# Patient Record
Sex: FEMALE | Race: BLACK OR AFRICAN AMERICAN | NOT HISPANIC OR LATINO | Employment: UNEMPLOYED | URBAN - METROPOLITAN AREA
[De-identification: names, ages, dates, MRNs, and addresses within clinical notes are randomized per-mention and may not be internally consistent; named-entity substitution may affect disease eponyms.]

---

## 2017-02-17 ENCOUNTER — CONVERSION ENCOUNTER (OUTPATIENT)
Dept: OTHER | Facility: HOSPITAL | Age: 57
End: 2017-02-17

## 2018-01-02 ENCOUNTER — OFFICE VISIT CONVERTED (OUTPATIENT)
Dept: FAMILY MEDICINE CLINIC | Age: 58
End: 2018-01-02
Attending: NURSE PRACTITIONER

## 2018-03-29 ENCOUNTER — OFFICE VISIT CONVERTED (OUTPATIENT)
Dept: FAMILY MEDICINE CLINIC | Age: 58
End: 2018-03-29
Attending: NURSE PRACTITIONER

## 2018-05-10 ENCOUNTER — OFFICE VISIT CONVERTED (OUTPATIENT)
Dept: FAMILY MEDICINE CLINIC | Age: 58
End: 2018-05-10
Attending: NURSE PRACTITIONER

## 2018-05-29 ENCOUNTER — OFFICE VISIT CONVERTED (OUTPATIENT)
Dept: CARDIOLOGY | Facility: CLINIC | Age: 58
End: 2018-05-29
Attending: INTERNAL MEDICINE

## 2018-05-29 ENCOUNTER — CONVERSION ENCOUNTER (OUTPATIENT)
Dept: OTHER | Facility: HOSPITAL | Age: 58
End: 2018-05-29

## 2018-06-04 ENCOUNTER — CONVERSION ENCOUNTER (OUTPATIENT)
Dept: CARDIOLOGY | Facility: CLINIC | Age: 58
End: 2018-06-04
Attending: INTERNAL MEDICINE

## 2018-08-21 ENCOUNTER — OFFICE VISIT CONVERTED (OUTPATIENT)
Dept: CARDIOLOGY | Facility: CLINIC | Age: 58
End: 2018-08-21
Attending: INTERNAL MEDICINE

## 2018-11-09 ENCOUNTER — OFFICE VISIT CONVERTED (OUTPATIENT)
Dept: FAMILY MEDICINE CLINIC | Age: 58
End: 2018-11-09
Attending: FAMILY MEDICINE

## 2018-11-15 ENCOUNTER — CONVERSION ENCOUNTER (OUTPATIENT)
Dept: OTHER | Facility: HOSPITAL | Age: 58
End: 2018-11-15

## 2019-03-01 ENCOUNTER — OFFICE VISIT CONVERTED (OUTPATIENT)
Dept: CARDIOLOGY | Facility: CLINIC | Age: 59
End: 2019-03-01
Attending: INTERNAL MEDICINE

## 2019-03-01 ENCOUNTER — CONVERSION ENCOUNTER (OUTPATIENT)
Dept: CARDIOLOGY | Facility: CLINIC | Age: 59
End: 2019-03-01

## 2019-07-31 ENCOUNTER — OFFICE VISIT CONVERTED (OUTPATIENT)
Dept: CARDIOLOGY | Facility: CLINIC | Age: 59
End: 2019-07-31
Attending: INTERNAL MEDICINE

## 2019-08-16 ENCOUNTER — HOSPITAL ENCOUNTER (OUTPATIENT)
Dept: OTHER | Facility: HOSPITAL | Age: 59
Discharge: HOME | End: 2019-08-16
Attending: FAMILY MEDICINE

## 2019-08-16 ENCOUNTER — OFFICE VISIT CONVERTED (OUTPATIENT)
Dept: FAMILY MEDICINE CLINIC | Age: 59
End: 2019-08-16
Attending: FAMILY MEDICINE

## 2019-08-16 LAB
APPEARANCE UR: CLEAR
BACTERIA UR QL AUTO: ABNORMAL
BILIRUB UR QL: NEGATIVE
CASTS URNS QL MICRO: ABNORMAL /[LPF]
COLOR UR: YELLOW
CONV LEUKOCYTE ESTERASE: NEGATIVE
CONV UROBILINOGEN IN URINE BY AUTOMATED TEST STRIP: 0.2 {EHRLICHU}/DL (ref 0.1–1)
EPI CELLS #/AREA URNS HPF: ABNORMAL /[HPF]
GLUCOSE 24H UR-MCNC: NEGATIVE MG/DL
HGB UR QL STRIP: NEGATIVE
KETONES UR QL STRIP: NEGATIVE MG/DL
MUCOUS THREADS URNS QL MICRO: ABNORMAL
NITRITE UR-MCNC: NEGATIVE MG/ML
PH UR STRIP.AUTO: 5.5 [PH] (ref 5–8)
PROT UR-MCNC: NEGATIVE MG/DL
RBC # BLD AUTO: ABNORMAL /[HPF]
SP GR UR STRIP: >=1.03 (ref 1–1.03)
SPECIMEN SOURCE: ABNORMAL
UNIDENT CRYS URNS QL MICRO: ABNORMAL /[HPF]
WBC #/AREA URNS HPF: ABNORMAL /[HPF]

## 2019-08-22 LAB — CONV THC CONFIRMATION (URINE) BY GC/MS: 142 NG/ML

## 2019-08-23 ENCOUNTER — HOSPITAL ENCOUNTER (OUTPATIENT)
Dept: OTHER | Facility: HOSPITAL | Age: 59
Discharge: HOME | End: 2019-08-23
Attending: FAMILY MEDICINE

## 2019-08-28 ENCOUNTER — OFFICE VISIT CONVERTED (OUTPATIENT)
Dept: NEUROSURGERY | Facility: CLINIC | Age: 59
End: 2019-08-28
Attending: NEUROLOGICAL SURGERY

## 2019-09-03 ENCOUNTER — OFFICE VISIT CONVERTED (OUTPATIENT)
Dept: CARDIOLOGY | Facility: CLINIC | Age: 59
End: 2019-09-03
Attending: INTERNAL MEDICINE

## 2019-10-22 ENCOUNTER — HOSPITAL ENCOUNTER (OUTPATIENT)
Dept: OTHER | Facility: HOSPITAL | Age: 59
Discharge: HOME | End: 2019-10-22
Attending: FAMILY MEDICINE

## 2019-10-22 ENCOUNTER — OFFICE VISIT CONVERTED (OUTPATIENT)
Dept: FAMILY MEDICINE CLINIC | Age: 59
End: 2019-10-22
Attending: FAMILY MEDICINE

## 2019-12-31 ENCOUNTER — HOSPITAL ENCOUNTER (OUTPATIENT)
Dept: OTHER | Facility: HOSPITAL | Age: 59
Discharge: HOME | End: 2019-12-31
Attending: FAMILY MEDICINE

## 2020-03-11 ENCOUNTER — OFFICE VISIT CONVERTED (OUTPATIENT)
Dept: CARDIOLOGY | Facility: CLINIC | Age: 60
End: 2020-03-11
Attending: INTERNAL MEDICINE

## 2020-03-23 ENCOUNTER — OFFICE VISIT CONVERTED (OUTPATIENT)
Dept: FAMILY MEDICINE CLINIC | Age: 60
End: 2020-03-23
Attending: FAMILY MEDICINE

## 2020-03-24 ENCOUNTER — HOSPITAL ENCOUNTER (OUTPATIENT)
Dept: OTHER | Facility: HOSPITAL | Age: 60
Discharge: HOME | End: 2020-03-24
Attending: FAMILY MEDICINE

## 2020-03-24 LAB
ALBUMIN SERPL-MCNC: 4.4 G/DL (ref 3.5–5)
ALBUMIN/GLOB SERPL: 1.6 {RATIO} (ref 1.4–2.6)
ALP SERPL-CCNC: 65 U/L (ref 53–141)
ALT SERPL-CCNC: 18 U/L (ref 10–40)
ANION GAP SERPL CALC-SCNC: 19 MMOL/L (ref 8–19)
APTT BLD: 25.7 S (ref 22.2–34.2)
AST SERPL-CCNC: 26 U/L (ref 15–50)
BILIRUB SERPL-MCNC: 0.45 MG/DL (ref 0.2–1.3)
BUN SERPL-MCNC: 16 MG/DL (ref 5–25)
BUN/CREAT SERPL: 18 {RATIO} (ref 6–20)
CALCIUM SERPL-MCNC: 9.5 MG/DL (ref 8.7–10.4)
CHLORIDE SERPL-SCNC: 100 MMOL/L (ref 99–111)
CHOLEST SERPL-MCNC: 167 MG/DL (ref 107–200)
CHOLEST/HDLC SERPL: 3 {RATIO} (ref 3–6)
CK SERPL-CCNC: 182 U/L (ref 35–230)
CONV CO2: 22 MMOL/L (ref 22–32)
CONV TOTAL PROTEIN: 7.1 G/DL (ref 6.3–8.2)
CREAT UR-MCNC: 0.88 MG/DL (ref 0.5–0.9)
ERYTHROCYTE [DISTWIDTH] IN BLOOD BY AUTOMATED COUNT: 13.3 % (ref 11.5–14.5)
GFR SERPLBLD BASED ON 1.73 SQ M-ARVRAT: >60 ML/MIN/{1.73_M2}
GLOBULIN UR ELPH-MCNC: 2.7 G/DL (ref 2–3.5)
GLUCOSE SERPL-MCNC: 114 MG/DL (ref 65–99)
HBA1C MFR BLD: 12 G/DL (ref 12–16)
HCT VFR BLD AUTO: 36.7 % (ref 37–47)
HDLC SERPL-MCNC: 55 MG/DL (ref 40–60)
INR PPP: 0.94 (ref 2–3)
LDLC SERPL CALC-MCNC: 94 MG/DL (ref 70–100)
MCH RBC QN AUTO: 28 PG (ref 27–31)
MCHC RBC AUTO-ENTMCNC: 32.7 G/DL (ref 33–37)
MCV RBC AUTO: 85.5 FL (ref 81–99)
OSMOLALITY SERPL CALC.SUM OF ELEC: 286 MOSM/KG (ref 273–304)
PLATELET # BLD AUTO: 310 10*3/UL (ref 130–400)
PMV BLD AUTO: 9.6 FL (ref 7.4–10.4)
POTASSIUM SERPL-SCNC: 3.9 MMOL/L (ref 3.5–5.3)
PROTHROMBIN TIME: 10.3 S (ref 9.4–12)
RBC # BLD AUTO: 4.29 10*6/UL (ref 4.2–5.4)
SODIUM SERPL-SCNC: 137 MMOL/L (ref 135–147)
TRIGL SERPL-MCNC: 89 MG/DL (ref 40–150)
TSH SERPL-ACNC: 3.18 M[IU]/L (ref 0.27–4.2)
VLDLC SERPL-MCNC: 18 MG/DL (ref 5–37)
WBC # BLD AUTO: 4.62 10*3/UL (ref 4.8–10.8)

## 2020-08-05 ENCOUNTER — OFFICE VISIT CONVERTED (OUTPATIENT)
Dept: FAMILY MEDICINE CLINIC | Age: 60
End: 2020-08-05
Attending: NURSE PRACTITIONER

## 2020-08-07 ENCOUNTER — HOSPITAL ENCOUNTER (OUTPATIENT)
Dept: OTHER | Facility: HOSPITAL | Age: 60
Discharge: HOME | End: 2020-08-07
Attending: NURSE PRACTITIONER

## 2020-10-28 ENCOUNTER — OFFICE VISIT CONVERTED (OUTPATIENT)
Dept: NEUROSURGERY | Facility: CLINIC | Age: 60
End: 2020-10-28
Attending: PHYSICIAN ASSISTANT

## 2020-11-24 ENCOUNTER — CONVERSION ENCOUNTER (OUTPATIENT)
Dept: CARDIOLOGY | Facility: CLINIC | Age: 60
End: 2020-11-24

## 2020-11-24 ENCOUNTER — OFFICE VISIT CONVERTED (OUTPATIENT)
Dept: CARDIOLOGY | Facility: CLINIC | Age: 60
End: 2020-11-24
Attending: INTERNAL MEDICINE

## 2020-12-22 ENCOUNTER — OFFICE VISIT CONVERTED (OUTPATIENT)
Dept: FAMILY MEDICINE CLINIC | Age: 60
End: 2020-12-22
Attending: FAMILY MEDICINE

## 2020-12-28 ENCOUNTER — HOSPITAL ENCOUNTER (OUTPATIENT)
Dept: OTHER | Facility: HOSPITAL | Age: 60
Discharge: HOME | End: 2020-12-28
Attending: FAMILY MEDICINE

## 2020-12-28 LAB
ALBUMIN SERPL-MCNC: 4.4 G/DL (ref 3.5–5)
ALBUMIN/GLOB SERPL: 1.7 {RATIO} (ref 1.4–2.6)
ALP SERPL-CCNC: 80 U/L (ref 53–141)
ALT SERPL-CCNC: 20 U/L (ref 10–40)
ANION GAP SERPL CALC-SCNC: 12 MMOL/L (ref 8–19)
AST SERPL-CCNC: 20 U/L (ref 15–50)
BILIRUB SERPL-MCNC: 0.2 MG/DL (ref 0.2–1.3)
BUN SERPL-MCNC: 17 MG/DL (ref 5–25)
BUN/CREAT SERPL: 21 {RATIO} (ref 6–20)
CALCIUM SERPL-MCNC: 9.7 MG/DL (ref 8.7–10.4)
CHLORIDE SERPL-SCNC: 108 MMOL/L (ref 99–111)
CK SERPL-CCNC: 94 U/L (ref 35–230)
CONV CO2: 26 MMOL/L (ref 22–32)
CONV TOTAL PROTEIN: 7 G/DL (ref 6.3–8.2)
CREAT UR-MCNC: 0.8 MG/DL (ref 0.5–0.9)
GFR SERPLBLD BASED ON 1.73 SQ M-ARVRAT: >60 ML/MIN/{1.73_M2}
GLOBULIN UR ELPH-MCNC: 2.6 G/DL (ref 2–3.5)
GLUCOSE SERPL-MCNC: 98 MG/DL (ref 65–99)
OSMOLALITY SERPL CALC.SUM OF ELEC: 296 MOSM/KG (ref 273–304)
POTASSIUM SERPL-SCNC: 4.2 MMOL/L (ref 3.5–5.3)
SODIUM SERPL-SCNC: 142 MMOL/L (ref 135–147)

## 2021-02-08 ENCOUNTER — HOSPITAL ENCOUNTER (OUTPATIENT)
Dept: OTHER | Facility: HOSPITAL | Age: 61
Discharge: HOME | End: 2021-02-08
Attending: NURSE PRACTITIONER

## 2021-02-10 ENCOUNTER — HOSPITAL ENCOUNTER (OUTPATIENT)
Dept: OTHER | Facility: HOSPITAL | Age: 61
Discharge: HOME | End: 2021-02-10
Attending: FAMILY MEDICINE

## 2021-02-10 LAB
APPEARANCE UR: CLEAR
BACTERIA UR CULT: NORMAL
BACTERIA UR QL AUTO: ABNORMAL
BILIRUB UR QL: NEGATIVE
CASTS URNS QL MICRO: ABNORMAL /[LPF]
COLOR UR: YELLOW
CONV LEUKOCYTE ESTERASE: ABNORMAL
CONV UROBILINOGEN IN URINE BY AUTOMATED TEST STRIP: 0.2 {EHRLICHU}/DL (ref 0.1–1)
EPI CELLS #/AREA URNS HPF: ABNORMAL /[HPF]
GLUCOSE 24H UR-MCNC: NEGATIVE MG/DL
HGB UR QL STRIP: NEGATIVE
KETONES UR QL STRIP: NEGATIVE MG/DL
MUCOUS THREADS URNS QL MICRO: ABNORMAL
NITRITE UR-MCNC: NEGATIVE MG/ML
PH UR STRIP.AUTO: 7 [PH] (ref 5–8)
PROT UR-MCNC: NEGATIVE MG/DL
RBC # BLD AUTO: ABNORMAL /[HPF]
SP GR UR STRIP: 1.01 (ref 1–1.03)
SPECIMEN SOURCE: ABNORMAL
UNIDENT CRYS URNS QL MICRO: ABNORMAL /[HPF]
WBC #/AREA URNS HPF: ABNORMAL /[HPF]

## 2021-02-12 LAB — BACTERIA UR CULT: NORMAL

## 2021-03-12 ENCOUNTER — HOSPITAL ENCOUNTER (OUTPATIENT)
Dept: VACCINE CLINIC | Facility: HOSPITAL | Age: 61
Discharge: HOME | End: 2021-03-12
Attending: INTERNAL MEDICINE

## 2021-03-22 ENCOUNTER — HOSPITAL ENCOUNTER (OUTPATIENT)
Dept: OTHER | Facility: HOSPITAL | Age: 61
Discharge: HOME | End: 2021-03-22
Attending: FAMILY MEDICINE

## 2021-03-22 ENCOUNTER — OFFICE VISIT CONVERTED (OUTPATIENT)
Dept: FAMILY MEDICINE CLINIC | Age: 61
End: 2021-03-22
Attending: FAMILY MEDICINE

## 2021-03-22 LAB
APPEARANCE UR: CLEAR
BACTERIA UR QL AUTO: ABNORMAL
BILIRUB UR QL: NEGATIVE
CASTS URNS QL MICRO: ABNORMAL /[LPF]
CK SERPL-CCNC: 144 U/L (ref 35–230)
COLOR UR: YELLOW
CONV LEUKOCYTE ESTERASE: NEGATIVE
CONV UROBILINOGEN IN URINE BY AUTOMATED TEST STRIP: 0.2 {EHRLICHU}/DL (ref 0.1–1)
EPI CELLS #/AREA URNS HPF: ABNORMAL /[HPF]
ERYTHROCYTE [DISTWIDTH] IN BLOOD BY AUTOMATED COUNT: 12.9 % (ref 11.5–14.5)
GLUCOSE 24H UR-MCNC: NEGATIVE MG/DL
HBA1C MFR BLD: 13.4 G/DL (ref 12–16)
HCT VFR BLD AUTO: 40.5 % (ref 37–47)
HGB UR QL STRIP: ABNORMAL
KETONES UR QL STRIP: 15 MG/DL
MCH RBC QN AUTO: 28.6 PG (ref 27–31)
MCHC RBC AUTO-ENTMCNC: 33.1 G/DL (ref 33–37)
MCV RBC AUTO: 86.4 FL (ref 81–99)
MUCOUS THREADS URNS QL MICRO: ABNORMAL
NITRITE UR-MCNC: NEGATIVE MG/ML
PH UR STRIP.AUTO: 5.5 [PH] (ref 5–8)
PLATELET # BLD AUTO: 336 10*3/UL (ref 130–400)
PMV BLD AUTO: 10.2 FL (ref 7.4–10.4)
PROT UR-MCNC: NEGATIVE MG/DL
RBC # BLD AUTO: 4.69 10*6/UL (ref 4.2–5.4)
RBC # BLD AUTO: ABNORMAL /[HPF]
SP GR UR STRIP: >=1.03 (ref 1–1.03)
SPECIMEN SOURCE: ABNORMAL
TSH SERPL-ACNC: 2.07 M[IU]/L (ref 0.27–4.2)
UNIDENT CRYS URNS QL MICRO: ABNORMAL /[HPF]
WBC # BLD AUTO: 6.05 10*3/UL (ref 4.8–10.8)
WBC #/AREA URNS HPF: ABNORMAL /[HPF]

## 2021-03-23 LAB
ALBUMIN SERPL-MCNC: 4.9 G/DL (ref 3.5–5)
ALBUMIN/GLOB SERPL: 1.8 {RATIO} (ref 1.4–2.6)
ALP SERPL-CCNC: 65 U/L (ref 53–141)
ALT SERPL-CCNC: 20 U/L (ref 10–40)
ANION GAP SERPL CALC-SCNC: 21 MMOL/L (ref 8–19)
AST SERPL-CCNC: 23 U/L (ref 15–50)
BILIRUB SERPL-MCNC: 0.46 MG/DL (ref 0.2–1.3)
BUN SERPL-MCNC: 21 MG/DL (ref 5–25)
BUN/CREAT SERPL: 23 {RATIO} (ref 6–20)
CALCIUM SERPL-MCNC: 9.6 MG/DL (ref 8.7–10.4)
CHLORIDE SERPL-SCNC: 97 MMOL/L (ref 99–111)
CHOLEST SERPL-MCNC: 175 MG/DL (ref 107–200)
CHOLEST/HDLC SERPL: 3.2 {RATIO} (ref 3–6)
CONV CO2: 23 MMOL/L (ref 22–32)
CONV TOTAL PROTEIN: 7.7 G/DL (ref 6.3–8.2)
CREAT UR-MCNC: 0.91 MG/DL (ref 0.5–0.9)
GFR SERPLBLD BASED ON 1.73 SQ M-ARVRAT: >60 ML/MIN/{1.73_M2}
GLOBULIN UR ELPH-MCNC: 2.8 G/DL (ref 2–3.5)
GLUCOSE SERPL-MCNC: 91 MG/DL (ref 65–99)
HDLC SERPL-MCNC: 54 MG/DL (ref 40–60)
LDLC SERPL CALC-MCNC: 105 MG/DL (ref 70–100)
OSMOLALITY SERPL CALC.SUM OF ELEC: 287 MOSM/KG (ref 273–304)
POTASSIUM SERPL-SCNC: 3.6 MMOL/L (ref 3.5–5.3)
SODIUM SERPL-SCNC: 137 MMOL/L (ref 135–147)
TRIGL SERPL-MCNC: 82 MG/DL (ref 40–150)
VLDLC SERPL-MCNC: 16 MG/DL (ref 5–37)

## 2021-03-24 LAB — BACTERIA UR CULT: NORMAL

## 2021-04-09 ENCOUNTER — HOSPITAL ENCOUNTER (OUTPATIENT)
Dept: VACCINE CLINIC | Facility: HOSPITAL | Age: 61
Discharge: HOME | End: 2021-04-09
Attending: INTERNAL MEDICINE

## 2021-05-13 NOTE — PROGRESS NOTES
Progress Note      Patient Name: Chely Calderón   Patient ID: [id number]   Sex: Female   YOB: 1960    Primary Care Provider: Manoj Ybarra MD   Referring Provider: Manoj Ybarra MD    Visit Date: 2020    Provider: Jesse Wilson MD   Location: [facility]   Location Address: [address] [place] [address] Adriel Gold [facility]  [address]  [place], [place]  [id number]   Location Phone: [phone]          Chief Complaint  · Follow-up visit for chest pain and hypertension       History Of Present Illness  REFERRING CARE PROVIDER: Manoj Ybarra MD   Chely Calderón is a 60-year-old female with hypertension, hyperlipidemia, fibromyalgia, who is here for a follow-up visit. Last seen in the office on May 21, 2020. The patient is very stressed out today and appears to be depressed. She is telling me that her son  in the meantime. She is stressed out most of the time. Blood pressure has been running better after adding Amlodipine in September. Occasionally she gets chest pain but mild and not related to activities. She also has palpitations. No shortness of breath. No dizziness. Patient was very tearful in the office during my interview.   PAST MEDICAL HISTORY: (1) Fibromyalgia. (2) Hypertension. (3) Hyperlipidemia. (4) Negative for coronary artery disease or diabetes mellitus.   PSYCHOSOCIAL HISTORY: She drinks a moderate amount of alcohol. She previously used tobacco but quit.   CURRENT MEDICATIONS: include Atorvastatin 20 mg daily; Carvedilol 6.25 mg b.i.d.; HCTZ 12.5 mg daily; Losartan 100 mg daily; Omeprazole 40 mg daily; Trazodone 50 mg p.r.n.; ASA 81 mg daily; probiotic; multivitamin; Amlodipine 5 mg daily. The dosage and frequency of the medications were reviewed with the patient.      ALLERGIES: Lisinopril.       Review of Systems  · Cardiovascular  o Admits  o : palpitations (fast, fluttering, or skipping beats), chest pain or angina pectoris   o Denies  o : swelling (feet, ankles,  "hands), shortness of breath while walking or lying flat  · Respiratory  o Denies  o : chronic or frequent cough, asthma or wheezing      Vitals  Date Time BP Position Site L\R Cuff Size HR RR TEMP (F) WT  HT  BMI kg/m2 BSA m2 O2 Sat FR L/min FiO2 HC       11/24/2020 04:05 /72 Sitting    72 - R   192lbs 5oz 5'  4\" 33.01 1.98             Physical Examination  · Respiratory  o Auscultation of Lungs  o : Clear to auscultation bilaterally. No crackles or rhonchi.  · Cardiovascular  o Heart  o : S1, S2 is normally heard. No S3. No murmur, rubs, or gallops.  · Gastrointestinal  o Abdominal Examination  o : Soft, nontender, nondistended. No free fluid. Bowel sounds heard in all four quadrants.  · Extremities  o Extremities  o : Warm and well perfused. No pitting pedal edema. Distal pulses present.     No recent labs available for review.           Assessment     ASSESSMENT AND PLAN:    1.  Hypertension:  Blood pressure reasonably well controlled per office readings and also per readings at home.      Continue the current regimen, including Amlodipine, Losartan and Carvedilol.  2.  Hyperlipidemia:  Will continue Atorvastatin.  Will get the recent labs from the PCP's office.  3.  Chest pain:  Atypical symptoms.  Previous two stress tests were negative.  No further workup is planned at       this point.  4.  Follow up in one year or earlier if needed.    MD IFTIKHAR Blanco/kerry           This note was transcribed by Marisol Gonzalez.  kerry/IFTIKHAR  The above service was transcribed by Marisol Gonzalez, and I attest to the accuracy of the note.  IFTIKHAR               Electronically Signed by: Marisol Gonzalez-, -Author on December 1, 2020 06:47:10 AM  Electronically Co-signed by: Jesse Wilson MD -Reviewer on December 1, 2020 05:21:56 PM  "

## 2021-05-13 NOTE — PROGRESS NOTES
Progress Note      Patient Name: Chely Calderón   Patient ID: [id number]   Sex: Female   YOB: 1960    Primary Care Provider: Manoj Ybarra MD   Referring Provider: Manoj Ybarra MD    Visit Date: October 28, 2020    Provider: Ruth Messer PA-C   Location: [facility]   Location Address: [address] [place] [address]  [id number]   Location Phone: [phone]          Chief Complaint     Patient is being seen today for low back and bilateral leg pain. Patient also states she's having numbness/tingling in LLE and RLE.       History Of Present Illness  The patient is a 60 year old /Black female who is in the office for followup appointment.      Here for f/u for lbp and bilateral leg pain, right greater than left.  Pain worse with standing and walking and improves with sitting.  Failed PT, which is what Dr. Swartz recommended.  MRI lumbar spine was in August 2019 and showed a central disc herniation at L5/S1 causing moderate central canal stenosis.  Pain in the right leg is down to the foot. She would like to consider LESB.       Past Medical History  Hyperlipidemia; Hypertension, Benign Essential; Leg pain; Muscle cramps         Past Surgical History  Cholecystecomy         Medication List  amlodipine 5 mg oral tablet; Aspir-81 81 mg oral tablet,delayed release (DR/EC); atorvastatin 20 mg oral tablet; carvedilol 3.125 mg oral tablet; carvedilol 6.25 mg oral tablet; gabapentin 100 mg oral capsule; hydrochlorothiazide 12.5 mg oral capsule; losartan 100 mg oral tablet; multivitamin oral capsule; omeprazole 40 mg oral capsule,delayed release(DR/EC); trazodone 50 mg oral tablet         Allergy List  lisinopril       Allergies Reconciled  Family Medical History  Family history of Arthritis; Family history of heart disease; Family history of diabetes mellitus         Social History  Alcohol (Current some day); ; lives alone; Tobacco  "(Former); Working         Review of Systems  · Constitutional  o Denies  o : chills, excessive sweating, fatigue, fever, sycope/passing out, weight gain, weight loss  · Eyes  o Denies  o : changes in vision, blurry vision, double vision  · HENT  o Denies  o : loss of hearing, ringing in the ears, ear aches, sore throat, nasal congestion, sinus pain, nose bleeds, seasonal allergies  · Cardiovascular  o Denies  o : blood clots, swollen legs, anemia, easy burising or bleeding, transfusions  · Respiratory  o Denies  o : shortness of breath, dry cough, productive cough, pneumonia, COPD  · Gastrointestinal  o Denies  o : difficulty swallowing, reflux  · Genitourinary  o Denies  o : incontinence  · Neurologic  o Admits  o : numbness/tingling/paresthesia   o Denies  o : headache, seizure, stroke, tremor, loss of balance, falls, dizziness/vertigo, difficulty with sleep, difficulty with coordination, difficulty with dexterity, weakness  · Musculoskeletal  o Admits  o : sciatica, pain radiating in leg, low back pain  o Denies  o : neck stiffness/pain, swollen lymph nodes, muscle aches, joint pain, weakness, spasms  · Endocrine  o Denies  o : diabetes, thyroid disorder  · Psychiatric  o Denies  o : anxiety, depression  · All Others Negative      Vitals  Date Time BP Position Site L\R Cuff Size HR RR TEMP (F) WT  HT  BMI kg/m2 BSA m2 O2 Sat FR L/min FiO2        10/28/2020 09:41 AM        96.4 194lbs 3oz 5'  4\" 33.33 1.99             Physical Examination  · Constitutional  o Appearance  o : well-nourished, well developed, alert, in no acute distress  · Respiratory  o Respiratory Effort  o : breathing unlabored  · Cardiovascular  o Peripheral Vascular System  o :   § Extremities  § : no cyanosis, clubbing or edema  · Neurologic  o Mental Status Examination  o :   § Orientation  § : grossly oriented to person, place and time  o Motor Examination  o :   § RLE Strength  § : strength normal  § RLE Motor Function  § : tone normal, no " "atrophy, no abnormal movements noted  § LLE Strength  § : strength normal  § LLE Motor Function  § : tone normal, no atrophy, no abnormal movements noted  o Reflexes  o :   § RLE  § : 1/4 knee and ankle reflex  § LLE  § : 1/4 knee and ankle reflex  o Sensation  o :   § Light Touch  § : sensation intact to light touch in extremities  o Gait and Station  o :   § Gait Screening  § : gait within normal limits  · Psychiatric  o Mood and Affect  o : mood normal, affect appropriate          Assessment  · Degeneration of lumbosacral intervertebral disc     722.52/M51.37  · Displacement of lumbar intervertebral disc     722.10/M51.26  · Lumbago/low back pain     724.3/M54.40  · Lumbosacral disc herniation, L5-S1     722.10/M51.27  central without neural compression      Plan  · Orders  o PAIN MANAGEMENT CONSULTATION (PAINM) - 722.10/M51.26, 722.52/M51.37, 722.10/M51.27, 724.3/M54.40 - 10/28/2020   refer for consult for bilateral L5/S1 TFESI  · Medications  o Medications have been Reconciled  o Transition of Care or Provider Policy  · Instructions  o The ROS and the PFSH were reviewed at today's visit.  o Call or return to office if symptoms worsen or persist.   o I will refer her to pain management to discuss LESB. If not improving f/u here to discuss ordering a repeat MRI lumbar spine.   · Associate Tasks  o Task ID [id number] \"''Provider to Nurse: refer to CPA for bilateral TFESI at L5/S1            Electronically Signed by: Ruth Messer PA-C -Author on October 28, 2020 10:06:50 AM  "

## 2021-05-14 VITALS — BODY MASS INDEX: 33.15 KG/M2 | WEIGHT: 194.19 LBS | TEMPERATURE: 96.4 F | HEIGHT: 64 IN

## 2021-05-14 VITALS
HEIGHT: 64 IN | WEIGHT: 192.31 LBS | HEART RATE: 72 BPM | SYSTOLIC BLOOD PRESSURE: 140 MMHG | BODY MASS INDEX: 32.83 KG/M2 | DIASTOLIC BLOOD PRESSURE: 72 MMHG

## 2021-05-15 VITALS
DIASTOLIC BLOOD PRESSURE: 76 MMHG | WEIGHT: 197.12 LBS | HEIGHT: 64 IN | HEART RATE: 73 BPM | BODY MASS INDEX: 33.65 KG/M2 | SYSTOLIC BLOOD PRESSURE: 144 MMHG

## 2021-05-15 VITALS
SYSTOLIC BLOOD PRESSURE: 137 MMHG | BODY MASS INDEX: 33.87 KG/M2 | WEIGHT: 198.37 LBS | DIASTOLIC BLOOD PRESSURE: 98 MMHG | HEIGHT: 64 IN

## 2021-05-15 VITALS
WEIGHT: 202 LBS | HEART RATE: 75 BPM | DIASTOLIC BLOOD PRESSURE: 90 MMHG | SYSTOLIC BLOOD PRESSURE: 156 MMHG | BODY MASS INDEX: 34.49 KG/M2 | HEIGHT: 64 IN

## 2021-05-15 VITALS
BODY MASS INDEX: 34.66 KG/M2 | HEIGHT: 64 IN | SYSTOLIC BLOOD PRESSURE: 138 MMHG | WEIGHT: 203 LBS | DIASTOLIC BLOOD PRESSURE: 82 MMHG | HEART RATE: 62 BPM

## 2021-05-16 VITALS
DIASTOLIC BLOOD PRESSURE: 131 MMHG | SYSTOLIC BLOOD PRESSURE: 187 MMHG | HEART RATE: 73 BPM | HEIGHT: 64 IN | WEIGHT: 204 LBS | BODY MASS INDEX: 34.83 KG/M2

## 2021-05-16 VITALS
WEIGHT: 199 LBS | DIASTOLIC BLOOD PRESSURE: 76 MMHG | BODY MASS INDEX: 33.97 KG/M2 | HEIGHT: 64 IN | SYSTOLIC BLOOD PRESSURE: 151 MMHG | HEART RATE: 55 BPM

## 2021-05-16 VITALS
HEART RATE: 74 BPM | SYSTOLIC BLOOD PRESSURE: 146 MMHG | WEIGHT: 204 LBS | DIASTOLIC BLOOD PRESSURE: 87 MMHG | HEIGHT: 64 IN | BODY MASS INDEX: 34.83 KG/M2

## 2021-05-18 NOTE — PROGRESS NOTES
Chely Calderón 1960     Office/Outpatient Visit    Visit Date: Fri, Nov 9, 2018 10:17 am    Provider: Manoj Ybarra MD (Assistant: Cecilia Richmond RN)    Location: [facility]        Electronically signed by Manoj Ybarra MD on  11/09/2018 04:59:43 PM                             SUBJECTIVE:        CC: blood pressure, cholesterol         HPI:         Ms. Calderón presents with hTN.  Her current cardiac medication regimen includes a diuretic ( Hydrodiuril ), a beta-blocker ( Toprol-XL ), and an angiotensin receptor blocker ( Cozaar ).  Review of her blood pressure log reveals systolics in the 150s.  She is tolerating the medication well without side effects.  Compliance with treatment has been fair; she skips some medication doses due to side effects.  Chely has been out of some of her medications.  It is not clear that her blood pressure is uncontrolled.         Hypercholesterolemia details; current treatment includes Lipitor.  Compliance with treatment has been good; she takes her medication as directed and follows up as directed.          Chely also has a history of reflux for which she has been on omeprazole.  She says that she does not function well without the omeprazole.  She is having quite a bit of reflux after being without it for a week or so.         Chely has been struggling to some degree recently with depression and anxiety.  There are a lot of stressors present.  She does drink some during the week.  She denies any intention to harm herself.     ROS:     CONSTITUTIONAL:  Negative for chills and fever.      CARDIOVASCULAR:  Negative for chest pain and palpitations.      RESPIRATORY:  Negative for recent cough and dyspnea.      GASTROINTESTINAL:  Negative for abdominal pain, nausea and vomiting.      INTEGUMENTARY/BREAST:  Negative for atypical mole(s) and rash.          PMH/FMH/SH:     Last Reviewed on 11/09/2018 10:51 AM by Manoj Ybarra    Past Medical History:                  PAST MEDICAL HISTORY         Hyperlipidemia: Hypercholesterolemia;     Hypertension     Fibromyalgia         CURRENT MEDICAL PROVIDERS:    Cardiologist: DILSHAD in past, not haider    Ophthalmologist: Manish         PREVENTIVE HEALTH MAINTENANCE             BONE DENSITY: was last done 2/2017 with normal results     COLORECTAL CANCER SCREENING: Up to date (colonoscopy q10y; sigmoidoscopy q5y; Cologuard q3y) was last done 03/2017, Results are in chart; colonoscopy with the following abnormalities noted-- diverticulosis     EYE EXAM: was last done 5/16/17     MAMMOGRAM: was last done 2/2017 with normal results     PAP SMEAR: was last done 4/2017 with normal results         Surgical History:         Cholecystectomy    Tubal Ligation   Uterine Ablation         Family History:         Positive for Hyperlipidemia ( mother ).      Positive for Breast Cancer ( sister ).      Positive for Type 2 Diabetes ( mother ).          Social History:     Occupation: Disabled (due to fibromyalgia)     Marital Status:      Children: 2 children         Tobacco/Alcohol/Supplements:     Last Reviewed on 11/09/2018 10:51 AM by Manoj Ybarra    Tobacco: She has a past history of cigarette smoking; quit date:  Quit Date 2013.          Alcohol: Frequency:    '4-5 a week';         Substance Abuse History:     Last Reviewed on 11/09/2018 10:51 AM by Manoj Ybarra    NEGATIVE         Mental Health History:     Last Reviewed on 11/09/2018 10:51 AM by Manoj Ybarra        Communicable Diseases (eg STDs):     Last Reviewed on 11/09/2018 10:51 AM by Manoj Ybarra            Current Problems:     Last Reviewed on 11/09/2018 10:51 AM by Manoj Ybarra    Fever, unspecified     Low back pain     Fatigue     Systolic murmur     Episodic cluster headache     Chronic low back pain     HTN     Hypercholesterolemia     Fibromyalgia     Anxiety     Hearing loss     PMDD     Benign skin neoplasm, of  scalp and neck     GERD     Fibrocystic disease of breasts     Polycystic ovarian disease     Benign essential tremor         Immunizations:     zzFluzone pf-quadrivalent 3 and up 10/5/2015     Fluzone (3 + years dose) 11/6/2008     Fluzone (3 + years dose) 11/2/2009     Fluzone (3 + years dose) 12/8/2011     Fluzone (3 + years dose) 10/16/2012     Fluzone (3 + years dose) 9/30/2016     Fluzone (3 + years dose) 9/13/2017     Fluzone pf (3+ years dose) 11/1/2013     FluMist 12/11/2006         Allergies:     Last Reviewed on 11/09/2018 10:51 AM by Manoj Ybarra      No Known Drug Allergies.     ACE Inhibitors: cough (Adverse Reaction)        Current Medications:     Last Reviewed on 11/09/2018 10:51 AM by Manoj Ybarra    Lipitor 20mg Tablet 1 tab daily     Hydrochlorothiazide (HCTZ) 12.5mg Capsules Take 1 capsule(s) by mouth daily     Metoprolol Succinate 25mg Tablets, Extended Release Take 1 tablet(s) bid     Omeprazole 40mg Capsules, Extended Release 1 capsule daily     Trazodone HCl 50mg Tablet 1 tab hs/prn     Losartan 100mg Tablet Take 1 tablet(s) by mouth daily     Naproxen Sodium 220mg Tablet 1 tab daily     Promethazine HCl 25mg Tablet 1/2 - 1 tab q 4-6 hrs prn   prn nausea and vomiting         OBJECTIVE:        Vitals:         Current: 11/9/2018 10:24:32 AM    Ht:  5 ft, 4 in;  Wt: 206.6 lbs;  BMI: 35.5    T: 97.2 F (oral);  BP: 168/79 mm Hg (right arm, sitting);  P: 68 bpm (right arm (BP Cuff), sitting);  sCr: 0.88 mg/dL;  GFR: 81.13        Exams:     PHYSICAL EXAM:     GENERAL: vital signs recorded - well developed,  moderately obese;  no apparent distress;     EYES: extraocular movements intact; conjunctiva and cornea are normal; PERRLA;     E/N/T:  normal EACs, TMs, nasal/oral mucosa, teeth, gingiva, and oropharynx;     NECK: range of motion is normal; thyroid is non-palpable;     RESPIRATORY: normal respiratory rate and pattern with no distress; normal breath sounds with no rales,  rhonchi, wheezes or rubs;     CARDIOVASCULAR: normal rate; rhythm is regular;  no systolic murmur; no edema;     GASTROINTESTINAL: nontender; normal bowel sounds; no organomegaly;     LYMPHATIC: no enlargement of cervical or facial nodes; no supraclavicular nodes;     BREAST/INTEGUMENT: SKIN: no significant rashes or lesions; no suspicious moles;     PSYCHIATRIC: affect/demeanor: anxious, tearful;  normal psychomotor function;         Procedures:     Influenza vaccination     1. Influenza, seasonal PF (children 3 years to adult): 0.5 ml unit dose given IM in the right upper arm; administered by [facility] Regarding contraindications to an Influenza vaccine: Denies moderate/severe illness with/without fever; serious reaction to eggs, egg proteins, gentamicin, gelatin, arginine, neomycin or polymixin; serious reaction after recieving previous influenza vaccines; and history of Guillain-[place] Syndrome.              ASSESSMENT           401.1   I10  HTN              DDx:     272.0   E78.2  Hypercholesterolemia              DDx:     530.81   K21.9  GERD              DDx:     300.02   F41.8  Anxiety              DDx:     V04.81   Z23  Influenza vaccination              DDx:         ORDERS:         Meds Prescribed:       Refill of: Lipitor (Atorvastatin Calcium) 20mg Tablet Take 1 tablet(s) by mouth daily  #90 (Ninety) tablet(s) Refills: 1       Refill of: Hydrochlorothiazide (HCTZ) 12.5mg Capsules Take 1 capsule(s) by mouth daily  #90 (Ninety) capsule(s) Refills: 1       Refill of: Losartan 100mg Tablet Take 1 tablet(s) by mouth daily  #90 (Ninety) tablet(s) Refills: 1       Refill of: Metoprolol Succinate 25mg Tablets, Extended Release Take 1 tablet(s) bid  #180 (One [place] and Eighty) tablet(s) Refills: 1       Refill of: Omeprazole 40mg Capsules, Extended Release Take 1 capsule(s) by mouth daily  #90 (Ninety) capsule(s) Refills: 1       Wellbutrin XL (Bupropion HCl) 150mg Tablets, Extended Release Take 1 tablet(s) by mouth  daily  #30 (Thirty) tablet(s) Refills: 3         Lab Orders:       [id number]  HTNLP - H CMP AND LIPID: [id number], [id number]  (Send-Out)         38492  TSH - H TSH  (Send-Out)         97885  MG - H Magnesium, Serum  (Send-Out)           Other Orders:       [id number]  Influenza virus vaccine, quadrivalent, split virus, preservative free 3 years of age & older  (In-House)           Administration of influenza virus vaccine (x1)                 PLAN:          HTN         RECOMMENDATIONS given include: Today, we have reviewed Chely's care.  I'm going to refill medications as noted and plan to add Wellbutrin for how she is doing emotionally.  We will consider some counseling depending on how she responds to that.  Otherwise, labs to be obtained.  We will contact her after those come back..            Prescriptions:       Refill of: Hydrochlorothiazide (HCTZ) 12.5mg Capsules Take 1 capsule(s) by mouth daily  #90 (Ninety) capsule(s) Refills: 1       Refill of: Losartan 100mg Tablet Take 1 tablet(s) by mouth daily  #90 (Ninety) tablet(s) Refills: 1       Refill of: Metoprolol Succinate 25mg Tablets, Extended Release Take 1 tablet(s) bid  #180 (One [place] and Eighty) tablet(s) Refills: 1           Patient Education Handouts:       High Blood Pressure (HTN)           Hypercholesterolemia     LABORATORY:  Labs ordered to be performed today include HTN/Lipid Panel: CMP, Lipid and TSH.            Prescriptions:       Refill of: Lipitor (Atorvastatin Calcium) 20mg Tablet Take 1 tablet(s) by mouth daily  #90 (Ninety) tablet(s) Refills: 1           Orders:       [id number]  HTNLP - H CMP AND LIPID: [id number], [id number]  (Send-Out)         61758  TSH - H TSH  (Send-Out)            GERD     LABORATORY:  Labs ordered to be performed today include Magnesium level.            Prescriptions:       Refill of: Omeprazole 40mg Capsules, Extended Release Take 1 capsule(s) by mouth daily  #90 (Ninety) capsule(s) Refills: 1           Orders:       [id number]   MG - [facility] Magnesium, Serum  (Send-Out)            Anxiety           Prescriptions:       Wellbutrin XL (Bupropion HCl) 150mg Tablets, Extended Release Take 1 tablet(s) by mouth daily  #30 (Thirty) tablet(s) Refills: 3          Influenza vaccination           Orders:       [id number]  Influenza virus vaccine, quadrivalent, split virus, preservative free 3 years of age & older  (In-House)                     Administration of influenza virus vaccine (x1)             CHARGE CAPTURE           **Please note: ICD descriptions below are intended for billing purposes only and may not represent clinical diagnoses**        Primary Diagnosis:         401.1 HTN            I10    Essential (primary) hypertension              Orders:          85327   Office/outpatient visit; established patient, level 4  (In-House)           272.0 Hypercholesterolemia            E78.2    Mixed hyperlipidemia    530.81 GERD            K21.9    Gastro-esophageal reflux disease without esophagitis    300.02 Anxiety            F41.8    Other specified anxiety disorders    V04.81 Influenza vaccination            Z23    Encounter for immunization              Orders:          [id number]   Influenza virus vaccine, quadrivalent, split virus, preservative free 3 years of age & older  (In-House)                                           Administration of influenza virus vaccine (x1)

## 2021-05-18 NOTE — PROGRESS NOTES
Chely CalderónSteffanie 1960     Office/Outpatient Visit    Visit Date: Tue, Oct 22, 2019 01:18 pm    Provider: Manoj Ybarra MD (Assistant: Cecilia Richmond RN)    Location: [facility]        Electronically signed by Manoj Ybarra MD on  10/23/2019 10:54:22 AM                             SUBJECTIVE:        CC: neck pain         HPI:     Chely is in today for follow up on neck pain.  She developed pain in the R side of the neck - about 6 days ago.  It was significant enough that she went to urgent care for evaluation.  She did not have X-ray of the neck and was given Toradol and steroid injections.  She continues to have pain in the neck.  The pain radiates to the R arm as noted.  She has some radiating pain to the area behind the R ear as well.  She is not aware of specific injury that might explain her pain.  She has no history of surgery on the cervical spine.  She does not necessarily feel weak in the R arm and hand.  Pain is the bigger issue.  She has not been to work this week.  She was in urgent care two days ago.  She is taking naproxen somewhat regularly and also takes Flexeril.  She does use marijuana as well.     ROS:     CONSTITUTIONAL:  Negative for chills and fever.      CARDIOVASCULAR:  Negative for chest pain and palpitations.      RESPIRATORY:  Negative for recent cough and dyspnea.      GASTROINTESTINAL:  Negative for abdominal pain, nausea and vomiting.      INTEGUMENTARY/BREAST:  Negative for atypical mole(s) and rash.          PMH/FMH/SH:     Last Reviewed on 8/16/2019 10:17 AM by Manoj Ybarra    Past Medical History:                 PAST MEDICAL HISTORY         Hyperlipidemia: Hypercholesterolemia;     Hypertension     Fibromyalgia         CURRENT MEDICAL PROVIDERS:    Cardiologist: Dr. Wilson    Ophthalmologist: Manish             ADVANCED DIRECTIVES: None - Her children would make decisions for her if needed.         PREVENTIVE HEALTH MAINTENANCE             BONE  DENSITY: was last done 2/2017 with normal results     COLORECTAL CANCER SCREENING: Up to date (colonoscopy q10y; sigmoidoscopy q5y; Cologuard q3y) was last done 03/2017, Results are in chart; colonoscopy with the following abnormalities noted-- diverticulosis     EYE EXAM: was last done 5/16/17     MAMMOGRAM: Done within last 2 years and results in are chart was last done 11/16/18 with normal results     PAP SMEAR: was last done 4/2017 with normal results         Surgical History:         Cholecystectomy    Tubal Ligation   Uterine Ablation         Family History:         Positive for Hyperlipidemia ( mother ).      Positive for Breast Cancer ( sister ).      Positive for Type 2 Diabetes ( mother ).          Social History:     Occupation: Disabled (due to fibromyalgia)     Marital Status:      Children: 2 children         Tobacco/Alcohol/Supplements:     Last Reviewed on 8/16/2019 10:17 AM by Manoj Ybarra    Tobacco: She has a past history of cigarette smoking; quit date:  Quit Date 2013.          Alcohol: Frequency:    '4-5 a week';         Substance Abuse History:     Last Reviewed on 8/16/2019 10:17 AM by Manoj Ybarra    NEGATIVE         Mental Health History:     Last Reviewed on 8/16/2019 10:17 AM by Manoj Ybarra        Communicable Diseases (eg STDs):     Last Reviewed on 8/16/2019 10:17 AM by Manoj Ybarra            Current Problems:     Last Reviewed on 8/16/2019 10:17 AM by Manoj Ybarra    Fever, unspecified     Low back pain     Fatigue     Systolic murmur     Episodic cluster headache     Chronic low back pain     HTN     Hypercholesterolemia     Fibromyalgia     Anxiety     Hearing loss     PMDD     Benign skin neoplasm, of scalp and neck     GERD     Fibrocystic disease of breasts     Polycystic ovarian disease     Benign essential tremor         Immunizations:     zzFluzone pf-quadrivalent 3 and up 10/5/2015     Fluzone (3 + years dose)  11/6/2008     Fluzone (3 + years dose) 11/2/2009     Fluzone (3 + years dose) 12/8/2011     Fluzone (3 + years dose) 10/16/2012     Fluzone (3 + years dose) 9/30/2016     Fluzone (3 + years dose) 9/13/2017     Fluzone pf (3+ years dose) 11/1/2013     Fluzone pf (3+ years dose) 11/9/2018     FluMist 12/11/2006         Allergies:     Last Reviewed on 8/16/2019 10:17 AM by Manoj Ybarra      No Known Drug Allergies.     ACE Inhibitors: cough (Adverse Reaction)        Current Medications:     Last Reviewed on 8/16/2019 10:17 AM by Manoj Ybarra    Gabapentin 300mg Capsules Take 1 capsule(s) by mouth tid     Hydrochlorothiazide (HCTZ) 12.5mg Capsules Take 1 capsule(s) by mouth daily     Lipitor 20mg Tablet Take 1 tablet(s) by mouth daily     Losartan 100mg Tablet Take 1 tablet(s) by mouth daily     Omeprazole 40mg Capsules, Extended Release Take 1 capsule(s) by mouth daily     Trazodone HCl 50mg Tablet 1 tab hs/prn     Carvedilol 6.25mg Tablet Take 1 tablet(s) by mouth bid     Naproxen Sodium 220mg Tablet 1 tab daily         OBJECTIVE:        Vitals:         Current: 10/22/2019 1:24:33 PM    Ht:  5 ft, 4 in;  Wt: 204.8 lbs;  BMI: 35.2    T: 98.1 F (oral);  BP: 112/56 mm Hg (right arm, sitting);  P: 76 bpm (right arm (BP Cuff), sitting);  sCr: 0.87 mg/dL;  GFR: 80.81        Exams:     PHYSICAL EXAM:     GENERAL: vital signs recorded - well developed, well nourished;  no apparent distress;     EYES: extraocular movements intact; conjunctiva and cornea are normal; PERRL;     E/N/T:  normal EACs, TMs, nasal/oral mucosa, teeth, gingiva, and oropharynx;     NECK: range of motion is decreased;  thyroid is non-palpable; there is some tenderness in the R neck - no focal finding over the cervical spine;     RESPIRATORY: normal respiratory rate and pattern with no distress; normal breath sounds with no rales, rhonchi, wheezes or rubs;     CARDIOVASCULAR: normal rate; rhythm is regular;  no systolic murmur; no  edema;     GASTROINTESTINAL: nontender; normal bowel sounds; no organomegaly;     LYMPHATIC: no enlargement of cervical or facial nodes; no supraclavicular nodes;     BREAST/INTEGUMENT: SKIN: no significant rashes or lesions; no suspicious moles;     NEUROLOGIC: mental status: alert and oriented x 3; cranial nerves II-XII grossly intact; Reflexes: biceps: 2+;  no focal weakness is apparent;         Procedures:     Vaccination against other viral diseases, Influenza     1. Influenza, seasonal PF (children 3 years to adult): 0.5 ml unit dose given IM in the right upper arm; administered by [facility] Regarding contraindications to an Influenza vaccine: Denies moderate/severe illness with/without fever; serious reaction to eggs, egg proteins, gentamicin, gelatin, arginine, neomycin or polymixin; serious reaction after recieving previous influenza vaccines; and history of Guillain-Barab[place] Syndrome.              ASSESSMENT           723.1   G44.89  Neck pain              DDx:     V04.81   Z23  Vaccination against other viral diseases, Influenza              DDx:         ORDERS:         Meds Prescribed:       Diclofenac Sodium 75mg Tablets, Enteric Coated Take 1 tablet(s) by mouth bid with food  #28 (Twenty Eight) tablet(s) Refills: 0       Norco (Hydrocodone/Acetaminophen) 5mg/325mg Tablet Take 1 tablet(s) by mouth q6h prn  #12 (Twelve) tablet(s) Refills: 0         Radiology/Test Orders:       [id number]  Radiologic examination, spine, cervical; minimum of four views  (Send-Out)           Other Orders:       [id number]  Influenza virus vaccine, quadrivalent, split virus, preservative free 3 years of age & older  (In-House)           Administration of influenza virus vaccine (x1)                 PLAN:          Neck pain         RADIOLOGY:  I have ordered a C-Spine x-ray series to be done today.      RECOMMENDATIONS given include: Today, we will move ahead with evaluation as noted below.  She is in quite a bit of pain.  I am going to  give her a few hydrocodone as noted, but this is not something I could continue long term.  We will plan to also give her diclofenac instead of naproxen for now.  We will see what the X-ray shows and then consider how to move forward.  I am aware of her THC use and we have discussed this, but she is in a lot of pain today.  We will be very cautious though, and it will not be my intention to refill hydrocodone for her..  She has missed work yesterday and today.  She will tentatively plan to return Thursday.           Prescriptions:       Diclofenac Sodium 75mg Tablets, Enteric Coated Take 1 tablet(s) by mouth bid with food  #28 (Twenty Eight) tablet(s) Refills: 0 - ulcer risk, instead of naproxen       Norco (Hydrocodone/Acetaminophen) 5mg/325mg Tablet Take 1 tablet(s) by mouth q6h prn  #12 (Twelve) tablet(s) Refills: 0           Orders:       [id number]  Radiologic examination, spine, cervical; minimum of four views  (Send-Out)            Vaccination against other viral diseases, Influenza           Orders:       [id number]  Influenza virus vaccine, quadrivalent, split virus, preservative free 3 years of age & older  (In-House)                     Administration of influenza virus vaccine (x1)           Patient Education Handouts:       Influenza              CHARGE CAPTURE           **Please note: ICD descriptions below are intended for billing purposes only and may not represent clinical diagnoses**        Primary Diagnosis:         723.1 Neck pain            G44.89    Other headache syndrome              Orders:          [id number]   Office/outpatient visit; established patient, level 4  (In-House)           V04.81 Vaccination against other viral diseases, Influenza            Z23    Encounter for immunization              Orders:          [id number]   Influenza virus vaccine, quadrivalent, split virus, preservative free 3 years of age & older  (In-House)                                           Administration of  influenza virus vaccine (x1)

## 2021-05-18 NOTE — PROGRESS NOTES
Chely Calderón 1960     Office/Outpatient Visit    Visit Date: Tue, Jan 2, 2018 06:12 pm    Provider: Aimee Beach N.P. (Assistant: Jessica Rios MA)    Location: [facility]        Electronically signed by Aimee Beach N.P. on  01/02/2018 09:33:56 PM                             SUBJECTIVE:        CC:     Ms. Calderón is a 57 year old Black or  female.  Cough, body aches, fever x 1 day;         HPI:         Patient to be evaluated for cough.  The cough has been present since yesterday.  Respiratory symptoms include recent, dry cough.   She denies sinus pressure or wheezing.  Other symptoms include body aches, fever to 102 degrees, headache, nasal congestion and scratchy throat.  She denies allergy symptoms, chest congestion, ear complaints, nasal discharge, sinus pain/pressure or wheezing.  She reports recent exposure to illness from family members.  She has already tried to relieve the symptoms with advil.      ROS:     CONSTITUTIONAL:  Positive for chills, fatigue and fever.      E/N/T:  Positive for nasal congestion and scratchy throat.   Negative for frequent rhinorrhea.      CARDIOVASCULAR:  Negative for chest pain, palpitations, tachycardia, orthopnea, and edema.      RESPIRATORY:  Positive for recent cough ( typically dry ).      GASTROINTESTINAL:  Negative for diarrhea, nausea and vomiting.      GENITOURINARY:  Negative for dysuria and frequent urination.      MUSCULOSKELETAL:  Positive for myalgias.      NEUROLOGICAL:  Positive for headaches.   Negative for dizziness or fainting.          PMH/FMH/SH:     Last Reviewed on 10/04/2017 02:22 PM by Jonna Russell    Past Medical History:                 PAST MEDICAL HISTORY         Hyperlipidemia: Hypercholesterolemia;     Hypertension     Fibromyalgia         CURRENT MEDICAL PROVIDERS:    Cardiologist: DILSHAD in past, not currrently    Ophthalmologist: [facility]             BONE  DENSITY: was last done 2/2017 with normal results     COLORECTAL CANCER SCREENING: colonoscopy with the following abnormalities noted-- diverticulosis     EYE EXAM: was last done 5/16/17     MAMMOGRAM: was last done 2/2017 with normal results     PAP SMEAR: was last done 4/2017 with normal results         Surgical History:         Cholecystectomy    Tubal Ligation   Uterine Ablation         Family History:         Positive for Hyperlipidemia ( mother ).      Positive for Breast Cancer ( sister ).      Positive for Type 2 Diabetes ( mother ).          Social History:     Occupation: Disabled (due to fibromyalgia)     Marital Status:      Children: 2 children         Tobacco/Alcohol/Supplements:     Last Reviewed on 11/29/2017 09:05 AM by France Bellamy    Tobacco: She has a past history of cigarette smoking; quit date:  Quit Date 2013.          Alcohol: Frequency:    '4-5 a week';         Substance Abuse History:     Last Reviewed on 10/04/2017 02:22 PM by Jonna Russell    NEGATIVE         Mental Health History:     Last Reviewed on 10/04/2017 02:22 PM by Jonna Russell        Communicable Diseases (eg STDs):     Last Reviewed on 10/04/2017 02:22 PM by Jonna Russell            Current Problems:     Last Reviewed on 10/04/2017 02:22 PM by Jonna Russell    Low back pain     Dysuria     Fatigue     Systolic murmur     Episodic cluster headache     Chronic low back pain     HTN     Hypercholesterolemia     Fibromyalgia     Anxiety     Hearing loss     PMDD     Benign skin neoplasm, of scalp and neck     GERD     Fibrocystic disease of breasts     Polycystic ovarian disease     Benign essential tremor     Insomnia         Immunizations:     Fluzone pf-quadrivalent 3 and up 10/5/2015     Fluzone (3 + years dose) 11/6/2008     Fluzone (3 + years dose) 11/2/2009     Fluzone (3 + years dose) 12/8/2011     Fluzone (3 + years dose) 10/16/2012     Fluzone (3 + years dose) 9/30/2016     Fluzone (3 +  years dose) 9/13/2017     Fluzone pf (3+ years dose) 11/1/2013     FluMist 12/11/2006         Allergies:     Last Reviewed on 1/02/2018 06:15 PM by Jessica Rios      No Known Drug Allergies.     ACE Inhibitors: cough (Adverse Reaction)        Current Medications:     Last Reviewed on 10/04/2017 02:22 PM by Jonna Russell    Hydrochlorothiazide (HCTZ) 12.5mg Capsules Take 1 capsule(s) by mouth daily     Lipitor 20mg Tablet 1 tab daily     Losartan 50mg Tablet 1 tab daily     Metoprolol Succinate 25mg Tablets, Extended Release Take 1 tablet(s) bid     Omeprazole 40mg Capsules, Extended Release 1 capsule daily     Trazodone HCl 50mg Tablet 1 tab hs/prn         OBJECTIVE:        Vitals:         Historical:     11/29/2017  BP:   146/84 mm Hg ( (right arm, , sitting, );)     11/29/2017  Wt:   200.8lbs        Current: 1/2/2018 6:15:14 PM    Ht:  5 ft, 4 in;  Wt: 202.2 lbs;  BMI: 34.7    T: 102.2 F (oral);  BP: 161/92 mm Hg (left arm, sitting);  P: 113 bpm (left arm (BP Cuff), sitting);  sCr: 0.93 mg/dL;  GFR: 76.95        Exams:     PHYSICAL EXAM:     GENERAL: tired-appearing;     E/N/T: EARS: bilateral TMs are normal;  NOSE: nasal mucosa is erythematous;  no sinus tenderness; OROPHARYNX: posterior pharynx, including tonsils, tongue, and uvula are normal;     RESPIRATORY: normal respiratory rate and pattern with no distress; normal breath sounds with no rales, rhonchi, wheezes or rubs;     CARDIOVASCULAR: normal rate; rhythm is regular;     LYMPHATIC: no enlargement of cervical or facial nodes;     MUSCULOSKELETAL:  Normal range of motion, strength and tone;     NEUROLOGIC: mental status: alert and oriented x 3; GROSSLY INTACT     PSYCHIATRIC:  appropriate affect and demeanor; normal speech pattern; grossly normal memory;         Lab/Test Results:             Influenza A and B:  Negative (01/02/2018),     Performed by::  tls (01/02/2018),             ASSESSMENT           780.60   R50.9  Fever, unspecified               DDx:     786.2   R05  Cough              DDx:         ORDERS:         Meds Prescribed:       Tamiflu (Oseltamivir) 75mg Capsules Take 1 capsule(s) by mouth bid for 5 days  #10 (Ten) capsule(s) Refills: 0         Lab Orders:       [id number]  Infectious agent antigen detection by immunoassay; Influenza  (In-House)         14255-60  Infectious agent antigen detection by immunoassay; Influenza  (In-House)         [id number]  Group A Streptococcus detection by immunoassay with direct optical observation  (In-House)                   PLAN: work note x 2-3 days.          Fever, unspecified         RECOMMENDATIONS given include: negative strep and flu.  throat culture pending.  appears flu like.  advise tx presumptively with tamiflu.  symptomatic tx with coricidin hbp and flonase.  has tessalon perles at home that helped previously.. declines needing refill.  states she is not sure if she wants to take tamiflu or not.  whether to take or not is up to her.  she has viral illness that could be flu with false negative result.  follow up if not improving..            Orders:       [id number]  Infectious agent antigen detection by immunoassay; Influenza  (In-House)         70199-09  Infectious agent antigen detection by immunoassay; Influenza  (In-House)         50245  Group A Streptococcus detection by immunoassay with direct optical observation  (In-House)            Cough           Prescriptions:       Tamiflu (Oseltamivir) 75mg Capsules Take 1 capsule(s) by mouth bid for 5 days  #10 (Ten) capsule(s) Refills: 0             CHARGE CAPTURE           **Please note: ICD descriptions below are intended for billing purposes only and may not represent clinical diagnoses**        Primary Diagnosis:         780.60 Fever, unspecified            R50.9    Fever, unspecified              Orders:          [id number]   Office/outpatient visit; established patient, level 3  (In-House)             20282   Infectious agent antigen detection by immunoassay;  Influenza  (In-House)             11213 -59  Infectious agent antigen detection by immunoassay; Influenza  (In-House)             48000   Group A Streptococcus detection by immunoassay with direct optical observation  (In-House)           786.2 Cough            R05    Cough

## 2021-05-18 NOTE — PROGRESS NOTES
Mirian Calderónn ADINA  1960     Office/Outpatient Visit    Visit Date: Mon, Mar 23, 2020 01:58 pm    Provider: Manoj Ybarra MD (Assistant: Cecilia Richmond RN)    Location: [facility]        Electronically signed by Manoj Ybarra MD on  03/23/2020 04:47:13 PM                             Subjective:        CC: blood pressure, cholesterol, back pain        TELEMEDICINE VISIT:    - Chely consented to this telemedicine visit.    - Persons present during the telemedicine consultation include:  Chely - patient, Dr. Ybarra    - This visit is being done via [employer] with video and audio    HPI:           Patient presents with essential (primary) hypertension.  Her current cardiac medication regimen includes a diuretic ( Hydrodiuril ), a beta-blocker ( Coreg ), and an angiotensin receptor blocker ( Cozaar ).  She did not bring her blood pressure diary, but says that pressures have been well controlled.  She is tolerating the medication well without side effects.  Compliance with treatment has been good; she takes her medication as directed and follows up as directed.            With regard to the mixed hyperlipidemia, current treatment includes Lipitor.  Compliance with treatment has been good; she takes her medication as directed and follows up as directed.        Chely has a history of fibromyalgia and lower back pain for which she remains on gabapentin.  She has been taking that more regularly - up to three times per day.  She has noted some increased dizziness from gabapentin.  She did have some change in her diuretic recently as well.  She is not sure which is causing that.  Nathan is okay.    ROS:     CONSTITUTIONAL:  Negative for chills and fever.      CARDIOVASCULAR:  Negative for chest pain and palpitations.      RESPIRATORY:  Negative for recent cough and dyspnea.      GASTROINTESTINAL:  Negative for abdominal pain, nausea and vomiting.      INTEGUMENTARY/BREAST:   Negative for atypical mole(s) and rash.          Past Medical History / Family History / Social History:         Last Reviewed on 3/23/2020 02:45 PM by Manoj Ybarra    Past Medical History:                 PAST MEDICAL HISTORY         Hyperlipidemia: Hypercholesterolemia;     Hypertension     Fibromyalgia         CURRENT MEDICAL PROVIDERS:    Cardiologist: Dr. Wilson    Ophthalmologist: Manish             ADVANCED DIRECTIVES: None - Her children would make decisions for her if needed.         PREVENTIVE HEALTH MAINTENANCE             BONE DENSITY: was last done 2/2017 with normal results     COLORECTAL CANCER SCREENING: Up to date (colonoscopy q10y; sigmoidoscopy q5y; Cologuard q3y) was last done 03/2017, Results are in chart; colonoscopy with the following abnormalities noted-- diverticulosis     EYE EXAM: was last done 5/16/17     MAMMOGRAM: Done within last 2 years and results in are chart was last done 12/31/19 with normal results     PAP SMEAR: was last done 4/2017 with normal results         Surgical History:         Cholecystectomy    Tubal Ligation  Uterine Ablation         Family History:         Positive for Hyperlipidemia ( mother ).      Positive for Breast Cancer ( sister ).      Positive for Type 2 Diabetes ( mother ).          Social History:     Occupation: Disabled (due to fibromyalgia)     Marital Status:      Children: 2 children         Tobacco/Alcohol/Supplements:     Last Reviewed on 3/23/2020 02:45 PM by Manoj Ybarra    Tobacco: She has a past history of cigarette smoking; quit date:  Quit Date 2013.          Alcohol: Frequency:    '4-5 a week';         Substance Abuse History:     Last Reviewed on 3/23/2020 02:45 PM by Manoj Ybarra    NEGATIVE         Mental Health History:     Last Reviewed on 3/23/2020 02:45 PM by Manoj Ybarra        Communicable Diseases (eg STDs):     Last Reviewed on 3/23/2020 02:45 PM by Manoj Ybarra         Current Problems:     Last Reviewed on 3/23/2020 02:45 PM by Manoj Ybarra    Essential (primary) hypertension    Gastro-esophageal reflux disease without esophagitis    Mixed hyperlipidemia    Fibromyalgia    Other specified anxiety disorders    Other fatigue    Low back pain    Fever, unspecified        Immunizations:     zzFluzone pf-quadrivalent 3 and up 10/5/2015    Fluzone (3 + years dose) 11/6/2008    Fluzone (3 + years dose) 11/2/2009    Fluzone (3 + years dose) 12/8/2011    Fluzone (3 + years dose) 10/16/2012    Fluzone (3 + years dose) 9/30/2016    Fluzone (3 + years dose) 9/13/2017    Fluzone pf (3+ years dose) 11/1/2013    Fluzone pf (3+ years dose) 11/9/2018    Fluzone pf (3+ years dose) 10/22/2019    FluMist 12/11/2006        Allergies:     Last Reviewed on 3/23/2020 02:45 PM by Manoj Ybarra    ACE Inhibitors: cough  (Adverse Reaction)        Current Medications:     Last Reviewed on 3/23/2020 02:45 PM by Manoj Ybarra    Lipitor 20mg Tablet [Take 1 tablet(s) by mouth daily]    omeprazole 40 mg oral capsule,delayed release (enteric coated) [TAKE 1 CAPSULE EVERY DAY]    hydroCHLOROthiazide 25 mg oral tablet [take 1 tablet (25 mg) by oral route once daily]    atorvastatin 20 mg oral tablet [TAKE 1 TABLET EVERY DAY]    Losartan 100 mg oral tablet [Take 1 tablet(s) by mouth daily]    traZODone 50 mg oral tablet [TAKE 1 TABLET AS NEEDED AT BEDTIME]    gabapentin 300 mg oral capsule [TAKE 1 CAPSULE THREE TIMES DAILY]    cyclobenzaprine 10 mg oral tablet [ 1 tablet TID prn]    carvedilol 6.25 mg oral tablet [Take 1 tablet(s) by mouth bid]    aspirin 81 mg oral tablet, delayed release (enteric coated) [take 1 tablet (81 mg) by oral route once daily]    Calcium Citrate + D 315-200 mg-unit oral tablet [once daily]        Objective:        Exams:     PHYSICAL EXAM:     GENERAL: vital signs recorded - well developed, well nourished;  no apparent distress;     NEUROLOGIC: mental status:  alert and oriented x 3; cranial nerves II-XII grossly intact;     PSYCHIATRIC: appropriate affect and demeanor; normal psychomotor function;         Assessment:         I10   Essential (primary) hypertension       K21.9   Gastro-esophageal reflux disease without esophagitis       E78.2   Mixed hyperlipidemia       M79.7   Fibromyalgia       F41.8   Other specified anxiety disorders       R23.3   Spontaneous ecchymoses           ORDERS:         Meds Prescribed:       [Refilled] Losartan 100 mg oral tablet [Take 1 tablet(s) by mouth daily], #90 (ninety) tablets, Refills: 1 (one)       [Refilled] carvediloL 6.25 mg oral tablet [Take 1 tablet(s) by mouth bid], #180 (one hundred and eighty) tablets, Refills: 1 (one)       [Refilled] omeprazole 40 mg oral capsule,delayed release (enteric coated) [take 1 capsule (40 mg) by oral route once daily before a meal], #90 (ninety) capsules, Refills: 1 (one)       [Refilled] traZODone 50 mg oral tablet [take 1 tablet (50 mg) by oral route once daily at bedtime], #90 (ninety) tablets, Refills: 1 (one)       [Refilled] atorvastatin 20 mg oral tablet [TAKE 1 TABLET EVERY DAY], #90 (ninety) tablets, Refills: 1 (one)       [Refilled] gabapentin 300 mg oral capsule [take 1 capsule (300 mg) by oral route 3 times per day], #270 (two hundred and seventy) capsules, Refills: 1 (one)       [Refilled] hydroCHLOROthiazide 25 mg oral tablet [take 1 tablet (25 mg) by oral route once daily], #90 (ninety) tablets, Refills: 1 (one)         Radiology/Test Orders:       3017F  Colorectal CA screen results documented and reviewed (PV)  (In-House)              Lab Orders:       [id number]  CK - [facility]- CK total  (Send-Out)            29127  HTNLP - [facility] CMP AND LIPID: [id number], [id number]  (Send-Out)            65696  TSH - H TSH  (Send-Out)            79189  BDCB2 - [facility] CBC w/o diff  (Send-Out)            37480  PTPTT - [facility] PT AND PTT  (Send-Out)              Other Orders:         Screening mammogram results  documented  (Send-Out)                      Plan:         Essential (primary) hypertension        RECOMMENDATIONS given include: Today, we have reviewed Chely's care.  I'm going to refill her medications as noted.  We will also arrange labs as noted to be done at her convenience.  No changes are anticipated.  She seems well..  MIPS Vaccines Flu and Pneumonia updated in Shot record Screening mammomgram done within last 2 years and results in are chart Colorectal Cancer Screening is up to date and the results are in the chart           Prescriptions:       [Refilled] Losartan 100 mg oral tablet [Take 1 tablet(s) by mouth daily], #90 (ninety) tablets, Refills: 1 (one)       [Refilled] carvediloL 6.25 mg oral tablet [Take 1 tablet(s) by mouth bid], #180 (one hundred and eighty) tablets, Refills: 1 (one)       [Refilled] omeprazole 40 mg oral capsule,delayed release (enteric coated) [take 1 capsule (40 mg) by oral route once daily before a meal], #90 (ninety) capsules, Refills: 1 (one)       [Refilled] traZODone 50 mg oral tablet [take 1 tablet (50 mg) by oral route once daily at bedtime], #90 (ninety) tablets, Refills: 1 (one)       [Refilled] atorvastatin 20 mg oral tablet [TAKE 1 TABLET EVERY DAY], #90 (ninety) tablets, Refills: 1 (one)       [Refilled] gabapentin 300 mg oral capsule [take 1 capsule (300 mg) by oral route 3 times per day], #270 (two hundred and seventy) capsules, Refills: 1 (one)       [Refilled] hydroCHLOROthiazide 25 mg oral tablet [take 1 tablet (25 mg) by oral route once daily], #90 (ninety) tablets, Refills: 1 (one)           Orders:         Screening mammogram results documented  (Send-Out)            3017F  Colorectal CA screen results documented and reviewed (PV)  (In-House)              Mixed hyperlipidemia    LABORATORY:  Labs ordered to be performed today include CK, total, HTN/Lipid Panel: CMP, Lipid, and TSH.            Orders:       [id number]  CK - [facility]- CK total  (Send-Out)             19313  HTNLP - H CMP AND LIPID: [id number], [id number]  (Send-Out)            39775  TSH - [facility] TSH  (Send-Out)              FibromyalgiaAs above.        Other specified anxiety disordersAs above.        Spontaneous ecchymoses    LABORATORY:  Labs ordered to be performed today include CBC W/O DIFF and PT and PTT.            Orders:       [id number]  BDCB2 - H CBC w/o diff  (Send-Out)            15672  PTPTT - H PT AND PTT  (Send-Out)                  Charge Capture:         Primary Diagnosis:     I10  Essential (primary) hypertension           Orders:      [id number]  Office/outpatient visit; established patient, level 4  (In-House)            3017F  Colorectal CA screen results documented and reviewed (PV)  (In-House)              K21.9  Gastro-esophageal reflux disease without esophagitis     E78.2  Mixed hyperlipidemia     M79.7  Fibromyalgia     F41.8  Other specified anxiety disorders     R23.3  Spontaneous ecchymoses         ADDENDUMS:      ____________________________________    Addendum: 03/25/2020 07:01 AM - Manoj Ybarra        To clarify - vital signs were not done as part of this visit.  Thanks.

## 2021-05-18 NOTE — PROGRESS NOTES
Chely Calderón 1960     Office/Outpatient Visit    Visit Date: Fri, Aug 16, 2019 09:52 am    Provider: Manoj Ybarra MD (Assistant: Cecilia Richmond RN)    Location: [facility]        Electronically signed by Manoj Ybarra MD on  08/17/2019 07:39:43 AM                             SUBJECTIVE:        CC: physical exam, blood pressure, cholesterol, low back pain         HPI:         Ms. Calderón is here for a Medicare wellness visit.  The required HRA questions are integrated within this visit note. Family medical history and individual medical/surgical history were reviewed and updated.  A current height, weight, BMI, blood pressure, and pulse were recorded in the vitals section of the note and have been reviewed. Patient's medications, including supplements, were recorded in the chart and reviewed.  Current providers and suppliers were reviewed and updated.          Self-Assessment of Health: She rates her health as poor. She rates her confidence of being able to control/manage most of her health problems as somewhat confident. Her physical/emotional health has limited her social activites quite a bit.  A review of possible cognitive impairment was performed and the following was noted: she is not having trouble driving;  not experiencing changes in memory;  she is not having trouble with her finances A review of functional ability, including bathing, dressing, walking, and urine/bowel continence as well as level of safety was performed and was found to be negative.  Falls Risk: Has not had any falls or only one fall without injury in the past year.  She denies having trouble hearing the TV/radio when others do not, having to strain to hear or understand conversations and wearing hearing aid(s).  Concerning home safety, she reports that at home she DOES have adequate lighting, a skid resistant shower/tub, grab bars in the bath and functioning smoke alarms, but not absence of throw rugs.           Immunization Status: ** >10 years since last Td booster; Physical Activity: She exercises but less than 20 minutes 3 days per week; Type of diet patient normally eats is described as well-balanced with fruits and vegetables Tobacco: She has a past history of cigarette smoking; quit date:  about 20 years ago.          Chely is concerned about ongoing lower back pain.  She says that she has constant pain across the lower back.  It radiates into her R hip and leg sometimes.  This has been more of a problem for her for the last 3 months.  She has seen Dr. Lopes for this and was told it may be her back.  She did have some X-rays through his office, but she is not sure.  She is currently taking gabapentin for this.  She does feel there is some benefit from this.  She did take a steroid pack that also gave some improvement.  She denies any personal history of addiction issues.  She denies family history.  She has been doing physical therapy for the last 7 weeks for the back and is not seeing much improvement.  She does have overlying fibromyalgia which complicates the clinical picture.         Dx with hTN; her current cardiac medication regimen includes a diuretic ( Hydrodiuril ), a beta-blocker ( Coreg ), and an angiotensin receptor blocker ( Cozaar ).  She did not bring her blood pressure diary, but says that pressures have been too high.  She is tolerating the medication well without side effects.  Compliance with treatment has been good; she takes her medication as directed and follows up as directed.          In regard to the hypercholesterolemia, current treatment includes Lipitor.  Compliance with treatment has been good; she takes her medication as directed and follows up as directed.          She does have a positive depression screening today, but she feels that is mostly due to pain.  She is currently taking trazodone at night as needed, but she does not take other antidepressant.     ROS:     CONSTITUTIONAL:   Negative for chills and fever.      CARDIOVASCULAR:  Negative for chest pain and palpitations.      RESPIRATORY:  Negative for recent cough and dyspnea.      GASTROINTESTINAL:  Negative for abdominal pain, nausea and vomiting.      INTEGUMENTARY/BREAST:  Negative for atypical mole(s) and rash.          PMH/FMH/SH:     Last Reviewed on 8/16/2019 10:17 AM by Manoj Ybarra    Past Medical History:                 PAST MEDICAL HISTORY         Hyperlipidemia: Hypercholesterolemia;     Hypertension     Fibromyalgia         CURRENT MEDICAL PROVIDERS:    Cardiologist: Dr. Wilson    Ophthalmologist: Manish             ADVANCED DIRECTIVES: None - Her children would make decisions for her if needed.         PREVENTIVE HEALTH MAINTENANCE             BONE DENSITY: was last done 2/2017 with normal results     COLORECTAL CANCER SCREENING: Up to date (colonoscopy q10y; sigmoidoscopy q5y; Cologuard q3y) was last done 03/2017, Results are in chart; colonoscopy with the following abnormalities noted-- diverticulosis     EYE EXAM: was last done 5/16/17     MAMMOGRAM: Done within last 2 years and results in are chart was last done 11/16/18 with normal results     PAP SMEAR: was last done 4/2017 with normal results         Surgical History:         Cholecystectomy    Tubal Ligation   Uterine Ablation         Family History:         Positive for Hyperlipidemia ( mother ).      Positive for Breast Cancer ( sister ).      Positive for Type 2 Diabetes ( mother ).          Social History:     Occupation: Disabled (due to fibromyalgia)     Marital Status:      Children: 2 children         Tobacco/Alcohol/Supplements:     Last Reviewed on 8/16/2019 10:17 AM by Manoj Ybarra    Tobacco: She has a past history of cigarette smoking; quit date:  Quit Date 2013.          Alcohol: Frequency:    '4-5 a week';         Substance Abuse History:     Last Reviewed on 8/16/2019 10:17 AM by Manoj Ybarra    NEGATIVE          Mental Health History:     Last Reviewed on 8/16/2019 10:17 AM by Manoj Ybarra        Communicable Diseases (eg STDs):     Last Reviewed on 8/16/2019 10:17 AM by Manoj Ybarra            Current Problems:     Last Reviewed on 8/16/2019 10:17 AM by Manoj Ybarra    Fever, unspecified     Low back pain     Fatigue     Systolic murmur     Episodic cluster headache     Chronic low back pain     HTN     Hypercholesterolemia     Fibromyalgia     Anxiety     Hearing loss     PMDD     Benign skin neoplasm, of scalp and neck     GERD     Fibrocystic disease of breasts     Polycystic ovarian disease     Benign essential tremor     Costochondritis         Immunizations:     zzFluzone pf-quadrivalent 3 and up 10/5/2015     Fluzone (3 + years dose) 11/6/2008     Fluzone (3 + years dose) 11/2/2009     Fluzone (3 + years dose) 12/8/2011     Fluzone (3 + years dose) 10/16/2012     Fluzone (3 + years dose) 9/30/2016     Fluzone (3 + years dose) 9/13/2017     Fluzone pf (3+ years dose) 11/1/2013     Fluzone pf (3+ years dose) 11/9/2018     FluMist 12/11/2006         Allergies:     Last Reviewed on 8/16/2019 10:17 AM by Manoj Ybarra      No Known Drug Allergies.     ACE Inhibitors: cough (Adverse Reaction)        Current Medications:     Last Reviewed on 8/16/2019 10:17 AM by Manoj Ybarra    Hydrochlorothiazide (HCTZ) 12.5mg Capsules Take 1 capsule(s) by mouth daily     Lipitor 20mg Tablet Take 1 tablet(s) by mouth daily     Losartan 100mg Tablet Take 1 tablet(s) by mouth daily     Omeprazole 40mg Capsules, Extended Release Take 1 capsule(s) by mouth daily     Trazodone HCl 50mg Tablet 1 tab hs/prn     Gabapentin 300mg Capsules 1 cap TID     Naproxen Sodium 220mg Tablet 1 tab daily     Carvedilol 6.25mg Tablet Take 1 tablet(s) by mouth bid         OBJECTIVE:        Vitals:         Current: 8/16/2019 9:55:31 AM    Ht:  5 ft, 4 in;  Wt: 202.2 lbs;  BMI: 34.7    T: 97.7 F (oral);  BP:  133/74 mm Hg (right arm, sitting);  P: 66 bpm (right arm (BP Cuff), sitting);  sCr: 0.87 mg/dL;  GFR: 81.32        Exams:     PHYSICAL EXAM:     GENERAL: vital signs recorded - well developed, well nourished;  no apparent distress;     EYES: extraocular movements intact; conjunctiva and cornea are normal; PERRL;     E/N/T:  normal EACs, TMs, nasal/oral mucosa, teeth, gingiva, and oropharynx;     NECK: range of motion is normal; thyroid is non-palpable;     RESPIRATORY: normal respiratory rate and pattern with no distress; normal breath sounds with no rales, rhonchi, wheezes or rubs;     CARDIOVASCULAR: normal rate; rhythm is regular;  no systolic murmur; no edema;     GASTROINTESTINAL: nontender; normal bowel sounds; no organomegaly;     LYMPHATIC: no enlargement of cervical or facial nodes; no supraclavicular nodes;     BREAST/INTEGUMENT: SKIN: no significant rashes or lesions; no suspicious moles;     MUSCULOSKELETAL: there is decreased ROM in the lower back - mild tenderness is apparent - there is not other worrisome finding;     NEUROLOGIC: mental status: alert and oriented x 3; cranial nerves II-XII grossly intact;     PSYCHIATRIC: appropriate affect and demeanor; normal psychomotor function;         Lab/Test Results:             Amphetamines Screen, Urin:  Negative (08/16/2019),     BAR-Barbiturates Screen, Urin:  Negative (08/16/2019),     Buprenorphine:  Negative (08/16/2019),     BZO-Benzodiazepines Screen,Ur:  Negative (08/16/2019),     Cocaine(Metab.)Screen, Ur:  Negative (08/16/2019),     MDMA-Ecstasy:  Negative (08/16/2019),     Met-Methamphetamine:  Negative (08/16/2019),     MTD-Methadone Screen, Urine:  Negative (08/16/2019),     Opiate Screen, Urine:  Negative (08/16/2019),     OXY-Oxycodone:  Negative (08/16/2019),     PCP-Phencyclidine Screen, Uri:  Negative (08/16/2019),     THC Cannabinoids Screen, Urin:  Positive (08/16/2019),     Urine temperature:  confirmed (08/16/2019),     Date and time of  last pill:  gabapentin-8/16/19 @ 9am/amy (08/16/2019),     Performed by:  tls (08/16/2019),     Collection Time:  1025 (08/16/2019),     Total Cholesterol:  166 (08/16/2019),     HDL:  51 (08/16/2019),     Triglycerides:  109 (08/16/2019),     LDL:  93 (08/16/2019),     non-HDL:  115 (08/16/2019),     LDL/HDL Ratio:  1.8 (08/16/2019),     Performed by::  tls (08/16/2019),     Hemoglobin A1c:  5.8 (08/16/2019),             ASSESSMENT           V70.0   Z00.01  Yearly physical exam              DDx:     724.2   M54.5  Low back pain              DDx:     401.1   I10  HTN              DDx:     272.0   E78.2  Hypercholesterolemia              DDx:     530.81   K21.9  GERD              DDx:     729.1   M79.7  Fibromyalgia              DDx:     790.6   R73.01  Hyperglycemia              DDx:         ORDERS:         Meds Prescribed:       Refill of: Hydrochlorothiazide (HCTZ) 12.5mg Capsules Take 1 capsule(s) by mouth daily  #90 (Ninety) capsule(s) Refills: 1       Refill of: Lipitor (Atorvastatin Calcium) 20mg Tablet Take 1 tablet(s) by mouth daily  #90 (Ninety) tablet(s) Refills: 1       Refill of: Losartan 100mg Tablet Take 1 tablet(s) by mouth daily  #90 (Ninety) tablet(s) Refills: 1       Refill of: Omeprazole 40mg Capsules, Extended Release Take 1 capsule(s) by mouth daily  #90 (Ninety) capsule(s) Refills: 1       Refill of: Trazodone HCl 50mg Tablet 1 tab hs/prn  #90 (Ninety) tablet(s) Refills: 1       Refill of: Gabapentin 300mg Capsules Take 1 capsule(s) by mouth tid  #90 (Ninety) capsule(s) Refills: 1         Radiology/Test Orders:       3014F  Screening mammography results documented and reviewed (PV)1  (In-House)         3017F  Colorectal CA screen results documented and reviewed (PV)  (In-House)         46903  Radiologic examination, spine, lumbosacral;  minimum of four views  (Send-Out)           Lab Orders:       36961  [facility] - [facility] Urinalysis, automated, with micro  (Send-Out)         75922  Drug test prsmv  qual dir optical obs per day  (In-House)         53424  LPDP - [facility] Lipid Panel  (In-House)         91545*  Hgb A1c fast lab  (In-House)         09744  THCCO - [facility] 74255 THC METABOLITE  (Send-Out)           Procedures Ordered:       86238  Collection of capillary blood specimen (eg, finger, heel, ear stick)  (In-House)           Other Orders:         Depression screen negative  (In-House)         1101F  Pt screen for fall risk; document no falls in past year or only 1 fall w/o injury in past year (VIRA)  (In-House)           Negative EtOH screen  (In-House)                   PLAN:          Yearly physical exam     Please see attached wellness recommendations.  We will refill gabapentin for her as noted and move ahead with additional evaluation for her pain as noted.  We will do limited blood work and other testing as she had fairly extensive labs in the ER recently.  We will go from there.      MIPS PHQ-9 Depression Screening: Completed form scanned and in chart; Total Score 11; Positive Depression Screen but after further evaluation the patient does not have a diagnosis of depression.  Negative alcohol screen           Orders:         Depression screen negative  (In-House)         1101F  Pt screen for fall risk; document no falls in past year or only 1 fall w/o injury in past year (VIRA)  (In-House)           Negative EtOH screen  (In-House)         3014F  Screening mammography results documented and reviewed (PV)1  (In-House)         3017F  Colorectal CA screen results documented and reviewed (PV)  (In-House)            Low back pain     LABORATORY:  Labs ordered to be performed today include Drug screen, Drug Screen Urine [facility] Confirmation THC METABOLITE, and urinalysis with micro.      RADIOLOGY:  I have ordered Lumbar/Sacral Spine X-ray to be done today.            Prescriptions:       Refill of: Gabapentin 300mg Capsules Take 1 capsule(s) by mouth tid  #90 (Ninety) capsule(s) Refills: 1            Orders:       16862  [facility] - [facility] Urinalysis, automated, with micro  (Send-Out)         01675  Drug test prsmv qual dir optical obs per day  (In-House)         87380  Radiologic examination, spine, lumbosacral;  minimum of four views  (Send-Out)         38579  THCCO - [facility] 16384 THC METABOLITE  (Send-Out)             Patient Education Handouts:       Controlled Prescription Drug education           HTN           Prescriptions:       Refill of: Hydrochlorothiazide (HCTZ) 12.5mg Capsules Take 1 capsule(s) by mouth daily  #90 (Ninety) capsule(s) Refills: 1       Refill of: Losartan 100mg Tablet Take 1 tablet(s) by mouth daily  #90 (Ninety) tablet(s) Refills: 1          Hypercholesterolemia     LABORATORY:  Labs ordered to be performed today include lipid panel.            Prescriptions:       Refill of: Lipitor (Atorvastatin Calcium) 20mg Tablet Take 1 tablet(s) by mouth daily  #90 (Ninety) tablet(s) Refills: 1           Orders:       575[facility]  [facility]  [facility]         82412  Collection of capillary blood specimen (eg, finger, heel, ear stick)  (In-House)            GERD           Prescriptions:       Refill of: Omeprazole 40mg Capsules, Extended Release Take 1 capsule(s) by mouth daily  #90 (Ninety) capsule(s) Refills: 1          Fibromyalgia           Prescriptions:       Refill of: Trazodone HCl 50mg Tablet 1 tab hs/prn  #90 (Ninety) tablet(s) Refills: 1          Hyperglycemia     LABORATORY:  Labs ordered to be performed today include Hgb A1c inhouse fast lab.            Orders:       44998*  Hgb A1c fast lab  (In-House)               CHARGE CAPTURE           **Please note: ICD descriptions below are intended for billing purposes only and may not represent clinical diagnoses**        Primary Diagnosis:         V70.0 Yearly physical exam            Z00.01    Encounter for general adult medical examination with abnormal findings              Orders:          16074   Preventive medicine, established  patient, age 40-64 years  (In-House)                Depression screen negative  (In-House)             1101F   Pt screen for fall risk; document no falls in past year or only 1 fall w/o injury in past year (VIRA)  (In-House)                Negative EtOH screen  (In-House)             3014F   Screening mammography results documented and reviewed (PV)1  (In-House)             3017F   Colorectal CA screen results documented and reviewed (PV)  (In-House)           724.2 Low back pain            M54.5    Low back pain              Orders:          56733 -25  Office/outpatient visit; established patient, level 4  (In-House)             67835   Drug test prsmv qual dir optical obs per day  (In-House)           401.1 HTN            I10    Essential (primary) hypertension    272.0 Hypercholesterolemia            E78.2    Mixed hyperlipidemia              Orders:          06453   [facility] Lipid Panel  (In-House)             89857   Collection of capillary blood specimen (eg, finger, heel, ear stick)  (In-House)           530.81 GERD            K21.9    Gastro-esophageal reflux disease without esophagitis    729.1 Fibromyalgia            M79.7    Fibromyalgia    790.6 Hyperglycemia            R73.01    Impaired fasting glucose              Orders:          31999*   Hgb A1c fast lab  (In-House)

## 2021-05-18 NOTE — PROGRESS NOTES
Chely Calderón 1960     Office/Outpatient Visit    Visit Date: Thu, May 10, 2018 02:31 pm    Provider: Arianna Marsh N.P. (Assistant: Emilie Horn MA)    Location: [facility]        Electronically signed by Arianna Marsh N.P. on  05/11/2018 02:12:01 PM                             SUBJECTIVE:        CC: Pt also seen by LIDA Hartmann NP Student     Ms. Calderón is a 57 year old Black or  female.  pain in shoulders/follow up from urgent care/ fibromialga;         HPI:         Patient to be evaluated for shoulder pain.  She complains of left shoulder pain.  .  Concerning shoulder pain, the pain initially started 2 months ago.  Pt. reports constant pain in L shoulder that began 2 months ago;  reports pain radiates down L arm, into wrist and hand;  Describes current pain level 10/10.   Denies injuring shoulder;  Reports she has been taking Naproxen and Tylenol at different intervals for pain, without relief; States she went to [facility] yesterday and received a Dexamethasone and Toradol injection. Reports some relief after injection that has now worsened this afternoon. Reports cold, movement, and pressure increases the pain.      Pt reports nausea with shoulder pain         Dx with chest pain, unspecified; reports chest pain 2 days ago that improved with rest;  reports mild chest pain at present;     ROS:     CONSTITUTIONAL:  Positive for fatigue ( moderate ).   Negative for chills, fever or night sweats.      EYES:  Negative for blurred vision.      CARDIOVASCULAR:  Negative for orthopnea, paroxysmal nocturnal dyspnea and pedal edema.      RESPIRATORY:  Negative for dyspnea.      GASTROINTESTINAL:  Negative for abdominal pain, constipation, diarrhea, nausea and vomiting.      NEUROLOGICAL:  Positive for weakness ( generalized ).      PSYCHIATRIC:  Positive for reports increased stress.          PMH/FMH/SH:     Last Reviewed on 5/10/2018 03:08 PM by Salma  Arianna    Past Medical History:                 PAST MEDICAL HISTORY         Hyperlipidemia: Hypercholesterolemia;     Hypertension     Fibromyalgia         CURRENT MEDICAL PROVIDERS:    Cardiologist: [facility] in past, not currrentldoug    Ophthalmologist: Manish         PREVENTIVE HEALTH MAINTENANCE             BONE DENSITY: was last done 2/2017 with normal results     COLORECTAL CANCER SCREENING: colonoscopy with the following abnormalities noted-- diverticulosis     EYE EXAM: was last done 5/16/17     MAMMOGRAM: was last done 2/2017 with normal results     PAP SMEAR: was last done 4/2017 with normal results         Surgical History:         Cholecystectomy    Tubal Ligation   Uterine Ablation         Family History:         Positive for Hyperlipidemia ( mother ).      Positive for Breast Cancer ( sister ).      Positive for Type 2 Diabetes ( mother ).          Social History:     Occupation: Disabled (due to fibromyalgia)     Marital Status:      Children: 2 children         Tobacco/Alcohol/Supplements:     Last Reviewed on 5/10/2018 03:08 PM by Arianna Marsh    Tobacco: She has a past history of cigarette smoking; quit date:  Quit Date 2013.          Alcohol: Frequency:    '4-5 a week';         Substance Abuse History:     Last Reviewed on 5/10/2018 03:08 PM by Arianna Marsh    NEGATIVE         Mental Health History:     Last Reviewed on 5/10/2018 03:08 PM by Arianna Marsh        Communicable Diseases (eg STDs):     Last Reviewed on 5/10/2018 03:08 PM by Arianna Marsh            Current Problems:     Last Reviewed on 5/10/2018 03:08 PM by Arianna Marsh    Fever, unspecified     Low back pain     Fatigue     Systolic murmur     Episodic cluster headache     Chronic low back pain     HTN     Hypercholesterolemia     Fibromyalgia     Anxiety     Hearing loss     PMDD     Benign skin neoplasm, of scalp and neck     GERD     Fibrocystic disease of breasts     Polycystic ovarian disease     Benign  essential tremor     Nausea     Shoulder pain     Abdominal pain, other specified site         Immunizations:     zzFluzone pf-quadrivalent 3 and up 10/5/2015     Fluzone (3 + years dose) 11/6/2008     Fluzone (3 + years dose) 11/2/2009     Fluzone (3 + years dose) 12/8/2011     Fluzone (3 + years dose) 10/16/2012     Fluzone (3 + years dose) 9/30/2016     Fluzone (3 + years dose) 9/13/2017     Fluzone pf (3+ years dose) 11/1/2013     FluMist 12/11/2006         Allergies:     Last Reviewed on 5/10/2018 03:08 PM by Arianna Marsh      No Known Drug Allergies.     ACE Inhibitors: cough (Adverse Reaction)        Current Medications:     Last Reviewed on 5/10/2018 03:08 PM by Arianna Marsh    Hydrochlorothiazide (HCTZ) 12.5mg Capsules Take 1 capsule(s) by mouth daily     Lipitor 20mg Tablet 1 tab daily     Losartan 50mg Tablet 1 tab daily     Metoprolol Succinate 25mg Tablets, Extended Release Take 1 tablet(s) bid     Omeprazole 40mg Capsules, Extended Release 1 capsule daily     Trazodone HCl 50mg Tablet 1 tab hs/prn         OBJECTIVE:        Vitals:         Current: 5/10/2018 2:34:45 PM    Ht:  5 ft, 4 in;  Wt: 197 lbs;  BMI: 33.8    T: 97.1 F (oral);  BP: 178/94 mm Hg (left arm, sitting);  P: 79 bpm (left arm (BP Cuff), sitting);  sCr: 0.88 mg/dL;  GFR: 80.43        Exams:     PHYSICAL EXAM:     GENERAL: vital signs recorded - well developed, well nourished;  anxious, seems to be in moderate pain, tearful;     EYES: extraocular movements intact; conjunctiva and cornea are normal; PERRLA;     NECK: range of motion is normal; thyroid is non-palpable;     RESPIRATORY: normal respiratory rate and pattern with no distress; normal breath sounds with no rales, rhonchi, wheezes or rubs;     CARDIOVASCULAR: normal rate; rhythm is regular;  no systolic murmur; no edema;     GASTROINTESTINAL: nontender; normal bowel sounds; no organomegaly;     NEUROLOGIC: GROSSLY INTACT     PSYCHIATRIC:  appropriate affect and demeanor;  normal speech pattern; grossly normal memory; LEFT SHOULDER examination: Inspection:  normal;     Palpation: pain elicited anteriorly and posteriorly;     Range of Motion: reports pain with ROM;         Lab/Test Results:         LABORATORY RESULTS: EKG performed by tls         ASSESSMENT:           719.41   M25.512  Shoulder pain              DDx:     787.02   R11.0  Nausea              DDx:     786.50   R07.9  Chest pain, unspecified              DDx:         ORDERS:         Meds Prescribed:       Refill of: Promethazine HCl 25mg Tablet 1/2 - 1 tab q 4-6 hrs prn   prn nausea and vomiting  #20 (Twenty) tablet(s) Refills: 0         Radiology/Test Orders:       [id number]  Electrocardiogram, routine with at least 12 leads; with interpretation and report  (In-House)         [id number]  Left Radiologic exam, shoulder; comp, 2 views  (Send-Out)         43177  Holter monitor connection and disconnection  (In-House)  (48 hour holter placed./tls)         Lab Orders:       FUTURE  Future order to be done at patients convenience  (Send-Out)         88045  BDCBC - [facility] CBC with 3 part diff  (Send-Out)         51117  [facility] - [facility] Comp. Metabolic Panel  (Send-Out)           Procedures Ordered:       REFER  Referral to Specialist or Other Facility  (Send-Out)                   PLAN:          Shoulder pain         RADIOLOGY:  I have ordered Shoulder x-ray: left shoulder to be done today.            Orders:       [id number]  Left Radiologic exam, shoulder; comp, 2 views  (Send-Out)            Nausea           Prescriptions:       Refill of: Promethazine HCl 25mg Tablet 1/2 - 1 tab q 4-6 hrs prn   prn nausea and vomiting  #20 (Twenty) tablet(s) Refills: 0          Chest pain, unspecified         TESTS/PROCEDURES:  Will proceed with an ECG and Holter Monitor 48 hour to be performed/scheduled now.      REFERRALS:  Referral initiated to a cardiologist ( to evaluate chest pain and R AV Block ).      FOLLOW-UP TESTING #1: FOLLOW-UP LABORATORY:  Labs  to be scheduled in the future include CBC and CMP.   Upon return of holter monitor           Orders:       [id number]  Electrocardiogram, routine with at least 12 leads; with interpretation and report  (In-House)         86118  Holter monitor connection and disconnection  (In-House)  (48 hour holter placed./tls)       REFER  Referral to Specialist or Other Facility  (Send-Out)         FUTURE  Future order to be done at patients convenience  (Send-Out)         99349  BDCB - [facility] CBC with 3 part diff  (Send-Out)         85588  COMP - [facility] Comp. Metabolic Panel  (Send-Out)             Patient Education Handouts:       Chest Pain        Electrocardiogram (ECG or EKG)        Holter Monitoring (Ambulatory ECG)              Patient Recommendations:        For  Chest pain, unspecified:     I also recommend to evaluate chest pain and R AV Block.          The following laboratory testing has been ordered: CBC metabolic panel, comprehensive             CHARGE CAPTURE:           Primary Diagnosis:     719.41 Shoulder pain            M25.512    Pain in left shoulder              Orders:          58076   Office/outpatient visit; established patient, level 4  (In-House)           787.02 Nausea            R11.0    Nausea    786.50 Chest pain, unspecified            R07.9    Chest pain, unspecified              Orders:          53671   Electrocardiogram, routine with at least 12 leads; with interpretation and report  (In-House)             26195   Holter monitor connection and disconnection  (In-House)  (48 hour holter placed./tls)             ADDENDUMS:      ____________________________________    Date: 05/11/2018 08:24 AM    Author: Zulema Rios         Visit Note Faxed to:        Jesse Wilson  (Cardiology); Number [phone]            Addendum: 05/14/2018 12:45 PM - Jessica Rios        Holter returned and uploaded to [facility].

## 2021-05-18 NOTE — PROGRESS NOTES
Chely Calderón  1960     Office/Outpatient Visit    Visit Date: Tue, Dec 22, 2020 12:47 pm    Provider: Manoj Ybarra MD (Assistant: Elena Morin MA)    Location: [facility]        Electronically signed by Manoj Ybarra MD on  12/23/2020 09:41:48 AM                             Subjective:        CC: medication refill, fibromyalgia        TELEMEDICINE VISIT:    - Chely consented to this telemedicine visit.    - Persons present during the telemedicine consultation include:  Chely - patient, Dr. Yabrra    - This visit is being conducted over telephone with audio only.Ms. Calderón is a 60 year old Black or  female.  [facility] [phone];         HPI:       Chely is being seen today for follow up on fibromyalgia and lower back pain for which she remains on gabapentin.  She has been taking that more regularly - up to three times per day.  She did try to go without gabapentin, but she remains in considerable pain.  She will be having epidural injection in January.  Nathan is okay.          Dx with essential (primary) hypertension; her current cardiac medication regimen includes a diuretic ( Hydrodiuril ), a beta-blocker ( Coreg ), a calcium channel blocker ( Norvasc ), and an angiotensin receptor blocker ( Cozaar ).  She did not bring her blood pressure diary, but says that pressures have been well controlled.  She is tolerating the medication well without side effects.  Compliance with treatment has been good; she takes her medication as directed and follows up as directed.            Mixed hyperlipidemia details; current treatment includes Lipitor.  Compliance with treatment has been good; she takes her medication as directed and follows up as directed.      ROS:     CONSTITUTIONAL:  Negative for chills and fever.      CARDIOVASCULAR:  Negative for chest pain and palpitations.      RESPIRATORY:  Negative for recent cough and dyspnea.      GASTROINTESTINAL:   Negative for abdominal pain, nausea and vomiting.      INTEGUMENTARY/BREAST:  Negative for atypical mole(s) and rash.          Past Medical History / Family History / Social History:         Last Reviewed on 3/23/2020 02:45 PM by Manoj Ybarra    Past Medical History:                 PAST MEDICAL HISTORY         Hyperlipidemia: Hypercholesterolemia;     Hypertension     Fibromyalgia         CURRENT MEDICAL PROVIDERS:    Cardiologist: Dr. Wilson    Ophthalmologist: Manish             ADVANCED DIRECTIVES: None - Her children would make decisions for her if needed.         PREVENTIVE HEALTH MAINTENANCE             BONE DENSITY: was last done 2/2017 with normal results     COLORECTAL CANCER SCREENING: Up to date (colonoscopy q10y; sigmoidoscopy q5y; Cologuard q3y) was last done 03/2017, Results are in chart; colonoscopy with the following abnormalities noted-- diverticulosis     EYE EXAM: was last done 5/16/17     MAMMOGRAM: Done within last 2 years and results in are chart was last done 12/31/19 with normal results     PAP SMEAR: was last done 4/2017 with normal results         Surgical History:         Cholecystectomy    Tubal Ligation  Uterine Ablation         Family History:         Positive for Hyperlipidemia ( mother ).      Positive for Breast Cancer ( sister ).      Positive for Type 2 Diabetes ( mother ).          Social History:     Occupation: Disabled (due to fibromyalgia)     Marital Status:      Children: 2 children         Tobacco/Alcohol/Supplements:     Last Reviewed on 8/05/2020 10:47 AM by Yandy Manriquez    Tobacco: She has a past history of cigarette smoking; quit date:  Quit Date 2013.          Alcohol: Frequency:    '4-5 a week';         Substance Abuse History:     Last Reviewed on 3/23/2020 02:45 PM by Manoj Ybarra    NEGATIVE         Mental Health History:     Last Reviewed on 3/23/2020 02:45 PM by Manoj Ybarra        Communicable Diseases (eg STDs):     Last  Reviewed on 3/23/2020 02:45 PM by Manoj Ybarra        Current Problems:     Last Reviewed on 3/23/2020 02:45 PM by Manoj Ybarra    Essential (primary) hypertension    Gastro-esophageal reflux disease without esophagitis    Mixed hyperlipidemia    Fibromyalgia    Other specified anxiety disorders    Other fatigue    Low back pain    Fever, unspecified    Spontaneous ecchymoses    Unspecified lump in the right breast, unspecified quadrant    Family history of malignant neoplasm of breast    Encounter for other screening for malignant neoplasm of breast        Immunizations:     zzFluzone pf-quadrivalent 3 and up 10/5/2015    Fluzone (3 + years dose) 11/6/2008    Fluzone (3 + years dose) 11/2/2009    Fluzone (3 + years dose) 12/8/2011    Fluzone (3 + years dose) 10/16/2012    Fluzone (3 + years dose) 9/30/2016    Fluzone (3 + years dose) 9/13/2017    Fluzone pf (3+ years dose) 11/1/2013    Fluzone pf (3+ years dose) 11/9/2018    Fluzone pf (3+ years dose) 10/22/2019    FluMist 12/11/2006        Allergies:     Last Reviewed on 8/05/2020 10:47 AM by Yandy Manriquez    ACE Inhibitors: cough  (Adverse Reaction)        Current Medications:     Last Reviewed on 8/05/2020 10:44 AM by Yandy Manriquez    aspirin 81 mg oral tablet, delayed release (enteric coated) [take 1 tablet (81 mg) by oral route once daily]    Calcium Citrate + D 315-200 mg-unit oral tablet [once daily]    omeprazole 40 mg oral capsule,delayed release (enteric coated) [take 1 capsule (40 mg) by oral route once daily before a meal]    hydroCHLOROthiazide 25 mg oral tablet [TAKE 1 TABLET EVERY DAY]    atorvastatin 20 mg oral tablet [TAKE 1 TABLET EVERY DAY  (NEED  APPOINTMENT)]    losartan 100 mg oral tablet [TAKE 1 TABLET EVERY DAY  (NEEDS  APPOINTMENT)]    traZODone 50 mg oral tablet [take 1 tablet (50 mg) by oral route once daily at bedtime]    gabapentin 300 mg oral capsule [take 1 capsule (300 mg) by oral route 3 times per day]     cyclobenzaprine 10 mg oral tablet [ 1 tablet TID prn]    carvediloL 6.25 mg oral tablet [Take 1 tablet(s) by mouth bid]    hydrOXYzine HCL 25 mg oral tablet [take 1 tablet (25 mg) by oral route 3 times per day prn]        Assessment:         M79.7   Fibromyalgia       M54.5   Low back pain       I10   Essential (primary) hypertension       E78.2   Mixed hyperlipidemia           ORDERS:         Meds Prescribed:       [Refilled] omeprazole 40 mg oral capsule,delayed release (enteric coated) [take 1 capsule (40 mg) by oral route once daily before a meal], #90 (ninety) capsules, Refills: 1 (one)       [Queued Refill] omeprazole 40 mg oral capsule,delayed release (enteric coated) [take 1 capsule (40 mg) by oral route once daily before a meal], #90 (ninety) capsules, Refills: 1 (one)       [Refilled] hydroCHLOROthiazide 25 mg oral tablet [TAKE 1 TABLET EVERY DAY], #90 (ninety) tablets, Refills: 1 (one)       [Refilled] losartan 100 mg oral tablet [take 1 tablet (100 mg) by oral route once daily], #90 (ninety) tablets, Refills: 1 (one)       [Refilled] atorvastatin 20 mg oral tablet [take 1 tablet (20 mg) by oral route once daily at bedtime], #90 (ninety) tablets, Refills: 1 (one)         Radiology/Test Orders:       [id number]  Colorectal CA screen results documented and reviewed (PV)  (In-House)              Lab Orders:       [id number]  CK - [facility]- CK total  (Send-Out)            00195  COMP - [facility] Comp. Metabolic Panel  (Send-Out)              Other Orders:         Screening mammogram results documented  (Send-Out)                      Plan:         Fibromyalgia        RECOMMENDATIONS given include: Today, we have reviewed her care.  We will refill needed medications for her and update labs.  Gabapentin will be filled for her presently.  She does see improvement.  No other changes are anticipated at this time..  MIPS Vaccines Flu and Pneumonia updated in Shot record Screening mammomgram done within last 2 years and results in  are chart Colorectal Cancer Screening is up to date and the results are in the chart Total time spent was 14 minutes; 46850--[place] E/M 11-20 minutes           Prescriptions:       [Refilled] omeprazole 40 mg oral capsule,delayed release (enteric coated) [take 1 capsule (40 mg) by oral route once daily before a meal], #90 (ninety) capsules, Refills: 1 (one)       [Queued Refill] omeprazole 40 mg oral capsule,delayed release (enteric coated) [take 1 capsule (40 mg) by oral route once daily before a meal], #90 (ninety) capsules, Refills: 1 (one)       [Refilled] hydroCHLOROthiazide 25 mg oral tablet [TAKE 1 TABLET EVERY DAY], #90 (ninety) tablets, Refills: 1 (one)       [Refilled] losartan 100 mg oral tablet [take 1 tablet (100 mg) by oral route once daily], #90 (ninety) tablets, Refills: 1 (one)       [Refilled] atorvastatin 20 mg oral tablet [take 1 tablet (20 mg) by oral route once daily at bedtime], #90 (ninety) tablets, Refills: 1 (one)           Orders:         Screening mammogram results documented  (Send-Out)            3017F  Colorectal CA screen results documented and reviewed (PV)  (In-House)              Low back painAs above.        Essential (primary) hypertensionAs above.        Mixed hyperlipidemiaAs above.    LABORATORY:  Labs ordered to be performed today include CK, total and Comprehensive metabolic panel.            Orders:       [id number]  CK - [facility]- CK total  (Send-Out)            25138  COMP - [facility] Comp. Metabolic Panel  (Send-Out)                  Charge Capture:         Primary Diagnosis:     M79.7  Fibromyalgia           Orders:      3017F  Colorectal CA screen results documented and reviewed (PV)  (In-House)            61100  Phys/QHP telephone evaluation 11-20 minutes  (In-House)              M54.5  Low back pain     I10  Essential (primary) hypertension     E78.2  Mixed hyperlipidemia

## 2021-05-18 NOTE — PROGRESS NOTES
Chely Calderón  1960     Office/Outpatient Visit    Visit Date: Wed, Aug 5, 2020 10:44 am    Provider: Joann Orellana N.P. (Assistant: Yandy Manriquez MA)    Location: [facility]        Electronically signed by Joann Orellana N.P. on  08/05/2020 11:33:06 AM                             Subjective:        CC: Ms. Calderón is a 59 year old Black or  female.  found lump in right breast yesterday morning; pt states she is not taking cyclobenzaprine, hydroxyzine        HPI:           Complaint of unspecified lump in the right breast, unspecified quadrant..  The symptom began yesterday.  notice a lump in her breast yesterday - it is nodular and has been sore since she noticed.  Denies any nipple discharge, any skin changes or any redness.  No trauma that she is aware.  She does have a family history of breast cancer in her sister.  She does report drinking caffeine of about 2 cups coffee per day     ROS:     CONSTITUTIONAL:  Negative for chills, fatigue, fever and unintentional weight loss.      CARDIOVASCULAR:  Negative for chest pain and pedal edema.      RESPIRATORY:  Negative for recent cough and dyspnea.      HEMATOLOGIC/LYMPHATIC:  Negative for lymphadenopathy.      INTEGUMENTARY/BREAST:  Positive for breast mass (right breast just above the nipple  midline).          Past Medical History / Family History / Social History:         Last Reviewed on 3/23/2020 02:45 PM by Manoj Ybarra    Past Medical History:                 PAST MEDICAL HISTORY         Hyperlipidemia: Hypercholesterolemia;     Hypertension     Fibromyalgia         CURRENT MEDICAL PROVIDERS:    Cardiologist: Dr. Wilson    Ophthalmologist: Manish             ADVANCED DIRECTIVES: None - Her children would make decisions for her if needed.         PREVENTIVE HEALTH MAINTENANCE             BONE DENSITY: was last done 2/2017 with normal results     COLORECTAL CANCER SCREENING: Up to date (colonoscopy q10y;  sigmoidoscopy q5y; Cologuard q3y) was last done 03/2017, Results are in chart; colonoscopy with the following abnormalities noted-- diverticulosis     EYE EXAM: was last done 5/16/17     MAMMOGRAM: Done within last 2 years and results in are chart was last done 12/31/19 with normal results     PAP SMEAR: was last done 4/2017 with normal results         Surgical History:         Cholecystectomy    Tubal Ligation  Uterine Ablation         Family History:         Positive for Hyperlipidemia ( mother ).      Positive for Breast Cancer ( sister ).      Positive for Type 2 Diabetes ( mother ).          Social History:     Occupation: Disabled (due to fibromyalgia)     Marital Status:      Children: 2 children         Tobacco/Alcohol/Supplements:     Last Reviewed on 3/23/2020 02:45 PM by Manoj Ybarra    Tobacco: She has a past history of cigarette smoking; quit date:  Quit Date 2013.          Alcohol: Frequency:    '4-5 a week';         Substance Abuse History:     Last Reviewed on 3/23/2020 02:45 PM by Manoj Ybarra    NEGATIVE         Mental Health History:     Last Reviewed on 3/23/2020 02:45 PM by Manoj Ybarra        Communicable Diseases (eg STDs):     Last Reviewed on 3/23/2020 02:45 PM by Manoj Ybarra        Current Problems:     Last Reviewed on 3/23/2020 02:45 PM by Manoj Ybarra    Essential (primary) hypertension    Gastro-esophageal reflux disease without esophagitis    Mixed hyperlipidemia    Fibromyalgia    Other specified anxiety disorders    Other fatigue    Low back pain    Fever, unspecified    Spontaneous ecchymoses        Immunizations:     zzFluzone pf-quadrivalent 3 and up 10/5/2015    Fluzone (3 + years dose) 11/6/2008    Fluzone (3 + years dose) 11/2/2009    Fluzone (3 + years dose) 12/8/2011    Fluzone (3 + years dose) 10/16/2012    Fluzone (3 + years dose) 9/30/2016    Fluzone (3 + years dose) 9/13/2017    Fluzone pf (3+ years dose)  11/1/2013    Fluzone pf (3+ years dose) 11/9/2018    Fluzone pf (3+ years dose) 10/22/2019    FluMist 12/11/2006        Allergies:     Last Reviewed on 8/05/2020 10:47 AM by Yandy Manriquez    ACE Inhibitors: cough  (Adverse Reaction)        Current Medications:     Last Reviewed on 8/05/2020 10:44 AM by Yandy Manriquez    aspirin 81 mg oral tablet, delayed release (enteric coated) [take 1 tablet (81 mg) by oral route once daily]    Calcium Citrate + D 315-200 mg-unit oral tablet [once daily]    omeprazole 40 mg oral capsule,delayed release (enteric coated) [take 1 capsule (40 mg) by oral route once daily before a meal]    hydroCHLOROthiazide 25 mg oral tablet [take 1 tablet (25 mg) by oral route once daily]    atorvastatin 20 mg oral tablet [TAKE 1 TABLET EVERY DAY]    Losartan 100 mg oral tablet [Take 1 tablet(s) by mouth daily]    traZODone 50 mg oral tablet [take 1 tablet (50 mg) by oral route once daily at bedtime]    gabapentin 300 mg oral capsule [take 1 capsule (300 mg) by oral route 3 times per day]    cyclobenzaprine 10 mg oral tablet [ 1 tablet TID prn]    carvediloL 6.25 mg oral tablet [Take 1 tablet(s) by mouth bid]    hydrOXYzine HCL 25 mg oral tablet [take 1 tablet (25 mg) by oral route 3 times per day prn]        Objective:        Vitals:         Current: 8/5/2020 10:59:36 AM    Ht:  5 ft, 4 in;  Wt: 198.4 lbs;  BMI: 34.1T: 97.5 F (temporal);  BP: 128/90 mm Hg (right arm, sitting);  P: 63 bpm (finger clip, sitting);  sCr: 0.88 mg/dL;  GFR: 78.82        Exams:     PHYSICAL EXAM:     GENERAL: vital signs recorded - well developed, well nourished;  no apparent distress;     RESPIRATORY: normal appearance and symmetric expansion of chest wall; normal respiratory rate and pattern with no distress; normal breath sounds with no rales, rhonchi, wheezes or rubs;     CARDIOVASCULAR: normal rate; rhythm is regular;  no edema;     LYMPHATIC: no enlargement of cervical or facial nodes; no supraclavicular nodes; no  axillary adenopathy;     BREAST/INTEGUMENT: (+) breast mass: tender, freely mobile, firm, linear, areas of distinct and indistinct borders; it is 4 cm in size, and is located superior to right nipple;     MUSCULOSKELETAL: normal gait; normal range of motion of all major muscle groups; no limb or joint pain with range of motion;     NEUROLOGIC: mental status: alert and oriented x 3;     PSYCHIATRIC: appropriate affect and demeanor; normal speech pattern; normal thought and perception;         Assessment:         N63.10   Unspecified lump in the right breast, unspecified quadrant       Z80.3   Family history of malignant neoplasm of breast       F41.8   Other specified anxiety disorders           ORDERS:         Radiology/Test Orders:       [id number]  Right Diagnostic Mammography  (Send-Out)              Procedures Ordered:       [id number]  Right Breast USG  (Send-Out)                      Plan:         Unspecified lump in the right breast, unspecified quadrantwill get diagnostic mammogram with possible US given the family history         FOLLOW-UP TESTING #1:    RADIOLOGY:  I have ordered Breast Ultrasound Rt. Breast Ultrasound and a diagnostic mammogram on the right breast to be done today.            Orders:       [id number]  Right Breast USG  (Send-Out)            [id number]  Right Diagnostic Mammography  (Send-Out)              Other specified anxiety disordersShe is inquiring about possible medication adjustments in her trazodone for her anxiety.  The hydroxyzine was not effective.  I will forward to PCP and have him decide what he prefers for management and have her follow up with him             Charge Capture:         Primary Diagnosis:     N63.10  Unspecified lump in the right breast, unspecified quadrant           Orders:      [id number]  Office/outpatient visit; established patient, level 3  (In-House)              Z80.3  Family history of malignant neoplasm of breast     F41.8  Other specified anxiety disorders

## 2021-05-18 NOTE — PROGRESS NOTES
Chely Calderón  1960     Office/Outpatient Visit    Visit Date: Mon, Mar 22, 2021 01:36 pm    Provider: Manoj Ybarra MD (Assistant: Cecilia Richmond RN)    Location: [facility]        Electronically signed by Manoj Ybarra MD on  03/24/2021 08:49:16 AM                             Subjective:        CC: burning with urination    HPI:       Chely is in today for evaluation of abdominal pain and some burning with urination.  She has had symptoms for the last 5 days or so.  She says that her urine is not right.  She has a lot of low back pain and 'full body pain' that it is difficult to say if there is a change.  She denies any vaginal discharge of which she is aware.  She denies any change in sexual partners.  Most of the discomfort is in the L lower abdomen and R upper abdomen.  She is not having fever or chills.  She has noted some dizziness - a light-headed feeling.          In regard to the essential (primary) hypertension, her current cardiac medication regimen includes a diuretic ( Hydrodiuril ), a beta-blocker ( Coreg ), a calcium channel blocker ( Norvasc ), and an angiotensin receptor blocker ( Cozaar ).  She did not bring her blood pressure diary, but says that pressures have been well controlled.  She is tolerating the medication well without side effects.  Compliance with treatment has been good; she takes her medication as directed and follows up as directed.            With regard to the mixed hyperlipidemia, current treatment includes Lipitor.  Compliance with treatment has been good; she takes her medication as directed and follows up as directed.      ROS:     CONSTITUTIONAL:  Negative for chills and fever.      CARDIOVASCULAR:  Negative for chest pain and palpitations.      RESPIRATORY:  Negative for recent cough and dyspnea.      INTEGUMENTARY/BREAST:  Negative for atypical mole(s) and rash.          Past Medical History / Family History / Social History:          Last Reviewed on 3/23/2020 02:45 PM by Manoj Ybarra    Past Medical History:                 PAST MEDICAL HISTORY         Hyperlipidemia: Hypercholesterolemia;     Hypertension     Fibromyalgia         CURRENT MEDICAL PROVIDERS:    Cardiologist: Dr. Wilson    Ophthalmologist: Manish             ADVANCED DIRECTIVES: None - Her children would make decisions for her if needed.         PREVENTIVE HEALTH MAINTENANCE             BONE DENSITY: was last done 2/2017 with normal results     COLORECTAL CANCER SCREENING: Up to date (colonoscopy q10y; sigmoidoscopy q5y; Cologuard q3y) was last done 03/2017, Results are in chart; colonoscopy with the following abnormalities noted-- diverticulosis     EYE EXAM: was last done 5/16/17     MAMMOGRAM: Done within last 2 years and results in are chart was last done 12/31/19 with normal results     PAP SMEAR: was last done 4/2017 with normal results         Surgical History:         Cholecystectomy    Tubal Ligation  Uterine Ablation         Family History:         Positive for Hyperlipidemia ( mother ).      Positive for Breast Cancer ( sister ).      Positive for Type 2 Diabetes ( mother ).          Social History:     Occupation: Disabled (due to fibromyalgia)     Marital Status:      Children: 2 children - one son passed away 2020         Tobacco/Alcohol/Supplements:     Last Reviewed on 12/22/2020 12:50 PM by Elena Morin    Tobacco: She has a past history of cigarette smoking; quit date:  Quit Date 2013.          Alcohol: Frequency:    '4-5 a week';         Substance Abuse History:     Last Reviewed on 3/23/2020 02:45 PM by Manoj Ybarra    NEGATIVE         Mental Health History:     Last Reviewed on 3/23/2020 02:45 PM by Manoj Ybarra        Communicable Diseases (eg STDs):     Last Reviewed on 3/23/2020 02:45 PM by Manoj Ybarra        Current Problems:     Last Reviewed on 3/23/2020 02:45 PM by Manoj Ybarra     Essential (primary) hypertension    Gastro-esophageal reflux disease without esophagitis    Mixed hyperlipidemia    Fibromyalgia    Other specified anxiety disorders    Other fatigue    Low back pain    Fever, unspecified    Spontaneous ecchymoses    Unspecified lump in the right breast, unspecified quadrant    Family history of malignant neoplasm of breast    Encounter for other screening for malignant neoplasm of breast        Immunizations:     influenza, injectable, quadrivalent, preservative free 10/22/2020    zzFluzone pf-quadrivalent 3 and up 10/5/2015    Fluzone (3 + years dose) 11/6/2008    Fluzone (3 + years dose) 11/2/2009    Fluzone (3 + years dose) 12/8/2011    Fluzone (3 + years dose) 10/16/2012    Fluzone (3 + years dose) 9/30/2016    Fluzone (3 + years dose) 9/13/2017    Fluzone pf (3+ years dose) 11/1/2013    Fluzone pf (3+ years dose) 11/9/2018    Fluzone pf (3+ years dose) 10/22/2019    FluMist 12/11/2006        Allergies:     Last Reviewed on 12/22/2020 12:49 PM by Elena Morin    ACE Inhibitors: cough  (Adverse Reaction)        Current Medications:     Last Reviewed on 12/22/2020 12:49 PM by Elena Morin    aspirin 81 mg oral tablet, delayed release (enteric coated) [take 1 tablet (81 mg) by oral route once daily]    Calcium Citrate + D 315-200 mg-unit oral tablet [once daily]    omeprazole 40 mg oral capsule,delayed release (enteric coated) [take 1 capsule (40 mg) by oral route once daily before a meal]    hydroCHLOROthiazide 25 mg oral tablet [TAKE 1 TABLET EVERY DAY]    atorvastatin 20 mg oral tablet [take 1 tablet (20 mg) by oral route once daily at bedtime]    losartan 100 mg oral tablet [take 1 tablet (100 mg) by oral route once daily]    traZODone 50 mg oral tablet [take 1 tablet (50 mg) by oral route once daily at bedtime]    gabapentin 300 mg oral capsule [take 1 capsule (300 mg) by oral route 3 times per day]    carvediloL 6.25 mg oral tablet [Take 1 tablet(s) by mouth bid]     amLODIPine 5 mg oral tablet        Objective:        Vitals:         Current: 3/22/2021 1:42:22 PM    Ht:  5 ft, 4 in;  Wt: 196 lbs;  BMI: 33.6T: 97.1 F (temporal);  BP: 164/85 mm Hg (right arm, sitting);  P: 67 bpm (right arm (BP Cuff), sitting);  sCr: 0.8 mg/dL;  GFR: 85.25        Repeat:     2:23:32 PM  BP:   181/83mm Hg (right arm, sitting, p-73)     Exams:     PHYSICAL EXAM:     GENERAL: vital signs recorded - well developed, well nourished;  no apparent distress;     E/N/T: EARS:  normal external auditory canals and tympanic membranes;  grossly normal hearing;     NECK: range of motion is normal; thyroid is non-palpable;     RESPIRATORY: normal respiratory rate and pattern with no distress; normal breath sounds with no rales, rhonchi, wheezes or rubs;     CARDIOVASCULAR: normal rate; rhythm is regular;  no systolic murmur; no edema;     GASTROINTESTINAL: nontender; normal bowel sounds; no organomegaly;     LYMPHATIC: no enlargement of cervical or facial nodes; no supraclavicular nodes;     BREAST/INTEGUMENT: SKIN: no significant rashes or lesions; no suspicious moles;     NEUROLOGIC: mental status: alert and oriented x 3; cranial nerves II-XII grossly intact;     PSYCHIATRIC: appropriate affect and demeanor; normal psychomotor function;         Assessment:         R30.0   Dysuria       I10   Essential (primary) hypertension       E78.2   Mixed hyperlipidemia       R10.9   Unspecified abdominal pain       M79.7   Fibromyalgia           ORDERS:         Meds Prescribed:       [Refilled] gabapentin 300 mg oral capsule [take 1 capsule (300 mg) by oral route 3 times per day], #270 (two hundred and seventy) capsules, Refills: 1 (one)         Radiology/Test Orders:       [id number]  Radiologic examination, abdomen; complete acute abdomen series  (Send-Out)              Lab Orders:       [id number]  BDUAM - [facility] Urinalysis, automated, with micro  (Send-Out)            [id number]  URCU - [facility] Urine Culture  (Send-Out)            [id number]   BDCB2 - [facility] CBC w/o diff  (Send-Out)            [id number]  HTNLP - [facility] CMP AND LIPID: [id number]  (Send-Out)            [id number]  TSH - H TSH  (Send-Out)            [id number]  CK - [facility]- CK total  (Send-Out)                      Plan:         Dysuria    LABORATORY:  Labs ordered to be performed today include Urine culture.      RECOMMENDATIONS given include: Today, we have reviewed Chely's care.  She has not been feeling well recently.  I'm going to recommend moving ahead with evaluation as noted below.  We will see what the testing shows and be back in touch with her..            Prescriptions:       [Refilled] gabapentin 300 mg oral capsule [take 1 capsule (300 mg) by oral route 3 times per day], #270 (two hundred and seventy) capsules, Refills: 1 (one)           Orders:       [id number]  BDUAM - [facility] Urinalysis, automated, with micro  (Send-Out)            [id number]  URCU - [facility] Urine Culture  (Send-Out)              Essential (primary) hypertensionAs above.        Mixed hyperlipidemiaAs above.    LABORATORY:  Labs ordered to be performed today include CK, total, HTN/Lipid Panel: CMP, Lipid, and TSH.            Orders:       [id number]  HTNLP - [facility] CMP AND LIPID: [id number], [id number]  (Send-Out)            [id number]  TSH - H TSH  (Send-Out)            [id number]  CK - [facility]- CK total  (Send-Out)              Unspecified abdominal pain    LABORATORY:  Labs ordered to be performed today include CBC W/O DIFF.      RADIOLOGY:  I have ordered an acute abdominal series to be done today.            Orders:       [id number]  BDCB2 - [facility] CBC w/o diff  (Send-Out)            [id number]  Radiologic examination, abdomen; complete acute abdomen series  (Send-Out)              FibromyalgiaAs above.            Charge Capture:         Primary Diagnosis:     R30.0  Dysuria           Orders:      [id number]  Office/outpatient visit; established patient, level 4  (In-House)              I10  Essential (primary) hypertension     E78.2  Mixed hyperlipidemia     R10.9  Unspecified  abdominal pain     M79.7  Fibromyalgia

## 2021-05-18 NOTE — PROGRESS NOTES
Chely Calderón 1960     Office/Outpatient Visit    Visit Date: Thu, Mar 29, 2018 03:37 pm    Provider: Lauren Alford N.P. (Assistant: France Bellamy MA)    Location: [facility]        Electronically signed by Lauren Alford N.P. on  03/29/2018 05:38:54 PM                             SUBJECTIVE:        CC:     Ms. Calderón is a 57 year old Black or  female.  Abdominal pains;         HPI:         Abdominal pain, other specified site noted.  This is located primarily in the left upper quadrant and left lower quadrant.  It began yesterday.  Associated symptoms include diarrhea, nausea and pelvic pressure.  She denies vomiting.  Pertinent medical history includes interstitial cystitis, irritable bowel syndrome, and diverticulosis.      ROS:     CONSTITUTIONAL:  Negative for chills and fever.      CARDIOVASCULAR:  Negative for chest pain and palpitations.      RESPIRATORY:  Negative for dyspnea and frequent wheezing.      GASTROINTESTINAL:  Positive for abdominal pain, diarrhea and nausea.   Negative for vomiting.      GENITOURINARY:  Negative for dysuria and frequent urination.          [facility]/:     Last Reviewed on 10/04/2017 02:22 PM by Jonna Russell    Past Medical History:                 PAST MEDICAL HISTORY         Hyperlipidemia: Hypercholesterolemia;     Hypertension     Fibromyalgia         CURRENT MEDICAL PROVIDERS:    [facility]    Ophthalmologist: [facility]             BONE DENSITY: was last done 2/2017 with normal results     COLORECTAL CANCER SCREENING: colonoscopy with the following abnormalities noted-- diverticulosis     EYE EXAM: was last done 5/16/17     MAMMOGRAM: was last done 2/2017 with normal results     PAP SMEAR: was last done 4/2017 with normal results         Surgical History:         Cholecystectomy    Tubal Ligation   Uterine Ablation         Family History:         Positive for  [place] [facility]  [address] [address]    Coleen Brooks : 1976 MRN: [id number]    4/15/[facility] PSY[facility]             Hyperlipidemia ( mother ).      Positive for Breast Cancer ( sister ).      Positive for Type 2 Diabetes ( mother ).          Social History:     Occupation: Disabled (due to fibromyalgia)     Marital Status:      Children: 2 children         Tobacco/Alcohol/Supplements:     Last Reviewed on 11/29/2017 09:05 AM by France Bellamy    Tobacco: She has a past history of cigarette smoking; quit date:  Quit Date 2013.          Alcohol: Frequency:    '4-5 a week';         Substance Abuse History:     Last Reviewed on 10/04/2017 02:22 PM by Jonna Russell    NEGATIVE         Mental Health History:     Last Reviewed on 10/04/2017 02:22 PM by Jonna Russell        Communicable Diseases (eg STDs):     Last Reviewed on 10/04/2017 02:22 PM by Jonna Russell            Current Problems:     Last Reviewed on 10/04/2017 02:22 PM by Jonna Russell    Fever, unspecified     Low back pain     Fatigue     Systolic murmur     Episodic cluster headache     Chronic low back pain     HTN     Hypercholesterolemia     Fibromyalgia     Anxiety     Hearing loss     PMDD     Benign skin neoplasm, of scalp and neck     GERD     Fibrocystic disease of breasts     Polycystic ovarian disease     Benign essential tremor         Immunizations:     zzFluzone pf-quadrivalent 3 and up 10/5/2015     Fluzone (3 + years dose) 11/6/2008     Fluzone (3 + years dose) 11/2/2009     Fluzone (3 + years dose) 12/8/2011     Fluzone (3 + years dose) 10/16/2012     Fluzone (3 + years dose) 9/30/2016     Fluzone (3 + years dose) 9/13/2017     Fluzone pf (3+ years dose) 11/1/2013     FluMist 12/11/2006         Allergies:     Last Reviewed on 1/02/2018 06:15 PM by Jessica Rios      No Known Drug Allergies.     ACE Inhibitors: cough (Adverse Reaction)        Current Medications:     Last Reviewed on 10/04/2017 02:22 PM by Jonna Russell    Hydrochlorothiazide (HCTZ) 12.5mg Capsules Take 1 capsule(s) by mouth daily     Lipitor 20mg  Tablet 1 tab daily     Losartan 50mg Tablet 1 tab daily     Metoprolol Succinate 25mg Tablets, Extended Release Take 1 tablet(s) bid     Omeprazole 40mg Capsules, Extended Release 1 capsule daily     Trazodone HCl 50mg Tablet 1 tab hs/prn         OBJECTIVE:        Vitals:         Current: 3/29/2018 3:41:37 PM    Ht:  5 ft, 4 in;  Wt: 206 lbs;  BMI: 35.4    T: 97.7 F (oral);  BP: 148/88 mm Hg (left arm, sitting);  P: 73 bpm (left arm (BP Cuff), sitting);  sCr: 0.93 mg/dL;  GFR: 77.56        Exams:     PHYSICAL EXAM:     GENERAL: well developed, well nourished;  no apparent distress;     RESPIRATORY: normal respiratory rate and pattern with no distress; normal breath sounds with no rales, rhonchi, wheezes or rubs;     CARDIOVASCULAR: normal rate; rhythm is regular;     GASTROINTESTINAL: nontender; normal bowel sounds; no organomegaly;     MUSCULOSKELETAL: normal gait; normal range of motion of all major muscle groups; no limb or joint pain with range of motion;     NEUROLOGIC: mental status: alert and oriented x 3; GROSSLY INTACT     PSYCHIATRIC: appropriate affect and demeanor;         Lab/Test Results:             Glucose, Urine:  Neg (03/29/2018),     Bilirubin, urine:  Negative (03/29/2018),     Ketones, Urine Strip:  Negative (03/29/2018),     Specific Gravity, urine:  1.025 (03/29/2018),     Blood in Urine:  negative (03/29/2018),     pH, urine:  5.5 (03/29/2018),     Protein Urine QL:  negative (03/29/2018),     Urobilinogen, urine:  0.2 E.U./dL (03/29/2018),     Nitrite, Urine:  Negative (03/29/2018),     Leukoctyes, urine:  Negative (03/29/2018),     Appearance:  Clear (03/29/2018),     collection source:  Clean-catch (03/29/2018),     Color:  Dark Yellow (03/29/2018),     Performed by::  [facility] (03/29/2018),             ASSESSMENT           789.09   R10.84  Abdominal pain, other specified site              DDx:         ORDERS:         Meds Prescribed:       Promethazine HCl 25mg Tablet 1/2 - 1 tab q 4-6 hrs prn    prn nausea and vomiting  #20 (Twenty) tablet(s) Refills: 0         Lab Orders:       [id number]  Urinalysis, automated, without microscopy  (In-House)         99546  BDCB2 - [facility] CBC w/o diff  (Send-Out)         61952  COMP - [facility] Comp. Metabolic Panel  (Send-Out)         01870  HPUBT - [facility] H.pylori Breath test  (Send-Out)         23628  AMYS - [facility] Amylase, Serum  (Send-Out)         73607  LIP - [facility] Lipase, Serum  (Send-Out)                   PLAN:          Abdominal pain, other specified site No acute findings on exam today. Urine results discussed with patient. Await labs. Advised to seek ER care immediately for worsening symptoms such as worsening abdominal pain, coffee ground emesis, black tarry stools.     LABORATORY:  Labs ordered to be performed today include amylase, CBC W/O DIFF, Comprehensive metabolic panel, H. pylori breath test, and Lipase.      RECOMMENDATIONS given include: Further recommendation to be given after test results are complete.      FOLLOW-UP: Schedule follow-up appointments on a p.r.n. basis. Chronic visit follow up           Prescriptions:       Promethazine HCl 25mg Tablet 1/2 - 1 tab q 4-6 hrs prn   prn nausea and vomiting  #20 (Twenty) tablet(s) Refills: 0           Orders:       [id number]  Urinalysis, automated, without microscopy  (In-House)         25288  BDCB2 - [facility] CBC w/o diff  (Send-Out)         27336  COMP - [facility] Comp. Metabolic Panel  (Send-Out)         71068  HPUBT - [facility] H.pylori Breath test  (Send-Out)         36443  AMYS - [facility] Amylase, Serum  (Send-Out)         93982  LIP - [facility] Lipase, Serum  (Send-Out)               Patient Recommendations:        For  Abdominal pain, other specified site:     Schedule follow-up appointments as needed.              CHARGE CAPTURE           **Please note: ICD descriptions below are intended for billing purposes only and may not represent clinical diagnoses**        Primary Diagnosis:         789.09 Abdominal pain, other specified site             R10.84    Generalized abdominal pain              Orders:          75876   Office/outpatient visit; established patient, level 3  (In-House)             56414   Urinalysis, automated, without microscopy  (In-House)

## 2021-07-01 VITALS
TEMPERATURE: 102.2 F | BODY MASS INDEX: 34.52 KG/M2 | SYSTOLIC BLOOD PRESSURE: 161 MMHG | HEIGHT: 64 IN | WEIGHT: 202.2 LBS | HEART RATE: 113 BPM | DIASTOLIC BLOOD PRESSURE: 92 MMHG

## 2021-07-01 VITALS
SYSTOLIC BLOOD PRESSURE: 112 MMHG | DIASTOLIC BLOOD PRESSURE: 56 MMHG | BODY MASS INDEX: 34.97 KG/M2 | HEIGHT: 64 IN | HEART RATE: 76 BPM | WEIGHT: 204.8 LBS | TEMPERATURE: 98.1 F

## 2021-07-01 VITALS
DIASTOLIC BLOOD PRESSURE: 88 MMHG | TEMPERATURE: 97.7 F | SYSTOLIC BLOOD PRESSURE: 148 MMHG | HEIGHT: 64 IN | WEIGHT: 206 LBS | HEART RATE: 73 BPM | BODY MASS INDEX: 35.17 KG/M2

## 2021-07-01 VITALS
SYSTOLIC BLOOD PRESSURE: 133 MMHG | BODY MASS INDEX: 34.52 KG/M2 | TEMPERATURE: 97.7 F | HEIGHT: 64 IN | HEART RATE: 66 BPM | DIASTOLIC BLOOD PRESSURE: 74 MMHG | WEIGHT: 202.2 LBS

## 2021-07-01 VITALS
TEMPERATURE: 97.2 F | BODY MASS INDEX: 35.27 KG/M2 | WEIGHT: 206.6 LBS | SYSTOLIC BLOOD PRESSURE: 168 MMHG | HEIGHT: 64 IN | HEART RATE: 68 BPM | DIASTOLIC BLOOD PRESSURE: 79 MMHG

## 2021-07-01 VITALS
HEIGHT: 64 IN | WEIGHT: 197 LBS | DIASTOLIC BLOOD PRESSURE: 94 MMHG | BODY MASS INDEX: 33.63 KG/M2 | SYSTOLIC BLOOD PRESSURE: 178 MMHG | TEMPERATURE: 97.1 F | HEART RATE: 79 BPM

## 2021-07-02 ENCOUNTER — LAB (OUTPATIENT)
Dept: LAB | Facility: HOSPITAL | Age: 61
End: 2021-07-02

## 2021-07-02 ENCOUNTER — OFFICE VISIT (OUTPATIENT)
Dept: FAMILY MEDICINE CLINIC | Age: 61
End: 2021-07-02

## 2021-07-02 VITALS
BODY MASS INDEX: 33.87 KG/M2 | DIASTOLIC BLOOD PRESSURE: 90 MMHG | SYSTOLIC BLOOD PRESSURE: 128 MMHG | TEMPERATURE: 97.5 F | WEIGHT: 198.4 LBS | HEIGHT: 64 IN | HEART RATE: 63 BPM

## 2021-07-02 VITALS
TEMPERATURE: 98.2 F | DIASTOLIC BLOOD PRESSURE: 80 MMHG | SYSTOLIC BLOOD PRESSURE: 134 MMHG | HEIGHT: 64 IN | BODY MASS INDEX: 33.36 KG/M2 | HEART RATE: 73 BPM | WEIGHT: 195.4 LBS

## 2021-07-02 VITALS
HEART RATE: 67 BPM | BODY MASS INDEX: 33.46 KG/M2 | WEIGHT: 196 LBS | DIASTOLIC BLOOD PRESSURE: 83 MMHG | SYSTOLIC BLOOD PRESSURE: 181 MMHG | TEMPERATURE: 97.1 F | HEIGHT: 64 IN

## 2021-07-02 DIAGNOSIS — L65.9 HAIR LOSS: Primary | ICD-10-CM

## 2021-07-02 DIAGNOSIS — R22.33 MASS OF AXILLA, BILATERAL: ICD-10-CM

## 2021-07-02 DIAGNOSIS — M62.830 BACK SPASM: ICD-10-CM

## 2021-07-02 DIAGNOSIS — L65.9 HAIR LOSS: ICD-10-CM

## 2021-07-02 DIAGNOSIS — N17.9 ACUTE KIDNEY INJURY (HCC): ICD-10-CM

## 2021-07-02 LAB
ALBUMIN SERPL-MCNC: 4.9 G/DL (ref 3.5–5.2)
ALBUMIN/GLOB SERPL: 2 G/DL
ALP SERPL-CCNC: 63 U/L (ref 39–117)
ALT SERPL W P-5'-P-CCNC: 17 U/L (ref 1–33)
ANION GAP SERPL CALCULATED.3IONS-SCNC: 12.2 MMOL/L (ref 5–15)
AST SERPL-CCNC: 21 U/L (ref 1–32)
BASOPHILS # BLD AUTO: 0.04 10*3/MM3 (ref 0–0.2)
BASOPHILS NFR BLD AUTO: 0.6 % (ref 0–1.5)
BILIRUB SERPL-MCNC: 0.6 MG/DL (ref 0–1.2)
BUN SERPL-MCNC: 16 MG/DL (ref 6–20)
BUN/CREAT SERPL: 11.7 (ref 7–25)
CALCIUM SPEC-SCNC: 9.5 MG/DL (ref 8.6–10.5)
CHLORIDE SERPL-SCNC: 103 MMOL/L (ref 98–107)
CO2 SERPL-SCNC: 26.8 MMOL/L (ref 22–29)
CREAT SERPL-MCNC: 1.37 MG/DL (ref 0.57–1)
DEPRECATED RDW RBC AUTO: 44.5 FL (ref 37–54)
EOSINOPHIL # BLD AUTO: 0.09 10*3/MM3 (ref 0–0.4)
EOSINOPHIL NFR BLD AUTO: 1.3 % (ref 0.3–6.2)
ERYTHROCYTE [DISTWIDTH] IN BLOOD BY AUTOMATED COUNT: 13.9 % (ref 12.3–15.4)
GFR SERPL CREATININE-BSD FRML MDRD: 48 ML/MIN/1.73
GLOBULIN UR ELPH-MCNC: 2.4 GM/DL
GLUCOSE SERPL-MCNC: 82 MG/DL (ref 65–99)
HCT VFR BLD AUTO: 40.6 % (ref 34–46.6)
HGB BLD-MCNC: 13.2 G/DL (ref 12–15.9)
IMM GRANULOCYTES # BLD AUTO: 0.01 10*3/MM3 (ref 0–0.05)
IMM GRANULOCYTES NFR BLD AUTO: 0.1 % (ref 0–0.5)
LYMPHOCYTES # BLD AUTO: 2.36 10*3/MM3 (ref 0.7–3.1)
LYMPHOCYTES NFR BLD AUTO: 33.5 % (ref 19.6–45.3)
MCH RBC QN AUTO: 28.2 PG (ref 26.6–33)
MCHC RBC AUTO-ENTMCNC: 32.5 G/DL (ref 31.5–35.7)
MCV RBC AUTO: 86.8 FL (ref 79–97)
MONOCYTES # BLD AUTO: 0.39 10*3/MM3 (ref 0.1–0.9)
MONOCYTES NFR BLD AUTO: 5.5 % (ref 5–12)
NEUTROPHILS NFR BLD AUTO: 4.15 10*3/MM3 (ref 1.7–7)
NEUTROPHILS NFR BLD AUTO: 59 % (ref 42.7–76)
PLATELET # BLD AUTO: 330 10*3/MM3 (ref 140–450)
PMV BLD AUTO: 10.4 FL (ref 6–12)
POTASSIUM SERPL-SCNC: 4.1 MMOL/L (ref 3.5–5.2)
PROT SERPL-MCNC: 7.3 G/DL (ref 6–8.5)
RBC # BLD AUTO: 4.68 10*6/MM3 (ref 3.77–5.28)
SODIUM SERPL-SCNC: 142 MMOL/L (ref 136–145)
TSH SERPL DL<=0.05 MIU/L-ACNC: 1.85 UIU/ML (ref 0.27–4.2)
VIT B12 BLD-MCNC: 426 PG/ML (ref 211–946)
WBC # BLD AUTO: 7.04 10*3/MM3 (ref 3.4–10.8)

## 2021-07-02 PROCEDURE — 36415 COLL VENOUS BLD VENIPUNCTURE: CPT

## 2021-07-02 PROCEDURE — 85025 COMPLETE CBC W/AUTO DIFF WBC: CPT

## 2021-07-02 PROCEDURE — 84443 ASSAY THYROID STIM HORMONE: CPT

## 2021-07-02 PROCEDURE — 80053 COMPREHEN METABOLIC PANEL: CPT

## 2021-07-02 PROCEDURE — 99214 OFFICE O/P EST MOD 30 MIN: CPT | Performed by: NURSE PRACTITIONER

## 2021-07-02 PROCEDURE — 96372 THER/PROPH/DIAG INJ SC/IM: CPT | Performed by: NURSE PRACTITIONER

## 2021-07-02 PROCEDURE — 82607 VITAMIN B-12: CPT

## 2021-07-02 RX ORDER — SUCRALFATE 1 G/1
1 TABLET ORAL DAILY
COMMUNITY
End: 2021-08-26 | Stop reason: SDUPTHER

## 2021-07-02 RX ORDER — TRAZODONE HYDROCHLORIDE 50 MG/1
1 TABLET ORAL AS NEEDED
COMMUNITY
End: 2022-09-19

## 2021-07-02 RX ORDER — HYDROCHLOROTHIAZIDE 25 MG/1
1 TABLET ORAL DAILY
COMMUNITY
Start: 2021-05-09 | End: 2021-08-26 | Stop reason: SDUPTHER

## 2021-07-02 RX ORDER — KETOROLAC TROMETHAMINE 30 MG/ML
30 INJECTION, SOLUTION INTRAMUSCULAR; INTRAVENOUS ONCE
Status: COMPLETED | OUTPATIENT
Start: 2021-07-02 | End: 2021-07-02

## 2021-07-02 RX ORDER — TRIAMCINOLONE ACETONIDE 40 MG/ML
40 INJECTION, SUSPENSION INTRA-ARTICULAR; INTRAMUSCULAR ONCE
Status: COMPLETED | OUTPATIENT
Start: 2021-07-02 | End: 2021-07-02

## 2021-07-02 RX ORDER — GABAPENTIN 300 MG/1
1 CAPSULE ORAL 3 TIMES DAILY
COMMUNITY
End: 2021-08-26 | Stop reason: SDUPTHER

## 2021-07-02 RX ORDER — CARVEDILOL 3.12 MG/1
1 TABLET ORAL 2 TIMES DAILY
COMMUNITY
End: 2021-08-26 | Stop reason: SDUPTHER

## 2021-07-02 RX ORDER — TIZANIDINE 4 MG/1
4 TABLET ORAL EVERY 6 HOURS PRN
Qty: 30 TABLET | Refills: 1 | Status: SHIPPED | OUTPATIENT
Start: 2021-07-02 | End: 2022-09-19

## 2021-07-02 RX ORDER — ATORVASTATIN CALCIUM 20 MG/1
1 TABLET, FILM COATED ORAL DAILY
COMMUNITY
End: 2021-08-26 | Stop reason: SDUPTHER

## 2021-07-02 RX ORDER — OMEPRAZOLE 40 MG/1
1 CAPSULE, DELAYED RELEASE ORAL DAILY
COMMUNITY
Start: 2021-05-09 | End: 2021-08-26 | Stop reason: SDUPTHER

## 2021-07-02 RX ORDER — AMLODIPINE BESYLATE 5 MG/1
1 TABLET ORAL DAILY
COMMUNITY
Start: 2020-12-17 | End: 2021-08-26 | Stop reason: SDUPTHER

## 2021-07-02 RX ORDER — LOSARTAN POTASSIUM 100 MG/1
1 TABLET ORAL DAILY
COMMUNITY
End: 2021-08-26 | Stop reason: SDUPTHER

## 2021-07-02 RX ADMIN — TRIAMCINOLONE ACETONIDE 40 MG: 40 INJECTION, SUSPENSION INTRA-ARTICULAR; INTRAMUSCULAR at 11:32

## 2021-07-02 RX ADMIN — KETOROLAC TROMETHAMINE 30 MG: 30 INJECTION, SOLUTION INTRAMUSCULAR; INTRAVENOUS at 11:31

## 2021-07-02 NOTE — PROGRESS NOTES
"Chief Complaint  Mass (complaints of lump around L underarm area that she noticed last week) and Alopecia (X 2 weeks)    Subjective          Chely Calderón presents to [facility] to discuss mass under left arm.  Patient just had mammogram in February of this year and it was normal.  Sister has history of breast cancer.  She also has noticed a small area on the back of her scalp that demonstrates hair loss.  Patient had labs in March of this year and looked okay.  She has been under a lot of stress since her son was killed last year.  Mother also has history of alopecia.  upon exam patient started having muscle spasms in her back.  Patient states that she just had injections earlier this week.  Patient had to return to her room after labs due to the pain.        Objective   Vital Signs:   /80 (BP Location: Left arm, Patient Position: Sitting)   Pulse 73   Temp 98.2 °F (36.8 °C) (Oral)   Ht 162.6 cm (64\")   Wt 88.6 kg (195 lb 6.4 oz)   BMI 33.54 kg/m²     Physical Exam  Vitals reviewed.   Constitutional:       Appearance: Normal appearance. She is well-developed.   HENT:      Head: Normocephalic and atraumatic.   Eyes:      Conjunctiva/sclera: Conjunctivae normal.      Pupils: Pupils are equal, round, and reactive to light.   Cardiovascular:      Rate and Rhythm: Normal rate and regular rhythm.      Heart sounds: Normal heart sounds. No murmur heard.     Pulmonary:      Effort: Pulmonary effort is normal. No respiratory distress.      Breath sounds: Normal breath sounds.   Musculoskeletal:         General: Normal range of motion.      Cervical back: Full passive range of motion without pain.      Lumbar back: Spasms and tenderness present.   Lymphadenopathy:      Upper Body:      Right upper body: Axillary adenopathy present.      Left upper body: Axillary adenopathy present.   Skin:     General: Skin is warm and dry.      Comments: Small quarter size area of complete hair " loss to posterior scalp   Neurological:      Mental Status: She is alert and oriented to person, place, and time.   Psychiatric:         Mood and Affect: Mood and affect normal.         Behavior: Behavior normal.         Thought Content: Thought content normal.         Judgment: Judgment normal.          Result Review :              Assessment and Plan    Diagnoses and all orders for this visit:    1. Hair loss (Primary)  Assessment & Plan:  Likely alopecia.  Will notify patient of laboratory results.    Orders:  -     TSH; Future  -     Vitamin B12; Future  -     CBC & Differential; Future  -     Comprehensive Metabolic Panel; Future    2. Mass of axilla, bilateral  Assessment & Plan:  Mammogram reviewed.  Breast exam was normal.  Will notify patient of ultrasound results.  Patient to notify office with any changes.  Continue breast exams.    Orders:  -     US Axilla Bilateral; Future    3. Back spasm  Assessment & Plan:  Kenalog and Toradol injections in office.  Zanaflex prescribed.  Potential side effects of medications discussed.  Patient to return to office as needed.    Orders:  -     ketorolac (TORADOL) injection 30 mg  -     triamcinolone acetonide (KENALOG-40) injection 40 mg    Other orders  -     tiZANidine (Zanaflex) 4 MG tablet; Take 1 tablet by mouth Every 6 (Six) Hours As Needed for Muscle Spasms.  Dispense: 30 tablet; Refill: 1       Patient to notify office with any acute concerns or issues.  Patient verbalizes understanding, agrees with plan of care and has no further questions upon discharge.    Please note that portions of this note were completed with a voice recognition program.    Follow Up    Return if symptoms worsen or fail to improve.  Patient was given instructions and counseling regarding her condition or for health maintenance advice. Please see specific information pulled into the AVS if appropriate.

## 2021-07-02 NOTE — ASSESSMENT & PLAN NOTE
Kenalog and Toradol injections in office.  Zanaflex prescribed.  Potential side effects of medications discussed.  Patient to return to office as needed.

## 2021-07-02 NOTE — ASSESSMENT & PLAN NOTE
Mammogram reviewed.  Breast exam was normal.  Will notify patient of ultrasound results.  Patient to notify office with any changes.  Continue breast exams.

## 2021-07-13 ENCOUNTER — HOSPITAL ENCOUNTER (OUTPATIENT)
Dept: ULTRASOUND IMAGING | Facility: HOSPITAL | Age: 61
End: 2021-07-13

## 2021-07-23 ENCOUNTER — LAB (OUTPATIENT)
Dept: LAB | Facility: HOSPITAL | Age: 61
End: 2021-07-23

## 2021-07-23 ENCOUNTER — HOSPITAL ENCOUNTER (OUTPATIENT)
Dept: ULTRASOUND IMAGING | Facility: HOSPITAL | Age: 61
Discharge: HOME | End: 2021-07-23

## 2021-07-23 ENCOUNTER — TELEPHONE (OUTPATIENT)
Dept: FAMILY MEDICINE CLINIC | Age: 61
End: 2021-07-23

## 2021-07-23 DIAGNOSIS — N17.9 ACUTE KIDNEY INJURY (HCC): ICD-10-CM

## 2021-07-23 DIAGNOSIS — R22.33 MASS OF AXILLA, BILATERAL: ICD-10-CM

## 2021-07-23 DIAGNOSIS — R30.0 DYSURIA: ICD-10-CM

## 2021-07-23 DIAGNOSIS — R30.0 DYSURIA: Primary | ICD-10-CM

## 2021-07-23 LAB
ANION GAP SERPL CALCULATED.3IONS-SCNC: 10.6 MMOL/L (ref 5–15)
BACTERIA UR QL AUTO: ABNORMAL /HPF
BILIRUB UR QL STRIP: NEGATIVE
BUN SERPL-MCNC: 18 MG/DL (ref 6–20)
BUN/CREAT SERPL: 18 (ref 7–25)
CALCIUM SPEC-SCNC: 9.8 MG/DL (ref 8.6–10.5)
CHLORIDE SERPL-SCNC: 99 MMOL/L (ref 98–107)
CLARITY UR: ABNORMAL
CO2 SERPL-SCNC: 27.4 MMOL/L (ref 22–29)
COLOR UR: YELLOW
CREAT SERPL-MCNC: 1 MG/DL (ref 0.57–1)
GFR SERPL CREATININE-BSD FRML MDRD: 69 ML/MIN/1.73
GLUCOSE SERPL-MCNC: 98 MG/DL (ref 65–99)
GLUCOSE UR STRIP-MCNC: NEGATIVE MG/DL
HGB UR QL STRIP.AUTO: NEGATIVE
KETONES UR QL STRIP: NEGATIVE
LEUKOCYTE ESTERASE UR QL STRIP.AUTO: ABNORMAL
MUCOUS THREADS URNS QL MICRO: ABNORMAL /HPF
NITRITE UR QL STRIP: NEGATIVE
PH UR STRIP.AUTO: 5.5 [PH] (ref 5–8)
POTASSIUM SERPL-SCNC: 4.1 MMOL/L (ref 3.5–5.2)
PROT UR QL STRIP: NEGATIVE
RBC # UR: ABNORMAL /HPF
REF LAB TEST METHOD: ABNORMAL
SODIUM SERPL-SCNC: 137 MMOL/L (ref 136–145)
SP GR UR STRIP: >=1.03 (ref 1–1.03)
SQUAMOUS #/AREA URNS HPF: ABNORMAL /HPF
UROBILINOGEN UR QL STRIP: ABNORMAL
WBC UR QL AUTO: ABNORMAL /HPF

## 2021-07-23 PROCEDURE — 76882 US LMTD JT/FCL EVL NVASC XTR: CPT

## 2021-07-23 PROCEDURE — 87086 URINE CULTURE/COLONY COUNT: CPT

## 2021-07-23 PROCEDURE — 80048 BASIC METABOLIC PNL TOTAL CA: CPT

## 2021-07-23 PROCEDURE — 36415 COLL VENOUS BLD VENIPUNCTURE: CPT

## 2021-07-23 PROCEDURE — 81001 URINALYSIS AUTO W/SCOPE: CPT

## 2021-07-23 RX ORDER — CEFDINIR 300 MG/1
300 CAPSULE ORAL 2 TIMES DAILY
Qty: 14 CAPSULE | Refills: 0 | Status: SHIPPED | OUTPATIENT
Start: 2021-07-23 | End: 2021-08-26 | Stop reason: SDUPTHER

## 2021-07-23 NOTE — TELEPHONE ENCOUNTER
Noted.  I am not quite sure I understand the message.  If she would like to go ahead and do a urinalysis with micro and urine culture here, make it so.  Thanks.

## 2021-07-23 NOTE — TELEPHONE ENCOUNTER
Caller: Chely Calderón    Relationship: Self    Best call back number: [phone]    What is the best time to reach you: ANYTIME    Who are you requesting to speak with (clinical staff, provider,  specific staff member): CLINICAL    What was the call regarding: THE PATIENT STATED SHE STARTED HAVING BURNING WHEN SHE URINATES ON 07/22/2021 AND BLADDER PAIN. SHE IS SCHEDULED FOR A TEST TO DETERMINE IF HER KIDNEYS ARE FUNCTIONING PROPERLY. SHE WANTED TO INFORM PCP.     Do you require a callback: NO

## 2021-07-24 LAB — BACTERIA SPEC AEROBE CULT: NORMAL

## 2021-08-26 ENCOUNTER — OFFICE VISIT (OUTPATIENT)
Dept: FAMILY MEDICINE CLINIC | Age: 61
End: 2021-08-26

## 2021-08-26 VITALS
SYSTOLIC BLOOD PRESSURE: 131 MMHG | HEIGHT: 64 IN | DIASTOLIC BLOOD PRESSURE: 72 MMHG | BODY MASS INDEX: 32.47 KG/M2 | TEMPERATURE: 97.9 F | WEIGHT: 190.2 LBS | HEART RATE: 66 BPM

## 2021-08-26 DIAGNOSIS — E78.2 MIXED HYPERLIPIDEMIA: ICD-10-CM

## 2021-08-26 DIAGNOSIS — Z79.899 OTHER LONG TERM (CURRENT) DRUG THERAPY: ICD-10-CM

## 2021-08-26 DIAGNOSIS — M54.50 LOW BACK PAIN, UNSPECIFIED BACK PAIN LATERALITY, UNSPECIFIED CHRONICITY, UNSPECIFIED WHETHER SCIATICA PRESENT: ICD-10-CM

## 2021-08-26 DIAGNOSIS — I10 ESSENTIAL HYPERTENSION: ICD-10-CM

## 2021-08-26 DIAGNOSIS — Z00.00 PHYSICAL EXAM: Primary | ICD-10-CM

## 2021-08-26 LAB
AMPHET+METHAMPHET UR QL: NEGATIVE
AMPHETAMINES UR QL: NEGATIVE
BARBITURATES UR QL SCN: NEGATIVE
BENZODIAZ UR QL SCN: NEGATIVE
BUPRENORPHINE SERPL-MCNC: NEGATIVE NG/ML
CANNABINOIDS SERPL QL: POSITIVE
COCAINE UR QL: NEGATIVE
EXPIRATION DATE: ABNORMAL
Lab: ABNORMAL
MDMA UR QL SCN: NEGATIVE
METHADONE UR QL SCN: NEGATIVE
OPIATES UR QL: NEGATIVE
OXYCODONE UR QL SCN: NEGATIVE
PCP UR QL SCN: NEGATIVE

## 2021-08-26 PROCEDURE — 80305 DRUG TEST PRSMV DIR OPT OBS: CPT | Performed by: FAMILY MEDICINE

## 2021-08-26 PROCEDURE — 99214 OFFICE O/P EST MOD 30 MIN: CPT | Performed by: FAMILY MEDICINE

## 2021-08-26 PROCEDURE — 99396 PREV VISIT EST AGE 40-64: CPT | Performed by: FAMILY MEDICINE

## 2021-08-26 RX ORDER — GABAPENTIN 300 MG/1
300 CAPSULE ORAL 3 TIMES DAILY
Qty: 270 CAPSULE | Refills: 1 | Status: SHIPPED | OUTPATIENT
Start: 2021-08-26 | End: 2022-02-23 | Stop reason: SDUPTHER

## 2021-08-26 RX ORDER — HYDROCHLOROTHIAZIDE 25 MG/1
25 TABLET ORAL DAILY
Qty: 90 TABLET | Refills: 3 | Status: SHIPPED | OUTPATIENT
Start: 2021-08-26 | End: 2022-03-17 | Stop reason: SDUPTHER

## 2021-08-26 RX ORDER — CARVEDILOL 6.25 MG/1
1 TABLET ORAL 2 TIMES DAILY
COMMUNITY
Start: 2021-07-22 | End: 2021-08-26 | Stop reason: SDUPTHER

## 2021-08-26 RX ORDER — LOSARTAN POTASSIUM 100 MG/1
100 TABLET ORAL DAILY
Qty: 90 TABLET | Refills: 3 | Status: SHIPPED | OUTPATIENT
Start: 2021-08-26 | End: 2022-03-17 | Stop reason: SDUPTHER

## 2021-08-26 RX ORDER — AMLODIPINE BESYLATE 5 MG/1
5 TABLET ORAL DAILY
Qty: 90 TABLET | Refills: 3 | Status: SHIPPED | OUTPATIENT
Start: 2021-08-26 | End: 2021-10-18

## 2021-08-26 RX ORDER — ATORVASTATIN CALCIUM 20 MG/1
20 TABLET, FILM COATED ORAL DAILY
Qty: 90 TABLET | Refills: 3 | Status: SHIPPED | OUTPATIENT
Start: 2021-08-26 | End: 2022-03-17 | Stop reason: SDUPTHER

## 2021-08-26 RX ORDER — OMEPRAZOLE 40 MG/1
40 CAPSULE, DELAYED RELEASE ORAL DAILY
Qty: 90 CAPSULE | Refills: 3 | Status: SHIPPED | OUTPATIENT
Start: 2021-08-26 | End: 2022-03-17 | Stop reason: SDUPTHER

## 2021-08-26 RX ORDER — CARVEDILOL 6.25 MG/1
6.25 TABLET ORAL 2 TIMES DAILY
Qty: 180 TABLET | Refills: 3 | Status: SHIPPED | OUTPATIENT
Start: 2021-08-26 | End: 2022-03-17 | Stop reason: SDUPTHER

## 2021-08-26 NOTE — PROGRESS NOTES
Subsequent Medicare Wellness Visit  The ABC's of Medicare Wellness Visit    Chief Complaint:  Medicare wellness exam, follow up on blood pressure, cholesterol, low back pain    Subjective   History of Present Illness      Chely Calderón is a 60 y.o. female who presents   for a Subsequent Medicare Wellness Visit.    Does the patient have evidence of cognitive impairment?   No    Asprin use counseling:Does not need ASA (and currently is not on it)    Recent Hospitalizations:  No hospitalization(s) within the last year.    Advanced Care Planning:  ACP discussion was held with the patient during this visit. Patient does not have an advance directive, information provided.    The following portions of the patient's history were reviewed   and updated as appropriate: allergies, current medications, past family history, past medical history, past social history, past surgical history and problem list.    Compared to one year ago, the patient feels her   physical health is the same.  Compared to one year ago, the patient feels her   mental health is better.    Reviewed chart for potential of high risk medication in the elderly:  yes  Reviewed chart for potential of harmful drug interactions in the elderly:  yes    Patient's Body mass index is 32.63 kg/m². indicating that she is obese (BMI >30). Obesity-related health conditions include the following: hypertension and dyslipidemias. Obesity is improving with treatment. BMI is is above average; BMI management plan is completed. We discussed portion control and increasing exercise..     HEALTH RISK ASSESSMENT BEGIN  Fall Risk Screen:  STEADI Fall Risk Assessment has not been completed.    Depression Screen:   PHQ-2/PHQ-9 Depression Screening 8/26/2021   Little interest or pleasure in doing things 0   Feeling down, depressed, or hopeless 0   Total Score 0       Current Medical Providers:  Patient Care Team:  Manoj Ybarra MD as PCP - General (Family  Medicine)    Smoking Status:  Social History     Tobacco Use   Smoking Status Former Smoker   • Packs/day: 0.00   • Years: 0.00   • Pack years: 0.00   • Types: Cigarettes   • Quit date:    • Years since quittin.6   Smokeless Tobacco Never Used       Alcohol Consumption:  Social History     Substance and Sexual Activity   Alcohol Use Yes   • Alcohol/week: 5.0 standard drinks   • Types: 5 Standard drinks or equivalent per week    Comment: 4-5 times a week       Health Habits and Functional and Cognitive Screening:  Functional & Cognitive Status 2021   Do you have difficulty preparing food and eating? No   Do you have difficulty bathing yourself, getting dressed or grooming yourself? Yes   Do you have difficulty using the toilet? No   Do you have difficulty moving around from place to place? Yes   Do you have trouble with steps or getting out of a bed or a chair? Yes   Current Diet Well Balanced Diet   Dental Exam Not up to date   Eye Exam Up to date   Exercise (times per week) 1 times per week   Current Exercises Include Walking   Do you need help using the phone?  No   Are you deaf or do you have serious difficulty hearing?  No   Do you need help with transportation? No   Do you need help shopping? Yes   Do you need help preparing meals?  No   Do you need help with housework?  No   Do you need help with laundry? No   Do you need help taking your medications? No   Do you need help managing money? No   Do you ever drive or ride in a car without wearing a seat belt? No   Have you felt unusual stress, anger or loneliness in the last month? Yes   Who do you live with? Alone   If you need help, do you have trouble finding someone available to you? No   Have you been bothered in the last four weeks by sexual problems? No   Do you have difficulty concentrating, remembering or making decisions? Yes       Age-appropriate Screening Schedule:  Refer to the list below for future screening recommendations based on  patient's age, sex and/or medical conditions. Orders for these recommended tests are listed in the plan section. The patient has been provided with a written plan.    Health Maintenance   Topic Date Due   • TDAP/TD VACCINES (1 - Tdap) Never done   • ZOSTER VACCINE (1 of 2) Never done   • PAP SMEAR  Never done   • INFLUENZA VACCINE  10/01/2021   • LIPID PANEL  03/22/2022   • MAMMOGRAM  02/08/2023     HEALTH RISK ASSESSMENT END    Outpatient Medications Prior to Visit   Medication Sig Dispense Refill   • tiZANidine (Zanaflex) 4 MG tablet Take 1 tablet by mouth Every 6 (Six) Hours As Needed for Muscle Spasms. 30 tablet 1   • traZODone (DESYREL) 50 MG tablet Take 1 tablet by mouth As Needed.     • amLODIPine (NORVASC) 5 MG tablet Take 1 tablet by mouth Daily.     • atorvastatin (LIPITOR) 20 MG tablet Take 1 tablet by mouth Daily.     • carvedilol (COREG) 6.25 MG tablet Take 1 tablet by mouth 2 (two) times a day.     • gabapentin (NEURONTIN) 300 MG capsule Take 1 capsule by mouth 3 (Three) Times a Day.     • hydroCHLOROthiazide (HYDRODIURIL) 25 MG tablet Take 1 tablet by mouth Daily.     • losartan (COZAAR) 100 MG tablet Take 1 tablet by mouth Daily.     • omeprazole (priLOSEC) 40 MG capsule Take 1 capsule by mouth Daily.     • carvedilol (COREG) 3.125 MG tablet Take 1 tablet by mouth 2 (two) times a day.     • cefdinir (OMNICEF) 300 MG capsule Take 1 capsule by mouth 2 (Two) Times a Day. 14 capsule 0   • sucralfate (CARAFATE) 1 g tablet Take 1 tablet by mouth Daily.       No facility-administered medications prior to visit.       Patient Active Problem List   Diagnosis   • Hair loss   • Mass of axilla, bilateral   • Back spasm   • Essential hypertension   • Mixed hyperlipidemia   • Low back pain   • Other long term (current) drug therapy       In addition to her wellness exam, Chely is due for follow-up and refills for her usual problems.  She does have a history of hypertension for which she currently takes several  "medications for her blood pressure.  We have reviewed those as noted below.  She tolerates these relatively well and her blood pressure is well controlled today.    She also has a history of elevated cholesterol for which she takes generic Lipitor.  She has been on this for some time and is doing relatively well.  She did have recent lipid panel that looked good.    Chely is also in for follow up on fibromyalgia and lower back pain for which she remains on gabapentin.  She takes gabapentin about 3 times daily, and she says that it is of benefit for her.  She continues to see pain management for injections with the low back as well.  Nathan is okay.      Review of Systems:  Review of Systems   Constitutional: Negative for chills and fever.   Respiratory: Negative for cough and shortness of breath.    Cardiovascular: Negative for chest pain and palpitations.   Gastrointestinal: Negative for abdominal pain, nausea and vomiting.        Objective      Vitals:    08/26/21 0913   BP: 131/72   BP Location: Right arm   Patient Position: Sitting   Pulse: 66   Temp: 97.9 °F (36.6 °C)   TempSrc: Oral   Weight: 86.3 kg (190 lb 3.2 oz)   Height: 162.6 cm (64.02\")   PainSc:   6   PainLoc: Back       Physical Exam:  Physical Exam  Vitals and nursing note reviewed.   Constitutional:       General: She is not in acute distress.     Appearance: She is not ill-appearing.   HENT:      Right Ear: Tympanic membrane and ear canal normal.      Left Ear: Tympanic membrane and ear canal normal.      Ears:      Comments: Bilateral hearing is normal.     Mouth/Throat:      Mouth: Mucous membranes are moist.      Comments: Pharynx appears normal  Eyes:      Extraocular Movements: Extraocular movements intact.      Pupils: Pupils are equal, round, and reactive to light.      Comments: Binocular vision is 20/20 with correction.   Neck:      Thyroid: No thyromegaly.   Cardiovascular:      Rate and Rhythm: Normal rate and regular rhythm.      " Heart sounds: No murmur heard.     Pulmonary:      Effort: Pulmonary effort is normal.      Breath sounds: Normal breath sounds.   Abdominal:      General: There is no distension.      Palpations: Abdomen is soft. There is no mass.      Tenderness: There is no abdominal tenderness.   Musculoskeletal:      Cervical back: Normal range of motion.   Skin:     Findings: No lesion or rash.   Neurological:      General: No focal deficit present.      Mental Status: She is oriented to person, place, and time.      Cranial Nerves: No cranial nerve deficit.   Psychiatric:         Mood and Affect: Mood normal.       Result Review :           The data below has been reviewed by Manoj Ybarra MD on 08/26/2021.  Lab Results   Component Value Date    WBC 7.04 07/02/2021    HGB 13.2 07/02/2021    HCT 40.6 07/02/2021    MCV 86.8 07/02/2021     07/02/2021     Lab Results   Component Value Date    GLUCOSE 98 07/23/2021    BUN 18 07/23/2021    CREATININE 1.00 07/23/2021    EGFRIFAFRI 69 07/23/2021    BCR 18.0 07/23/2021    K 4.1 07/23/2021    CO2 27.4 07/23/2021    CALCIUM 9.8 07/23/2021    ALBUMIN 4.90 07/02/2021    LABIL2 1.8 03/22/2021    AST 21 07/02/2021    ALT 17 07/02/2021     Lab Results   Component Value Date    TSH 1.850 07/02/2021     Lab Results   Component Value Date    CHLPL 175 03/22/2021    TRIG 82 03/22/2021    HDL 54 03/22/2021     (H) 03/22/2021           Assessment/Plan   Assessment and Plan:   Today, we have reviewed Chely's care.  She is generally up-to-date on needed screenings and exams.  Please see below for her specific recommendations and discussion.  We did review her usual problems including her use of gabapentin.  It is helpful for her, and she does have chronic pain.  She does use marijuana occasionally.  We have reviewed this and will go ahead and get a drug screen.  However, I do think it is reasonable to continue gabapentin.  There is been no evidence of diversion or other  concern regarding this.  We will continue to follow closely.  No other specific changes are anticipated.         Diagnoses and all orders for this visit:    1. Physical exam (Primary)    2. Essential hypertension  -     losartan (COZAAR) 100 MG tablet; Take 1 tablet by mouth Daily.  Dispense: 90 tablet; Refill: 3  -     hydroCHLOROthiazide (HYDRODIURIL) 25 MG tablet; Take 1 tablet by mouth Daily.  Dispense: 90 tablet; Refill: 3  -     carvedilol (COREG) 6.25 MG tablet; Take 1 tablet by mouth 2 (two) times a day.  Dispense: 180 tablet; Refill: 3  -     amLODIPine (NORVASC) 5 MG tablet; Take 1 tablet by mouth Daily.  Dispense: 90 tablet; Refill: 3    3. Mixed hyperlipidemia  -     atorvastatin (LIPITOR) 20 MG tablet; Take 1 tablet by mouth Daily.  Dispense: 90 tablet; Refill: 3    4. Low back pain, unspecified back pain laterality, unspecified chronicity, unspecified whether sciatica present  -     gabapentin (NEURONTIN) 300 MG capsule; Take 1 capsule by mouth 3 (Three) Times a Day.  Dispense: 270 capsule; Refill: 1  -     POC Urine Drug Screen [facility]    5. Other long term (current) drug therapy  -     gabapentin (NEURONTIN) 300 MG capsule; Take 1 capsule by mouth 3 (Three) Times a Day.  Dispense: 270 capsule; Refill: 1  -     POC Urine Drug Screen [facility]    Other orders  -     omeprazole (priLOSEC) 40 MG capsule; Take 1 capsule by mouth Daily.  Dispense: 90 capsule; Refill: 3      Medicare Risks and Personalized Health Plan  CMS Preventative Services Quick Reference  Advance Directive Discussion  Breast Cancer/Mammogram Screening  Cardiovascular risk  Chronic Pain   Colon Cancer Screening  Immunizations Discussed/Encouraged (specific immunizations; Tdap, Shingrix and COVID19 )  Obesity/Overweight     The above risks/problems have been discussed with the patient.  Pertinent information has been shared with the patient in the   After Visit Summary. Follow up plans and orders are seen above   in the  Assessment/Plan Section.        Follow Up    Return in about 6 months (around 2/26/2022).     An After Visit Summary and PPPS were given to the patient.

## 2021-08-26 NOTE — PATIENT INSTRUCTIONS
Advance Care Planning and Advance Directives     You make decisions on a daily basis - decisions about where you want to live, your career, your home, your life. Perhaps one of the most important decisions you face is your choice for future medical care. Take time to talk with your family and your healthcare team and start planning today.  Advance Care Planning is a process that can help you:  · Understand possible future healthcare decisions in light of your own experiences  · Reflect on those decision in light of your goals and values  · Discuss your decisions with those closest to you and the healthcare professionals that care for you  · Make a plan by creating a document that reflects your wishes    Surrogate Decision Maker  In the event of a medical emergency, which has left you unable to communicate or to make your own decisions, you would need someone to make decisions for you.  It is important to discuss your preferences for medical treatment with this person while you are in good health.     Qualities of a surrogate decision maker:  • Willing to take on this role and responsibility  • Knows what you want for future medical care  • Willing to follow your wishes even if they don't agree with them  • Able to make difficult medical decisions under stressful circumstances    Advance Directives  These are legal documents you can create that will guide your healthcare team and decision maker(s) when needed. These documents can be stored in the electronic medical record.    · Living Will - a legal document to guide your care if you have a terminal condition or a serious illness and are unable to communicate. States vary by statute in document names/types, but most forms may include one or more of the following:        -  Directions regarding life-prolonging treatments        -  Directions regarding artificially provided nutrition/hydration        -  Choosing a healthcare decision maker        -  Direction  regarding organ/tissue donation    · Durable Power of  for Healthcare - this document names an -in-fact to make medical decisions for you, but it may also allow this person to make personal and financial decisions for you. Please seek the advice of an  if you need this type of document.    **Advance Directives are not required and no one may discriminate against you if you do not sign one.    Medical Orders  Many states allow specific forms/orders signed by your physician to record your wishes for medical treatment in your current state of health. This form, signed in personal communication with your physician, addresses resuscitation and other medical interventions that you may or may not want.      For more information or to schedule a time with a [facility] Advance Care Planning Facilitator contact: [facility] or call [phone] and someone will contact you directly.  Preventive Care 40-64 Years Old, Female  Preventive care refers to visits with your health care provider and lifestyle choices that can promote health and wellness. This includes:  · A yearly physical exam. This may also be called an annual well check.  · Regular dental visits and eye exams.  · Immunizations.  · Screening for certain conditions.  · Healthy lifestyle choices, such as eating a healthy diet, getting regular exercise, not using drugs or products that contain nicotine and tobacco, and limiting alcohol use.  What can I expect for my preventive care visit?  Physical exam  Your health care provider will check your:  · Height and weight. This may be used to calculate body mass index (BMI), which tells if you are at a healthy weight.  · Heart rate and blood pressure.  · Skin for abnormal spots.  Counseling  Your health care provider may ask you questions about your:  · Alcohol, tobacco, and drug use.  · Emotional well-being.  · Home and relationship well-being.  · Sexual activity.  · Eating  habits.  · Work and work environment.  · Method of birth control.  · Menstrual cycle.  · Pregnancy history.  What immunizations do I need?    Influenza (flu) vaccine  · This is recommended every year.  Tetanus, diphtheria, and pertussis (Tdap) vaccine  · You may need a Td booster every 10 years.  Varicella (chickenpox) vaccine  · You may need this if you have not been vaccinated.  Zoster (shingles) vaccine  · You may need this after age 60.  Measles, mumps, and rubella (MMR) vaccine  · You may need at least one dose of MMR if you were born in 1957 or later. You may also need a second dose.  Pneumococcal conjugate (PCV13) vaccine  · You may need this if you have certain conditions and were not previously vaccinated.  Pneumococcal polysaccharide (PPSV23) vaccine  · You may need one or two doses if you smoke cigarettes or if you have certain conditions.  Meningococcal conjugate (MenACWY) vaccine  · You may need this if you have certain conditions.  Hepatitis A vaccine  · You may need this if you have certain conditions or if you travel or work in places where you may be exposed to hepatitis A.  Hepatitis B vaccine  · You may need this if you have certain conditions or if you travel or work in places where you may be exposed to hepatitis B.  Haemophilus influenzae type b (Hib) vaccine  · You may need this if you have certain conditions.  Human papillomavirus (HPV) vaccine  · If recommended by your health care provider, you may need three doses over 6 months.  You may receive vaccines as individual doses or as more than one vaccine together in one shot (combination vaccines). Talk with your health care provider about the risks and benefits of combination vaccines.  What tests do I need?  Blood tests  · Lipid and cholesterol levels. These may be checked every 5 years, or more frequently if you are over 50 years old.  · Hepatitis C test.  · Hepatitis B test.  Screening  · Lung cancer screening. You may have this screening  every year starting at age 55 if you have a 30-pack-year history of smoking and currently smoke or have quit within the past 15 years.  · Colorectal cancer screening. All adults should have this screening starting at age 50 and continuing until age 75. Your health care provider may recommend screening at age 45 if you are at increased risk. You will have tests every 1-10 years, depending on your results and the type of screening test.  · Diabetes screening. This is done by checking your blood sugar (glucose) after you have not eaten for a while (fasting). You may have this done every 1-3 years.  · Mammogram. This may be done every 1-2 years. Talk with your health care provider about when you should start having regular mammograms. This may depend on whether you have a family history of breast cancer.  · BRCA-related cancer screening. This may be done if you have a family history of breast, ovarian, tubal, or peritoneal cancers.  · Pelvic exam and Pap test. This may be done every 3 years starting at age 21. Starting at age 30, this may be done every 5 years if you have a Pap test in combination with an HPV test.  Other tests  · Sexually transmitted disease (STD) testing.  · Bone density scan. This is done to screen for osteoporosis. You may have this scan if you are at high risk for osteoporosis.  Follow these instructions at home:  Eating and drinking  · Eat a diet that includes fresh fruits and vegetables, whole grains, lean protein, and low-fat dairy.  · Take vitamin and mineral supplements as recommended by your health care provider.  · Do not drink alcohol if:  ? Your health care provider tells you not to drink.  ? You are pregnant, may be pregnant, or are planning to become pregnant.  · If you drink alcohol:  ? Limit how much you have to 0-1 drink a day.  ? Be aware of how much alcohol is in your drink. In the [place] one drink equals one 12 oz bottle of beer (355 mL), one 5 oz glass of wine (148 mL), or one 1½  oz glass of hard liquor (44 mL).  Lifestyle  · Take daily care of your teeth and gums.  · Stay active. Exercise for at least 30 minutes on 5 or more days each week.  · Do not use any products that contain nicotine or tobacco, such as cigarettes, e-cigarettes, and chewing tobacco. If you need help quitting, ask your health care provider.  · If you are sexually active, practice safe sex. Use a condom or other form of birth control (contraception) in order to prevent pregnancy and STIs (sexually transmitted infections).  · If told by your health care provider, take low-dose aspirin daily starting at age 50.  What's next?  · Visit your health care provider once a year for a well check visit.  · Ask your health care provider how often you should have your eyes and teeth checked.  · Stay up to date on all vaccines.  This information is not intended to replace advice given to you by your health care provider. Make sure you discuss any questions you have with your health care provider.  Document Revised: 08/29/2019 Document Reviewed: 08/29/2019  Elsevier Patient Education © 2020 [employer]

## 2021-09-01 ENCOUNTER — OFFICE VISIT (OUTPATIENT)
Dept: FAMILY MEDICINE CLINIC | Age: 61
End: 2021-09-01

## 2021-09-01 ENCOUNTER — TELEPHONE (OUTPATIENT)
Dept: FAMILY MEDICINE CLINIC | Age: 61
End: 2021-09-01

## 2021-09-01 VITALS
OXYGEN SATURATION: 100 % | HEIGHT: 64 IN | TEMPERATURE: 98.3 F | SYSTOLIC BLOOD PRESSURE: 138 MMHG | BODY MASS INDEX: 32.54 KG/M2 | WEIGHT: 190.6 LBS | HEART RATE: 57 BPM | DIASTOLIC BLOOD PRESSURE: 67 MMHG

## 2021-09-01 DIAGNOSIS — R11.0 NAUSEA: ICD-10-CM

## 2021-09-01 DIAGNOSIS — R42 DIZZINESS: ICD-10-CM

## 2021-09-01 DIAGNOSIS — R51.9 NONINTRACTABLE HEADACHE, UNSPECIFIED CHRONICITY PATTERN, UNSPECIFIED HEADACHE TYPE: ICD-10-CM

## 2021-09-01 DIAGNOSIS — R42 VERTIGO: Primary | ICD-10-CM

## 2021-09-01 PROBLEM — R25.2 MUSCLE CRAMPS: Status: ACTIVE | Noted: 2021-07-02

## 2021-09-01 PROBLEM — M54.16 LUMBAR RADICULOPATHY: Status: ACTIVE | Noted: 2021-09-01

## 2021-09-01 PROBLEM — E78.5 HYPERLIPIDEMIA: Status: ACTIVE | Noted: 2021-08-26

## 2021-09-01 LAB
EXPIRATION DATE: NORMAL
INTERNAL CONTROL: NORMAL
Lab: NORMAL
SARS-COV-2 AG UPPER RESP QL IA.RAPID: NOT DETECTED

## 2021-09-01 PROCEDURE — 99213 OFFICE O/P EST LOW 20 MIN: CPT | Performed by: NURSE PRACTITIONER

## 2021-09-01 PROCEDURE — 87426 SARSCOV CORONAVIRUS AG IA: CPT | Performed by: NURSE PRACTITIONER

## 2021-09-01 RX ORDER — MECLIZINE HYDROCHLORIDE 25 MG/1
25 TABLET ORAL 3 TIMES DAILY PRN
Status: DISCONTINUED | OUTPATIENT
Start: 2021-09-01 | End: 2021-09-01

## 2021-09-01 RX ORDER — MECLIZINE HYDROCHLORIDE 25 MG/1
25 TABLET ORAL 3 TIMES DAILY PRN
Qty: 30 TABLET | Refills: 0 | Status: SHIPPED | OUTPATIENT
Start: 2021-09-01 | End: 2021-09-11

## 2021-09-01 RX ORDER — ONDANSETRON 4 MG/1
4 TABLET, FILM COATED ORAL EVERY 8 HOURS PRN
Qty: 10 TABLET | Refills: 0 | Status: SHIPPED | OUTPATIENT
Start: 2021-09-01 | End: 2022-09-19

## 2021-09-01 NOTE — PATIENT INSTRUCTIONS
Please make sure to schedule a Wellness visit each year with your primary care provider to discuss health screenings, immunization recommendations, routine lab analysis and general health recommendations.   Most insurance covers an annual wellness visit at no cost to you one time per year.  Please call and schedule yours today!    If you feel you are worsening over the next few days, it is advised you either return to office for further recommendations,or  consider going to urgent care / Emergency room if our office is unable to provide an appointment that will meet your needs.

## 2021-09-01 NOTE — PROGRESS NOTES
"Chief Complaint  Chely Calderón presents to [facility] for Dizziness (started today ) and Headache (\"not a headache put a pain in m head, [place] on a steel shelf\" )    Subjective          Dizziness  This is a new problem. The current episode started today. The problem occurs constantly. The problem has been gradually improving. Associated symptoms include fatigue, nausea and vertigo. The symptoms are aggravated by twisting and walking.     Regarding hitting head, bumped on shelf in restroom,  Got sore but no problems immediately following      Review of Systems   Constitutional: Positive for fatigue.   HENT: Positive for sinus pressure. Negative for ear pain.    Gastrointestinal: Positive for nausea.   Neurological: Positive for dizziness and vertigo.         Allergies   Allergen Reactions   • Lisinopril Cough      Past Medical History:   Diagnosis Date   • Arthritis    • Dysuria    • Essential (primary) hypertension    • Family history of malignant neoplasm of breast    • Fever    • Fibromyalgia    • Fibromyalgia, primary 2001   • GERD without esophagitis    • Low back pain    • Mixed hyperlipidemia    • Other fatigue    • Other specified anxiety disorders    • Spontaneous ecchymoses    • Unspecified abdominal pain    • Unspecified lump in the right breast, unspecified quadrant      Current Outpatient Medications   Medication Sig Dispense Refill   • amLODIPine (NORVASC) 5 MG tablet Take 1 tablet by mouth Daily. 90 tablet 3   • atorvastatin (LIPITOR) 20 MG tablet Take 1 tablet by mouth Daily. 90 tablet 3   • carvedilol (COREG) 6.25 MG tablet Take 1 tablet by mouth 2 (two) times a day. 180 tablet 3   • gabapentin (NEURONTIN) 300 MG capsule Take 1 capsule by mouth 3 (Three) Times a Day. 270 capsule 1   • hydroCHLOROthiazide (HYDRODIURIL) 25 MG tablet Take 1 tablet by mouth Daily. 90 tablet 3   • losartan (COZAAR) 100 MG tablet Take 1 tablet " by mouth Daily. 90 tablet 3   • omeprazole (priLOSEC) 40 MG capsule Take 1 capsule by mouth Daily. 90 capsule 3   • tiZANidine (Zanaflex) 4 MG tablet Take 1 tablet by mouth Every 6 (Six) Hours As Needed for Muscle Spasms. 30 tablet 1   • traZODone (DESYREL) 50 MG tablet Take 1 tablet by mouth As Needed.     • meclizine (ANTIVERT) 25 MG tablet Take 1 tablet by mouth 3 (Three) Times a Day As Needed for Dizziness for up to 10 days. 30 tablet 0   • ondansetron (Zofran) 4 MG tablet Take 1 tablet by mouth Every 8 (Eight) Hours As Needed for Nausea or Vomiting. 10 tablet 0     No current facility-administered medications for this visit.     Past Surgical History:   Procedure Laterality Date   • CARDIAC CATHETERIZATION     • CHOLECYSTECTOMY     • COLONOSCOPY     • ENDOMETRIAL ABLATION     • TUBAL ABDOMINAL LIGATION        Social History     Tobacco Use   • Smoking status: Former Smoker     Packs/day: 0.50     Years: 8.00     Pack years: 4.00     Types: Cigarettes     Quit date:      Years since quittin.6   • Smokeless tobacco: Never Used   Substance Use Topics   • Alcohol use: Yes     Alcohol/week: 5.0 standard drinks     Types: 5 Standard drinks or equivalent per week     Comment: 4-5 times a week   • Drug use: Never     Family History   Problem Relation Age of Onset   • Hyperlipidemia Mother    • Diabetes type II Mother    • Arthritis Mother    • Heart disease Mother    • Breast cancer Sister    • Arthritis Sister    • Cancer Sister    • Arthritis Father      Health Maintenance Due   Topic Date Due   • TDAP/TD VACCINES (1 - Tdap) Never done   • ZOSTER VACCINE (1 of 2) Never done   • HEPATITIS C SCREENING  Never done   • PAP SMEAR  Never done      Immunization History   Administered Date(s) Administered   • COVID-19 (MODERNA) 2021, 2021   • COVID-19 (UNSPECIFIED) 2021   • Flu Vaccine Intradermal Quad 18-64YR 10/16/2012, 2016, 2017   • Influenza Quad Vaccine (Inpatient) 2013   •  "Influenza, Unspecified 10/22/2020        Objective     Vitals:    09/01/21 1422   BP: 138/67   BP Location: Left arm   Patient Position: Sitting   Pulse: 57   Temp: 98.3 °F (36.8 °C)   SpO2: 100%   Weight: 86.5 kg (190 lb 9.6 oz)   Height: 162.6 cm (64.02\")     Body mass index is 32.7 kg/m².     Physical Exam  Constitutional:       General: She is not in acute distress.     Appearance: Normal appearance. She is ill-appearing (from dizziness).   HENT:      Head: Normocephalic.   Cardiovascular:      Rate and Rhythm: Normal rate and regular rhythm.   Pulmonary:      Effort: Pulmonary effort is normal.      Breath sounds: Normal breath sounds.   Musculoskeletal:         General: Normal range of motion.   Neurological:      General: No focal deficit present.      Mental Status: She is alert and oriented to person, place, and time.      Coordination: Coordination is intact.      Gait: Gait is intact.      Comments: Positive Loly Halpike central    Psychiatric:         Mood and Affect: Mood normal.         Behavior: Behavior normal.           Result Review :     The following data was reviewed by: VALARIE Carpio on 09/01/2021:    POCT SARS-CoV-2 Antigen FLEX (09/01/2021 14:28)                        Assessment and Plan      Diagnoses and all orders for this visit:    1. Vertigo (Primary)  Comments:  will treat for vertigo, consider referral to PT if not able to resolve at home;  Epley maneuver instructions given   Orders:  -     Discontinue: meclizine (ANTIVERT) tablet 25 mg  -     meclizine (ANTIVERT) 25 MG tablet; Take 1 tablet by mouth 3 (Three) Times a Day As Needed for Dizziness for up to 10 days.  Dispense: 30 tablet; Refill: 0    2. Dizziness  Comments:  follow up if not resolved in 2-3 days ;  to ER if symptoms worsening or change  Orders:  -     Discontinue: meclizine (ANTIVERT) tablet 25 mg  -     meclizine (ANTIVERT) 25 MG tablet; Take 1 tablet by mouth 3 (Three) Times a Day As Needed for Dizziness for up " to 10 days.  Dispense: 30 tablet; Refill: 0  -     Cancel: Beta Strep Culture, Throat - , Throat; Future  -     Beta Strep Culture, Throat - Swab, Throat    3. Nonintractable headache, unspecified chronicity pattern, unspecified headache type  Comments:  consider allergies/ sinus contribution   follow up if no improvement   Orders:  -     POCT SARS-CoV-2 Antigen FLEX  -     Discontinue: meclizine (ANTIVERT) tablet 25 mg  -     Cancel: Beta Strep Culture, Throat - , Throat; Future  -     Beta Strep Culture, Throat - Swab, Throat    4. Nausea  -     ondansetron (Zofran) 4 MG tablet; Take 1 tablet by mouth Every 8 (Eight) Hours As Needed for Nausea or Vomiting.  Dispense: 10 tablet; Refill: 0              Follow Up     Return if symptoms worsen or fail to improve.

## 2021-10-15 ENCOUNTER — TELEPHONE (OUTPATIENT)
Dept: FAMILY MEDICINE CLINIC | Age: 61
End: 2021-10-15

## 2021-10-15 NOTE — TELEPHONE ENCOUNTER
There has not been a final recommendation made regarding Moderna. I would not advise a booster at this point. Thanks.

## 2021-10-15 NOTE — TELEPHONE ENCOUNTER
Caller: Chely Calderón    Relationship to patient: Self    Best call back number: [phone]    Patient is needing: PATIENT CALLED IN AND WANTS TO KNOW IF SHE WOULD QUALIFY FOR THE COVID BOOSTER SHOT. PLEASE CALL PATIENT AND ADVISE.

## 2021-10-18 DIAGNOSIS — I10 ESSENTIAL HYPERTENSION: ICD-10-CM

## 2021-10-18 RX ORDER — AMLODIPINE BESYLATE 5 MG/1
TABLET ORAL
Qty: 90 TABLET | Refills: 0 | Status: SHIPPED | OUTPATIENT
Start: 2021-10-18 | End: 2021-11-04

## 2021-11-03 ENCOUNTER — TELEPHONE (OUTPATIENT)
Dept: FAMILY MEDICINE CLINIC | Age: 61
End: 2021-11-03

## 2021-11-03 NOTE — TELEPHONE ENCOUNTER
Pt inf  out of office this week, offered appt with another provider, states she will see how she feels tomorrow and will call back then if she wants an appt.

## 2021-11-03 NOTE — TELEPHONE ENCOUNTER
Caller: Chely Calderón    Relationship: Self    Best call back number: [phone]    What is the best time to reach you: ANY    Who are you requesting to speak with (clinical staff, provider,  specific staff member): DR GUARDADO    What was the call regarding: PATIENT CALLED STATING THAT SHE WOULD LIKE DR GUARDADO TO KNOW THAT SHE IS STILL EXPERIENCING DIZZINESS, HEADACHES, AND NAUSEA, AND WOULD LIKE TO KNOW IF SHE NEEDS TO MAKE ANOTHER APPOINTMENT.     SHE WOULD ALSO LIKE DR GUARDADO TO KNOW THAT LAST WEEK SHE HAD RADIOFREQUENCY ABLATION DONE ON HER LOWER BACK.     Do you require a callback: YES

## 2021-11-04 ENCOUNTER — HOSPITAL ENCOUNTER (OUTPATIENT)
Dept: GENERAL RADIOLOGY | Facility: HOSPITAL | Age: 61
Discharge: HOME | End: 2021-11-04
Admitting: NURSE PRACTITIONER

## 2021-11-04 ENCOUNTER — OFFICE VISIT (OUTPATIENT)
Dept: FAMILY MEDICINE CLINIC | Age: 61
End: 2021-11-04

## 2021-11-04 VITALS
HEIGHT: 64 IN | HEART RATE: 61 BPM | SYSTOLIC BLOOD PRESSURE: 132 MMHG | OXYGEN SATURATION: 98 % | WEIGHT: 195.4 LBS | TEMPERATURE: 98.1 F | DIASTOLIC BLOOD PRESSURE: 83 MMHG | BODY MASS INDEX: 33.36 KG/M2

## 2021-11-04 DIAGNOSIS — R00.2 PALPITATION: ICD-10-CM

## 2021-11-04 DIAGNOSIS — R06.02 SHORTNESS OF BREATH: ICD-10-CM

## 2021-11-04 DIAGNOSIS — F41.8 OTHER SPECIFIED ANXIETY DISORDERS: ICD-10-CM

## 2021-11-04 DIAGNOSIS — R07.9 CHEST PAIN, UNSPECIFIED TYPE: Primary | ICD-10-CM

## 2021-11-04 DIAGNOSIS — I10 ESSENTIAL HYPERTENSION: ICD-10-CM

## 2021-11-04 DIAGNOSIS — F43.21 GRIEF AT LOSS OF CHILD: ICD-10-CM

## 2021-11-04 DIAGNOSIS — R51.9 MORNING HEADACHE: ICD-10-CM

## 2021-11-04 DIAGNOSIS — R07.9 CHEST PAIN, UNSPECIFIED TYPE: ICD-10-CM

## 2021-11-04 DIAGNOSIS — Z63.4 GRIEF AT LOSS OF CHILD: ICD-10-CM

## 2021-11-04 PROCEDURE — 93000 ELECTROCARDIOGRAM COMPLETE: CPT | Performed by: NURSE PRACTITIONER

## 2021-11-04 PROCEDURE — 99213 OFFICE O/P EST LOW 20 MIN: CPT | Performed by: NURSE PRACTITIONER

## 2021-11-04 PROCEDURE — 71046 X-RAY EXAM CHEST 2 VIEWS: CPT

## 2021-11-04 RX ORDER — ASPIRIN 81 MG/1
81 TABLET, CHEWABLE ORAL DAILY
COMMUNITY

## 2021-11-04 RX ORDER — BUSPIRONE HYDROCHLORIDE 5 MG/1
5 TABLET ORAL 3 TIMES DAILY PRN
Qty: 60 TABLET | Refills: 1 | Status: SHIPPED | OUTPATIENT
Start: 2021-11-04 | End: 2022-03-17 | Stop reason: SDUPTHER

## 2021-11-04 RX ORDER — AMLODIPINE BESYLATE 5 MG/1
10 TABLET ORAL DAILY
Qty: 180 TABLET | Refills: 0 | Status: SHIPPED | OUTPATIENT
Start: 2021-11-04 | End: 2022-01-21 | Stop reason: SDUPTHER

## 2021-11-04 SDOH — SOCIAL STABILITY - SOCIAL INSECURITY: DISSAPEARANCE AND DEATH OF FAMILY MEMBER: Z63.4

## 2021-11-04 NOTE — PROGRESS NOTES
Chief Complaint  Dizziness, Palpitations, Chest Pain, and Headache    Subjective    Patient is a 61 year old female in today with complaints of chest pain, palpitations, morning dizziness and headaches and shortness of breath that has been reoccurring for over a month now. Patient denies congestion, runny nose, cough or fever.          Chely Calderón presents to [facility]  Dizziness  This is a new problem. The current episode started more than 1 month ago. The problem occurs intermittently. The problem has been waxing and waning. Associated symptoms include chest pain, headaches, nausea and vertigo. Pertinent negatives include no chills, coughing or fever. She has tried NSAIDs and rest for the symptoms. The treatment provided mild relief.   Palpitations   This is a new problem. The current episode started in the past 7 days. The problem occurs intermittently. Nothing aggravates the symptoms. Associated symptoms include chest pain, dizziness and nausea. Pertinent negatives include no coughing or fever. Risk factors include dyslipidemia, obesity, stress and post menopause.   Chest Pain   This is a new problem. The current episode started in the past 7 days. The problem occurs intermittently. The pain is present in the substernal region. The pain is at a severity of 8/10. The pain is moderate. The quality of the pain is described as pressure and squeezing. The pain does not radiate. Associated symptoms include dizziness, headaches, nausea and palpitations. Pertinent negatives include no cough or fever.   Her past medical history is significant for hypertension.   Headache   This is a new problem. The current episode started more than 1 month ago. The pain is located in the left unilateral region. The pain does not radiate. The quality of the pain is described as aching. The pain is at a severity of 5/10. The pain is moderate. Associated symptoms include dizziness and nausea.  "Pertinent negatives include no coughing or fever. The symptoms are aggravated by bright light, fatigue and emotional stress. She has tried acetaminophen and NSAIDs for the symptoms. The treatment provided mild relief. Her past medical history is significant for hypertension.       Objective   Vital Signs:   /83   Pulse 61   Temp 98.1 °F (36.7 °C) (Oral)   Ht 162.6 cm (64.02\")   Wt 88.6 kg (195 lb 6.4 oz)   SpO2 98%   BMI 33.52 kg/m²     Physical Exam  HENT:      Head: Normocephalic.      Right Ear: Tympanic membrane, ear canal and external ear normal.      Left Ear: Tympanic membrane, ear canal and external ear normal.   Cardiovascular:      Rate and Rhythm: Normal rate and regular rhythm.      Pulses: Normal pulses.      Heart sounds: Normal heart sounds.   Pulmonary:      Effort: Pulmonary effort is normal. No respiratory distress.      Breath sounds: Normal breath sounds. No stridor. No wheezing, rhonchi or rales.   Skin:     General: Skin is warm and dry.   Neurological:      Mental Status: She is alert and oriented to person, place, and time.   Psychiatric:         Mood and Affect: Mood is depressed. Affect is tearful.         Behavior: Behavior is cooperative.        Result Review :                 Assessment and Plan    Diagnoses and all orders for this visit:    1. Chest pain, unspecified type (Primary)  -     ECG 12 Lead  -     XR Chest PA & Lateral; Future    2. Palpitation  -     ECG 12 Lead    3. Morning headache  -     Ambulatory Referral to Sleep Medicine    4. Essential hypertension  -     amLODIPine (NORVASC) 5 MG tablet; Take 2 tablets by mouth Daily.  Dispense: 180 tablet; Refill: 0    5. Shortness of breath  -     XR Chest PA & Lateral; Future        Follow Up   Return in about 3 months (around 2/4/2022), or if symptoms worsen or fail to improve.  Patient was given instructions and counseling regarding her condition or for health maintenance advice. Please see specific information " pulled into the AVS if appropriate.

## 2021-11-08 NOTE — PROGRESS NOTES
Procedure     ECG 12 Lead    Date/Time: 11/4/2021 8:23 AM  Performed by: Vidal Sierra MD  Authorized by: Vidal Sierra MD   Comparison: not compared with previous ECG   Rhythm: sinus rhythm  Rate: normal  Conduction: conduction normal  ST Segments: ST segments normal  T Waves: T waves normal  QRS axis: left    Clinical impression: normal ECG              Ebony Soto / Lab

## 2021-12-01 ENCOUNTER — OFFICE VISIT (OUTPATIENT)
Dept: CARDIOLOGY | Facility: CLINIC | Age: 61
End: 2021-12-01

## 2021-12-01 ENCOUNTER — TELEPHONE (OUTPATIENT)
Dept: FAMILY MEDICINE CLINIC | Age: 61
End: 2021-12-01

## 2021-12-01 VITALS
HEART RATE: 57 BPM | SYSTOLIC BLOOD PRESSURE: 138 MMHG | BODY MASS INDEX: 33.8 KG/M2 | DIASTOLIC BLOOD PRESSURE: 82 MMHG | WEIGHT: 198 LBS | HEIGHT: 64 IN

## 2021-12-01 DIAGNOSIS — I10 ESSENTIAL HYPERTENSION: Primary | ICD-10-CM

## 2021-12-01 DIAGNOSIS — M72.2 PLANTAR FASCIITIS: Primary | ICD-10-CM

## 2021-12-01 DIAGNOSIS — E78.2 MIXED HYPERLIPIDEMIA: ICD-10-CM

## 2021-12-01 DIAGNOSIS — R07.89 CHEST PAIN, ATYPICAL: ICD-10-CM

## 2021-12-01 PROCEDURE — 99213 OFFICE O/P EST LOW 20 MIN: CPT | Performed by: INTERNAL MEDICINE

## 2021-12-01 RX ORDER — MULTIPLE VITAMINS W/ MINERALS TAB 9MG-400MCG
1 TAB ORAL DAILY
COMMUNITY

## 2021-12-01 NOTE — ASSESSMENT & PLAN NOTE
Atypical symptoms.  Multiple stress test were negative in the past.  She still gets some symptoms but less frequent.  Will monitor for now.  For any frequent or recurrent symptoms, she will be considered for further ischemic evaluation.

## 2021-12-01 NOTE — ASSESSMENT & PLAN NOTE
which is slightly above goal.  Counseled regarding dietary modifications and increased activity.  She has no documented atherosclerotic cardiovascular disease.  Continue Lipitor at the current dose.

## 2021-12-01 NOTE — PROGRESS NOTES
CARDIOLOGY FOLLOW-UP PROGRESS NOTE        Chief Complaint  Follow-up, Hypertension, and Hyperlipidemia, chest pain    Subjective            Chely Calderón presents to [facility]  History of Present Illness    This is a 61-year-old female hypertension, hyperlipidemia, fibromyalgia who is here for a follow-up visit.  She was last in the office in November 2020.  She of patient reports having chest pain, mostly at rest.  Denies any shortness of breath or palpitations.  Her major problem is low back pain currently following up with pain specialist.  She had an ablation procedure done for the nerves recently.  Since last visit, the dose of amlodipine was increased to 10 mg for uncontrolled blood pressure.  She was also diagnosed with the vertigo in the meantime.  No recent hospitalizations or ER visits.    Past History:    (1) Fibromyalgia. (2) Hypertension. (3) Hyperlipidemia. (4) Negative for coronary artery disease or diabetes mellitus.    Medical History:  Past Medical History:   Diagnosis Date   • Arthritis    • Dysuria    • Essential (primary) hypertension    • Family history of malignant neoplasm of breast    • Fever    • Fibromyalgia    • Fibromyalgia, primary 2001   • GERD without esophagitis    • Low back pain    • Mixed hyperlipidemia    • Other fatigue    • Other specified anxiety disorders    • Spontaneous ecchymoses    • Unspecified abdominal pain    • Unspecified lump in the right breast, unspecified quadrant        Surgical History: has a past surgical history that includes Cholecystectomy; Tubal ligation; Endometrial ablation; Cardiac catheterization; and Colonoscopy.     Family History: family history includes Arthritis in her father, mother, and sister; Breast cancer in her sister; Cancer in her sister; Diabetes type II in her mother; Heart disease in her mother; Hyperlipidemia in her mother.     Social History: reports that she quit smoking about 8 years ago. Her  smoking use included cigarettes. She started smoking about 29 years ago. She has a 5.25 pack-year smoking history. She has never used smokeless tobacco. She reports current alcohol use of about 5.0 standard drinks of alcohol per week. She reports that she does not use drugs.    Allergies: Lisinopril    Current Outpatient Medications on File Prior to Visit   Medication Sig   • amLODIPine (NORVASC) 5 MG tablet Take 2 tablets by mouth Daily.   • aspirin 81 MG chewable tablet Chew 81 mg Daily.   • atorvastatin (LIPITOR) 20 MG tablet Take 1 tablet by mouth Daily.   • busPIRone (BUSPAR) 5 MG tablet Take 1 tablet by mouth 3 (Three) Times a Day As Needed (Anxiety).   • carvedilol (COREG) 6.25 MG tablet Take 1 tablet by mouth 2 (two) times a day.   • gabapentin (NEURONTIN) 300 MG capsule Take 1 capsule by mouth 3 (Three) Times a Day.   • hydroCHLOROthiazide (HYDRODIURIL) 25 MG tablet Take 1 tablet by mouth Daily. (Patient taking differently: Take 12.5 mg by mouth Daily.)   • losartan (COZAAR) 100 MG tablet Take 1 tablet by mouth Daily.   • multivitamin with minerals (MULTIVITAMIN ADULT PO) Take 1 tablet by mouth Daily.   • omeprazole (priLOSEC) 40 MG capsule Take 1 capsule by mouth Daily.   • ondansetron (Zofran) 4 MG tablet Take 1 tablet by mouth Every 8 (Eight) Hours As Needed for Nausea or Vomiting. (Patient taking differently: Take 4 mg by mouth As Needed for Nausea or Vomiting.)   • Probiotic Product (PROBIOTIC PO) Take 1 tablet by mouth Daily.   • tiZANidine (Zanaflex) 4 MG tablet Take 1 tablet by mouth Every 6 (Six) Hours As Needed for Muscle Spasms. (Patient taking differently: Take 4 mg by mouth As Needed for Muscle Spasms.)   • traZODone (DESYREL) 50 MG tablet Take 1 tablet by mouth As Needed.     No current facility-administered medications on file prior to visit.          Review of Systems   Respiratory: Negative for cough, shortness of breath and wheezing.    Cardiovascular: Positive for chest pain. Negative for  "palpitations and leg swelling.   Gastrointestinal: Negative for nausea and vomiting.   Musculoskeletal: Positive for arthralgias and back pain.   Neurological: Negative for dizziness and syncope.        Objective     /82   Pulse 57   Ht 162.6 cm (64\")   Wt 89.8 kg (198 lb)   BMI 33.99 kg/m²       Physical Exam    General : Alert, awake, no acute distress  Neck : Supple, no carotid bruit, no jugular venous distention  CVS : Regular rate and rhythm, no murmur, rubs or gallops  Lungs: Clear to auscultation bilaterally, no crackles or rhonchi  Abdomen: Soft, nontender, bowel sounds heard in all 4 quadrants  Extremities: Warm, well-perfused, no pedal edema    Result Review :     The following data was reviewed by: Jesse Wilson MD on 12/01/2021:    CMP    CMP 3/22/21 7/2/21 7/23/21   Glucose 91 82 98   BUN 21 16 18   Creatinine 0.91 (A) 1.37 (A) 1.00   eGFR  Am  48 (A) 69   Sodium 137 142 137   Potassium 3.6 4.1 4.1   Chloride 97 (A) 103 99   Calcium 9.6 9.5 9.8   Albumin 4.9 4.90    Total Bilirubin 0.46 0.6    Alkaline Phosphatase 65 63    AST (SGOT) 23 21    ALT (SGPT) 20 17    (A) Abnormal value            CBC    CBC 2/10/21 3/22/21 3/22/21 7/2/21     1347 1444    WBC   6.05 7.04   RBC NONE SEEN NONE SEEN 4.69 4.68   Hemoglobin   13.40 13.2   Hematocrit   40.5 40.6   MCV   86.4 86.8   MCH   28.6 28.2   MCHC   33.1 32.5   RDW   12.9 13.9   Platelets   336.00 330           TSH    TSH 3/22/21 7/2/21   TSH 2.070 1.850           Lipid Panel    Lipid Panel 3/22/21   Total Cholesterol 175   Triglycerides 82   HDL Cholesterol 54   VLDL Cholesterol 16   LDL Cholesterol  105 (A)   (A) Abnormal value       Comments are available for some flowsheets but are not being displayed.                         Assessment and Plan        Diagnoses and all orders for this visit:    1. Essential hypertension (Primary)  Assessment & Plan:  Blood pressure is reasonably well controlled after the recent medication " adjustment.  Continue current regimen including amlodipine, hydrochlorothiazide, losartan and carvedilol.      2. Mixed hyperlipidemia  Assessment & Plan:   which is slightly above goal.  Counseled regarding dietary modifications and increased activity.  She has no documented atherosclerotic cardiovascular disease.  Continue Lipitor at the current dose.       3. Chest pain, atypical  Assessment & Plan:  Atypical symptoms.  Multiple stress test were negative in the past.  She still gets some symptoms but less frequent.  Will monitor for now.  For any frequent or recurrent symptoms, she will be considered for further ischemic evaluation.              Follow Up     Return in about 1 year (around 12/1/2022) for Recheck, Next scheduled follow up.    Patient was given instructions and counseling regarding her condition or for health maintenance advice. Please see specific information pulled into the AVS if appropriate.

## 2021-12-01 NOTE — ASSESSMENT & PLAN NOTE
Blood pressure is reasonably well controlled after the recent medication adjustment.  Continue current regimen including amlodipine, hydrochlorothiazide, losartan and carvedilol.

## 2022-01-19 ENCOUNTER — OFFICE VISIT (OUTPATIENT)
Dept: PODIATRY | Facility: CLINIC | Age: 62
End: 2022-01-19

## 2022-01-19 VITALS
HEART RATE: 57 BPM | WEIGHT: 202 LBS | SYSTOLIC BLOOD PRESSURE: 131 MMHG | OXYGEN SATURATION: 100 % | BODY MASS INDEX: 34.49 KG/M2 | HEIGHT: 64 IN | DIASTOLIC BLOOD PRESSURE: 71 MMHG | TEMPERATURE: 96.9 F

## 2022-01-19 DIAGNOSIS — M72.2 PLANTAR FASCIITIS: ICD-10-CM

## 2022-01-19 DIAGNOSIS — M79.671 FOOT PAIN, RIGHT: Primary | ICD-10-CM

## 2022-01-19 PROCEDURE — 99203 OFFICE O/P NEW LOW 30 MIN: CPT | Performed by: PODIATRIST

## 2022-01-19 RX ORDER — DICLOFENAC SODIUM 75 MG/1
75 TABLET, DELAYED RELEASE ORAL 2 TIMES DAILY
Qty: 60 TABLET | Refills: 1 | Status: SHIPPED | OUTPATIENT
Start: 2022-01-19 | End: 2022-02-18

## 2022-01-19 RX ORDER — METHYLPREDNISOLONE 4 MG/1
TABLET ORAL
Qty: 21 TABLET | Refills: 0 | Status: SHIPPED | OUTPATIENT
Start: 2022-01-19 | End: 2022-03-17

## 2022-01-19 NOTE — PROGRESS NOTES
[facility] - PODIATRY    Today's Date: 01/19/22    Patient Name: Chely Calderón  MRN: [id number]  CSN: [id number]  PCP: Manoj Ybarra MD, Last PCP Visit: 1 December 2021  Referring Provider: No ref. provider found    SUBJECTIVE     Chief Complaint   Patient presents with   • Right Foot - Pain     HPI: Chely Calderón, a 61 y.o.female, comes to clinic.    New, Established, New Problem: New    Location: Right heel    Duration: Fall 2021    Onset:  gradual    Nature:  achy, sharp, shooting    Stable, worsening, improving: Worsening    Aggravating factors:  Patient describes morning right heel pain as stabbing, burning, or aching. This pain usually subsides throughout the day, however it returns after periods of rest and sitting, when standing back up on their feet, and again the next morning.      Previous Treatment: OTC Advil    Patient denies any fevers, chills, nausea, vomiting, shortness of breath, nor any other constitutional signs nor symptoms.    No other pedal complaints at this time.    Past Medical History:   Diagnosis Date   • Arthritis    • Dysuria    • Essential (primary) hypertension    • Family history of malignant neoplasm of breast    • Fever    • Fibromyalgia    • Fibromyalgia, primary 2001   • Foot pain, right    • GERD without esophagitis    • Low back pain    • Mixed hyperlipidemia    • Other fatigue    • Other specified anxiety disorders    • Spontaneous ecchymoses    • Unspecified abdominal pain    • Unspecified lump in the right breast, unspecified quadrant      Past Surgical History:   Procedure Laterality Date   • CARDIAC CATHETERIZATION     • CHOLECYSTECTOMY     • COLONOSCOPY     • ENDOMETRIAL ABLATION     • TUBAL ABDOMINAL LIGATION       Family History   Problem Relation Age of Onset   • Hyperlipidemia Mother    • Diabetes type II Mother    • Arthritis Mother    • Heart disease Mother    • Breast cancer Sister    • Arthritis Sister    • Cancer Sister    • Arthritis  Father      Social History     Socioeconomic History   • Marital status:    • Number of children: 2   Tobacco Use   • Smoking status: Former Smoker     Packs/day: 0.25     Years: 21.00     Pack years: 5.25     Types: Cigarettes     Start date:      Quit date:      Years since quittin.0   • Smokeless tobacco: Never Used   • Tobacco comment: off and on from her 30s    Vaping Use   • Vaping Use: Never used   Substance and Sexual Activity   • Alcohol use: Yes     Alcohol/week: 5.0 standard drinks     Types: 5 Standard drinks or equivalent per week     Comment: 4-5 times a week   • Drug use: Never   • Sexual activity: Not Currently     Partners: Male     Birth control/protection: None     Allergies   Allergen Reactions   • Lisinopril Cough     Current Outpatient Medications   Medication Sig Dispense Refill   • amLODIPine (NORVASC) 5 MG tablet Take 2 tablets by mouth Daily. 180 tablet 0   • aspirin 81 MG chewable tablet Chew 81 mg Daily.     • atorvastatin (LIPITOR) 20 MG tablet Take 1 tablet by mouth Daily. 90 tablet 3   • busPIRone (BUSPAR) 5 MG tablet Take 1 tablet by mouth 3 (Three) Times a Day As Needed (Anxiety). 60 tablet 1   • carvedilol (COREG) 6.25 MG tablet Take 1 tablet by mouth 2 (two) times a day. 180 tablet 3   • diclofenac (VOLTAREN) 75 MG EC tablet Take 1 tablet by mouth 2 (Two) Times a Day for 30 days. 60 tablet 1   • gabapentin (NEURONTIN) 300 MG capsule Take 1 capsule by mouth 3 (Three) Times a Day. 270 capsule 1   • hydroCHLOROthiazide (HYDRODIURIL) 25 MG tablet Take 1 tablet by mouth Daily. (Patient taking differently: Take 12.5 mg by mouth Daily.) 90 tablet 3   • losartan (COZAAR) 100 MG tablet Take 1 tablet by mouth Daily. 90 tablet 3   • methylPREDNISolone (MEDROL) 4 MG dose pack Take as directed 21 tablet 0   • multivitamin with minerals (MULTIVITAMIN ADULT PO) Take 1 tablet by mouth Daily.     • omeprazole (priLOSEC) 40 MG capsule Take 1 capsule by mouth Daily. 90 capsule 3    • ondansetron (Zofran) 4 MG tablet Take 1 tablet by mouth Every 8 (Eight) Hours As Needed for Nausea or Vomiting. (Patient taking differently: Take 4 mg by mouth As Needed for Nausea or Vomiting.) 10 tablet 0   • Probiotic Product (PROBIOTIC PO) Take 1 tablet by mouth Daily.     • tiZANidine (Zanaflex) 4 MG tablet Take 1 tablet by mouth Every 6 (Six) Hours As Needed for Muscle Spasms. (Patient taking differently: Take 4 mg by mouth As Needed for Muscle Spasms.) 30 tablet 1   • traZODone (DESYREL) 50 MG tablet Take 1 tablet by mouth As Needed.       No current facility-administered medications for this visit.     Review of Systems   Constitutional: Negative.    Musculoskeletal:        Right heel pain   All other systems reviewed and are negative.      OBJECTIVE     Vitals:    01/19/22 1429   BP: 131/71   Pulse: 57   Temp: 96.9 °F (36.1 °C)   SpO2: 100%       PHYSICAL EXAM     Foot/Ankle Exam:       General:   Appearance: appears stated age and healthy    Orientation: AAOx3    Affect: appropriate    Gait: unimpaired      VASCULAR      Right Foot Vascularity   Normal vascular exam    Dorsalis pedis:  2+  Posterior tibial:  2+  Skin Temperature: warm    Edema Grading:  None  CFT:  < 3 seconds  Pedal Hair Growth:  Present  Varicosities: none       Left Foot Vascularity   Normal vascular exam    Dorsalis pedis:  2+  Posterior tibial:  2+  Skin Temperature: warm    Edema Grading:  None  CFT:  < 3 seconds  Pedal Hair Growth:  Present  Varicosities: none        NEUROLOGIC     Right Foot Neurologic   Normal sensation    Light touch sensation:  Normal  Vibratory sensation:  Normal  Hot/Cold sensation: normal    Protective Sensation using [place] Monofilament:  10     Left Foot Neurologic   Normal sensation    Light touch sensation:  Normal  Vibratory sensation:  Normal  Hot/cold sensation: normal    Protective Sensation using [place] Monofilament:  10     MUSCULOSKELETAL      Right Foot Musculoskeletal    Tenderness: plantar fascia and plantar heel       MUSCLE STRENGTH     Right Foot Muscle Strength   Normal strength    Foot dorsiflexion:  5  Foot plantar flexion:  5  Foot inversion:  5  Foot eversion:  5     Left Foot Muscle Strength   Foot dorsiflexion:  5  Foot plantar flexion:  5  Foot inversion:  5  Foot eversion:  5     RANGE OF MOTION      Right Foot Range of Motion   Foot and ankle ROM within normal limits       Left Foot Range of Motion   Foot and ankle ROM within normal limits       DERMATOLOGIC     Right Foot Dermatologic   Skin: skin intact    Nails: normal       Left Foot Dermatologic   Skin: skin intact    Nails: normal       ASSESSMENT/PLAN     Diagnoses and all orders for this visit:    1. Foot pain, right (Primary)    2. Plantar fasciitis  -     methylPREDNISolone (MEDROL) 4 MG dose pack; Take as directed  Dispense: 21 tablet; Refill: 0  -     diclofenac (VOLTAREN) 75 MG EC tablet; Take 1 tablet by mouth 2 (Two) Times a Day for 30 days.  Dispense: 60 tablet; Refill: 1      Comprehensive lower extremity examination and evaluation was performed.    Discussed findings and treatment plan including risks, benefits, and treatment options with patient in detail. Patient agreed with treatment plan.    Treatment Options discussed:  - no treatment at all  - change in shoegear  - change in activities  - RICE therapy  - arch support  - NSAIDs  - PO steroids  - injectable steroids    An After Visit Summary was printed and given to the patient at discharge, including (if requested) any available informative/educational handouts regarding diagnosis, treatment, or medications. All questions were answered to patient/family satisfaction. Should symptoms fail to improve or worsen they agree to call or return to clinic or to go to the Emergency Department. Discussed the importance of following up with any needed screening tests/labs/specialist appointments and any requested follow-up recommended by me today.  Importance of maintaining follow-up discussed and patient accepts that missed appointments can delay diagnosis and potentially lead to worsening of conditions.    Return in about 3 weeks (around 2/9/2022) for If no improvement, x-rays, possible right heel cortisone injection., or sooner if acute issues arise.    This document has been electronically signed by Alvin Harrington DPM on January 19, 2022 14:59 EST

## 2022-01-21 DIAGNOSIS — I10 ESSENTIAL HYPERTENSION: ICD-10-CM

## 2022-01-21 RX ORDER — AMLODIPINE BESYLATE 10 MG/1
10 TABLET ORAL DAILY
Qty: 10 TABLET | Refills: 0 | Status: SHIPPED | OUTPATIENT
Start: 2022-01-21 | End: 2022-01-21 | Stop reason: SDUPTHER

## 2022-01-21 RX ORDER — AMLODIPINE BESYLATE 10 MG/1
10 TABLET ORAL DAILY
Qty: 90 TABLET | Refills: 1 | Status: SHIPPED | OUTPATIENT
Start: 2022-01-21 | End: 2022-03-17 | Stop reason: SDUPTHER

## 2022-01-21 NOTE — TELEPHONE ENCOUNTER
Please let the patient know that I have sent the 10 mg dose to [facility] and to [facility].  Again, I did not send the 5 mg dose.  The new dose is 10 mg daily.  If she was taking 5 mg twice daily, about 12 hours apart, then she may need to cut the medication in half.  I changed to the 10 mg dose because most insurance will not cover 2 of the 5 mg tablets daily.  Thanks

## 2022-01-21 NOTE — TELEPHONE ENCOUNTER
Caller: Chely Calderón    Relationship: Self    Best call back number: [phone]     Requested Prescriptions:   Requested Prescriptions     Pending Prescriptions Disp Refills   • amLODIPine (NORVASC) 5 MG tablet 180 tablet 0     Sig: Take 2 tablets by mouth Daily.        Pharmacy where request should be sent: [facility] - [facility] [address] - [phone] [facility] [phone] FX     Additional details provided by patient: PATIENT IS OUT.  PATIENT IS REQUESTING A SMALL SUPPL[facility]ENT [facility] -     [facility] [place] [address] [place], [place] - [address] - [phone] [facility] [phone]   [phone]    Does the patient have less than a 3 day supply:  [x] Yes  [] No    Dyan Martinez Rep   01/21/22 13:36 EST

## 2022-01-25 ENCOUNTER — TELEPHONE (OUTPATIENT)
Dept: FAMILY MEDICINE CLINIC | Age: 62
End: 2022-01-25

## 2022-01-25 NOTE — TELEPHONE ENCOUNTER
Pt states she was given diclofenac 75mg by Dr Harrington and wanted to make sure this wouldn't interfere with her blood pressure or liver or any of her other medications.

## 2022-01-26 NOTE — TELEPHONE ENCOUNTER
Diclofenac is a fairly potent NSAID.  NSAIDs, including ibuprofen and naproxen, have the potential to cause the blood pressure to go up.  Other potential risks include ulcer, kidney damage, heart attack, and stroke.  These risks are generally very low, and patients typically tolerate the medication well.  However, I would recommend she take it with food.  Additionally, she may want to stop in next week and have her blood pressure rechecked just as a precaution.  Again, I think it is reasonable to use the medication for the relative short-term.  It is not necessarily something that I would want her to take long-term.  Thanks.

## 2022-02-23 DIAGNOSIS — Z79.899 OTHER LONG TERM (CURRENT) DRUG THERAPY: ICD-10-CM

## 2022-02-23 DIAGNOSIS — M54.50 LOW BACK PAIN, UNSPECIFIED BACK PAIN LATERALITY, UNSPECIFIED CHRONICITY, UNSPECIFIED WHETHER SCIATICA PRESENT: ICD-10-CM

## 2022-02-23 RX ORDER — GABAPENTIN 300 MG/1
300 CAPSULE ORAL 3 TIMES DAILY
Qty: 90 CAPSULE | Refills: 0 | Status: SHIPPED | OUTPATIENT
Start: 2022-02-23 | End: 2022-03-17 | Stop reason: SDUPTHER

## 2022-02-23 NOTE — TELEPHONE ENCOUNTER
I have sent a 30-day supply.  We had to see her every 6 months for this.  Please schedule.  Thanks.

## 2022-03-17 ENCOUNTER — OFFICE VISIT (OUTPATIENT)
Dept: FAMILY MEDICINE CLINIC | Age: 62
End: 2022-03-17

## 2022-03-17 ENCOUNTER — LAB (OUTPATIENT)
Dept: LAB | Facility: HOSPITAL | Age: 62
End: 2022-03-17

## 2022-03-17 VITALS
WEIGHT: 196.8 LBS | BODY MASS INDEX: 33.6 KG/M2 | DIASTOLIC BLOOD PRESSURE: 84 MMHG | HEIGHT: 64 IN | TEMPERATURE: 97.2 F | HEART RATE: 71 BPM | OXYGEN SATURATION: 98 % | SYSTOLIC BLOOD PRESSURE: 121 MMHG

## 2022-03-17 DIAGNOSIS — R73.9 HYPERGLYCEMIA: ICD-10-CM

## 2022-03-17 DIAGNOSIS — Z79.899 OTHER LONG TERM (CURRENT) DRUG THERAPY: ICD-10-CM

## 2022-03-17 DIAGNOSIS — E78.2 MIXED HYPERLIPIDEMIA: ICD-10-CM

## 2022-03-17 DIAGNOSIS — M54.50 LOW BACK PAIN, UNSPECIFIED BACK PAIN LATERALITY, UNSPECIFIED CHRONICITY, UNSPECIFIED WHETHER SCIATICA PRESENT: ICD-10-CM

## 2022-03-17 DIAGNOSIS — I10 ESSENTIAL HYPERTENSION: Primary | ICD-10-CM

## 2022-03-17 DIAGNOSIS — Z11.59 NEED FOR HEPATITIS C SCREENING TEST: ICD-10-CM

## 2022-03-17 DIAGNOSIS — F41.8 OTHER SPECIFIED ANXIETY DISORDERS: ICD-10-CM

## 2022-03-17 DIAGNOSIS — I10 ESSENTIAL HYPERTENSION: ICD-10-CM

## 2022-03-17 LAB
ALBUMIN SERPL-MCNC: 4.6 G/DL (ref 3.5–5.2)
ALBUMIN/GLOB SERPL: 1.7 G/DL
ALP SERPL-CCNC: 74 U/L (ref 39–117)
ALT SERPL W P-5'-P-CCNC: 17 U/L (ref 1–33)
ANION GAP SERPL CALCULATED.3IONS-SCNC: 9.9 MMOL/L (ref 5–15)
AST SERPL-CCNC: 18 U/L (ref 1–32)
BILIRUB SERPL-MCNC: 0.3 MG/DL (ref 0–1.2)
BUN SERPL-MCNC: 21 MG/DL (ref 6–20)
BUN/CREAT SERPL: 21.4 (ref 7–25)
CALCIUM SPEC-SCNC: 10 MG/DL (ref 8.6–10.5)
CHLORIDE SERPL-SCNC: 102 MMOL/L (ref 98–107)
CHOLEST SERPL-MCNC: 191 MG/DL (ref 0–200)
CO2 SERPL-SCNC: 27.1 MMOL/L (ref 22–29)
CREAT SERPL-MCNC: 0.98 MG/DL (ref 0.57–1)
DEPRECATED RDW RBC AUTO: 41.9 FL (ref 37–54)
EGFRCR SERPLBLD CKD-EPI 2021: 66.6 ML/MIN/1.73
ERYTHROCYTE [DISTWIDTH] IN BLOOD BY AUTOMATED COUNT: 13.1 % (ref 12.3–15.4)
GLOBULIN UR ELPH-MCNC: 2.7 GM/DL
GLUCOSE SERPL-MCNC: 111 MG/DL (ref 65–99)
HCT VFR BLD AUTO: 39.8 % (ref 34–46.6)
HCV AB SER DONR QL: NORMAL
HDLC SERPL-MCNC: 50 MG/DL (ref 40–60)
HGB BLD-MCNC: 12.7 G/DL (ref 12–15.9)
LDLC SERPL CALC-MCNC: 123 MG/DL (ref 0–100)
LDLC/HDLC SERPL: 2.42 {RATIO}
MCH RBC QN AUTO: 27.9 PG (ref 26.6–33)
MCHC RBC AUTO-ENTMCNC: 31.9 G/DL (ref 31.5–35.7)
MCV RBC AUTO: 87.5 FL (ref 79–97)
PLATELET # BLD AUTO: 323 10*3/MM3 (ref 140–450)
PMV BLD AUTO: 11 FL (ref 6–12)
POTASSIUM SERPL-SCNC: 4.8 MMOL/L (ref 3.5–5.2)
PROT SERPL-MCNC: 7.3 G/DL (ref 6–8.5)
RBC # BLD AUTO: 4.55 10*6/MM3 (ref 3.77–5.28)
SODIUM SERPL-SCNC: 139 MMOL/L (ref 136–145)
TRIGL SERPL-MCNC: 99 MG/DL (ref 0–150)
TSH SERPL DL<=0.05 MIU/L-ACNC: 3.44 UIU/ML (ref 0.27–4.2)
VLDLC SERPL-MCNC: 18 MG/DL (ref 5–40)
WBC NRBC COR # BLD: 9.74 10*3/MM3 (ref 3.4–10.8)

## 2022-03-17 PROCEDURE — 86803 HEPATITIS C AB TEST: CPT

## 2022-03-17 PROCEDURE — 80061 LIPID PANEL: CPT

## 2022-03-17 PROCEDURE — 80053 COMPREHEN METABOLIC PANEL: CPT

## 2022-03-17 PROCEDURE — 99214 OFFICE O/P EST MOD 30 MIN: CPT | Performed by: FAMILY MEDICINE

## 2022-03-17 PROCEDURE — 83036 HEMOGLOBIN GLYCOSYLATED A1C: CPT | Performed by: FAMILY MEDICINE

## 2022-03-17 PROCEDURE — 36415 COLL VENOUS BLD VENIPUNCTURE: CPT

## 2022-03-17 PROCEDURE — 85027 COMPLETE CBC AUTOMATED: CPT

## 2022-03-17 PROCEDURE — 84443 ASSAY THYROID STIM HORMONE: CPT

## 2022-03-17 RX ORDER — GABAPENTIN 300 MG/1
300 CAPSULE ORAL 2 TIMES DAILY
Qty: 180 CAPSULE | Refills: 1 | Status: SHIPPED | OUTPATIENT
Start: 2022-03-17 | End: 2022-09-19

## 2022-03-17 RX ORDER — OMEPRAZOLE 40 MG/1
40 CAPSULE, DELAYED RELEASE ORAL DAILY
Qty: 90 CAPSULE | Refills: 1 | Status: SHIPPED | OUTPATIENT
Start: 2022-03-17 | End: 2022-09-19 | Stop reason: SDUPTHER

## 2022-03-17 RX ORDER — HYDROCHLOROTHIAZIDE 25 MG/1
25 TABLET ORAL DAILY
Qty: 90 TABLET | Refills: 1 | Status: SHIPPED | OUTPATIENT
Start: 2022-03-17 | End: 2022-09-19

## 2022-03-17 RX ORDER — ATORVASTATIN CALCIUM 20 MG/1
20 TABLET, FILM COATED ORAL DAILY
Qty: 90 TABLET | Refills: 1 | Status: SHIPPED | OUTPATIENT
Start: 2022-03-17 | End: 2022-09-19 | Stop reason: SDUPTHER

## 2022-03-17 RX ORDER — CARVEDILOL 6.25 MG/1
6.25 TABLET ORAL 2 TIMES DAILY WITH MEALS
Qty: 180 TABLET | Refills: 1 | Status: SHIPPED | OUTPATIENT
Start: 2022-03-17 | End: 2022-09-19 | Stop reason: SDUPTHER

## 2022-03-17 RX ORDER — BUSPIRONE HYDROCHLORIDE 5 MG/1
5 TABLET ORAL 2 TIMES DAILY PRN
Qty: 180 TABLET | Refills: 1 | Status: SHIPPED | OUTPATIENT
Start: 2022-03-17 | End: 2022-09-19

## 2022-03-17 RX ORDER — AMLODIPINE BESYLATE 10 MG/1
10 TABLET ORAL DAILY
Qty: 90 TABLET | Refills: 1 | Status: SHIPPED | OUTPATIENT
Start: 2022-03-17 | End: 2022-08-23

## 2022-03-17 RX ORDER — LOSARTAN POTASSIUM 100 MG/1
100 TABLET ORAL DAILY
Qty: 90 TABLET | Refills: 1 | Status: SHIPPED | OUTPATIENT
Start: 2022-03-17 | End: 2022-09-19 | Stop reason: SDUPTHER

## 2022-03-17 NOTE — PROGRESS NOTES
Chely Calderón presents to [facility].    Chief Complaint:  Follow up on blood pressure, cholesterol, low back pain    Subjective       History of Present Illness:  Chely is in today for follow up on hypertension.  She is on multiple medications for this as noted below.  We have reviewed those as noted below.  She tolerates these relatively well and her blood pressure is well controlled today.    She also has a history of elevated cholesterol for which she takes generic Lipitor.  She has been on this for some time and is doing relatively well.  She is due for blood work.    Chely is also in for follow up on fibromyalgia and lower back pain for which she remains on gabapentin.  She takes gabapentin regularly - one in the morning and two at night.      Review of Systems:  Review of Systems   Constitutional: Negative for chills and fever.   Respiratory: Negative for cough and shortness of breath.    Cardiovascular: Negative for chest pain and palpitations.   Gastrointestinal: Negative for abdominal pain, nausea and vomiting.        Objective   Medical History:  Past Medical History:   • Arthritis   • Dysuria   • Essential (primary) hypertension   • Family history of malignant neoplasm of breast   • Fever   • Fibromyalgia   • Fibromyalgia, primary   • Foot pain, right   • GERD without esophagitis   • Low back pain   • Mixed hyperlipidemia   • Other fatigue   • Other specified anxiety disorders   • Spontaneous ecchymoses   • Unspecified abdominal pain   • Unspecified lump in the right breast, unspecified quadrant     Past Surgical History:   • CARDIAC CATHETERIZATION   • CHOLECYSTECTOMY   • COLONOSCOPY   • ENDOMETRIAL ABLATION   • TUBAL ABDOMINAL LIGATION      Family History   Problem Relation Age of Onset   • Hyperlipidemia Mother    • Diabetes type II Mother    • Arthritis Mother    • Heart disease Mother    • Breast cancer Sister    • Arthritis Sister    • Cancer Sister    •  Arthritis Father      Social History     Tobacco Use   • Smoking status: Former Smoker     Packs/day: 0.25     Years: 21.00     Pack years: 5.25     Types: Cigarettes     Start date:      Quit date:      Years since quittin.2   • Smokeless tobacco: Never Used   • Tobacco comment: off and on from her 30s    Substance Use Topics   • Alcohol use: Yes     Alcohol/week: 5.0 standard drinks     Types: 5 Standard drinks or equivalent per week     Comment: 4-5 times a week       Health Maintenance Due   Topic Date Due   • TDAP/TD VACCINES (1 - Tdap) Never done   • ZOSTER VACCINE (1 of 2) Never done   • HEPATITIS C SCREENING  Never done   • PAP SMEAR  Never done        Immunization History   Administered Date(s) Administered   • COVID-19 (MODERNA) 1st, 2nd, 3rd Dose Only 2021, 2021, 2021   • COVID-19 (UNSPECIFIED) 2021   • Flu Vaccine Intradermal Quad 18-64YR 10/16/2012, 2016, 2017   • Flublok 18+yrs 10/15/2021   • Influenza Quad Vaccine (Inpatient) 2013   • Influenza, Unspecified 10/22/2020       Allergies   Allergen Reactions   • Lisinopril Cough        Medications:  Current Outpatient Medications on File Prior to Visit   Medication Sig   • aspirin 81 MG chewable tablet Chew 81 mg Daily.   • multivitamin with minerals tablet tablet Take 1 tablet by mouth Daily.   • ondansetron (Zofran) 4 MG tablet Take 1 tablet by mouth Every 8 (Eight) Hours As Needed for Nausea or Vomiting. (Patient taking differently: Take 4 mg by mouth As Needed for Nausea or Vomiting.)   • Probiotic Product (PROBIOTIC PO) Take 1 tablet by mouth Daily.   • tiZANidine (Zanaflex) 4 MG tablet Take 1 tablet by mouth Every 6 (Six) Hours As Needed for Muscle Spasms. (Patient taking differently: Take 4 mg by mouth As Needed for Muscle Spasms.)   • traZODone (DESYREL) 50 MG tablet Take 1 tablet by mouth As Needed.   • [DISCONTINUED] amLODIPine (NORVASC) 10 MG tablet Take 1 tablet by mouth Daily.   •  "[DISCONTINUED] atorvastatin (LIPITOR) 20 MG tablet Take 1 tablet by mouth Daily.   • [DISCONTINUED] busPIRone (BUSPAR) 5 MG tablet Take 1 tablet by mouth 3 (Three) Times a Day As Needed (Anxiety).   • [DISCONTINUED] carvedilol (COREG) 6.25 MG tablet Take 1 tablet by mouth 2 (two) times a day.   • [DISCONTINUED] gabapentin (NEURONTIN) 300 MG capsule Take 1 capsule by mouth 3 (Three) Times a Day.   • [DISCONTINUED] hydroCHLOROthiazide (HYDRODIURIL) 25 MG tablet Take 1 tablet by mouth Daily. (Patient taking differently: Take 12.5 mg by mouth Daily.)   • [DISCONTINUED] losartan (COZAAR) 100 MG tablet Take 1 tablet by mouth Daily.   • [DISCONTINUED] omeprazole (priLOSEC) 40 MG capsule Take 1 capsule by mouth Daily.   • [DISCONTINUED] methylPREDNISolone (MEDROL) 4 MG dose pack Take as directed     No current facility-administered medications on file prior to visit.       Vital Signs:   /84 (BP Location: Right arm, Patient Position: Sitting, Cuff Size: Adult)   Pulse 71   Temp 97.2 °F (36.2 °C) (Oral)   Ht 162.6 cm (64\")   Wt 89.3 kg (196 lb 12.8 oz)   SpO2 98% Comment: Room air  BMI 33.78 kg/m²       Physical Exam:  Physical Exam  Vitals reviewed.   Constitutional:       General: She is not in acute distress.     Appearance: She is not ill-appearing.   Eyes:      Pupils: Pupils are equal, round, and reactive to light.   Neck:      Comments: No thyromegaly  Cardiovascular:      Rate and Rhythm: Normal rate and regular rhythm.   Pulmonary:      Effort: Pulmonary effort is normal.      Breath sounds: Normal breath sounds.   Abdominal:      General: There is no distension.      Palpations: Abdomen is soft.      Tenderness: There is no abdominal tenderness.   Musculoskeletal:      Cervical back: Neck supple.   Lymphadenopathy:      Cervical: No cervical adenopathy.   Skin:     Findings: No lesion or rash.   Neurological:      Mental Status: She is alert.         Result Review      The following data was reviewed by " Manoj Ybarra MD on 03/17/2022.  Lab Results   Component Value Date    WBC 7.04 07/02/2021    HGB 13.2 07/02/2021    HCT 40.6 07/02/2021    MCV 86.8 07/02/2021     07/02/2021     Lab Results   Component Value Date    GLUCOSE 98 07/23/2021    BUN 18 07/23/2021    CREATININE 1.00 07/23/2021     07/23/2021    K 4.1 07/23/2021    CL 99 07/23/2021    CO2 27.4 07/23/2021    CALCIUM 9.8 07/23/2021    PROTEINTOT 7.3 07/02/2021    ALBUMIN 4.90 07/02/2021    ALT 17 07/02/2021    AST 21 07/02/2021    ALKPHOS 63 07/02/2021    BILITOT 0.6 07/02/2021    GLOB 2.4 07/02/2021    AGRATIO 2.0 07/02/2021    BCR 18.0 07/23/2021    ANIONGAP 10.6 07/23/2021      Lab Results   Component Value Date    CHLPL 175 03/22/2021    TRIG 82 03/22/2021    HDL 54 03/22/2021     (H) 03/22/2021     Lab Results   Component Value Date    TSH 1.850 07/02/2021     No results found for: HGBA1C           Assessment and Plan:   Today, we have again reviewed Chely's care.  She is doing fairly well for the most part.  We will refill her needed medications and plan to update labs as noted below.  We discussed her use of gabapentin.  I do think it is reasonable to continue it.  Her back pain has actually been doing somewhat better.  For this reason, we will lower the dose to 1 capsule twice daily.  We will see how things progress in this regard.  We will be back in touch with her after her labs return       Diagnoses and all orders for this visit:    1. Essential hypertension (Primary)  -     amLODIPine (NORVASC) 10 MG tablet; Take 1 tablet by mouth Daily.  Dispense: 90 tablet; Refill: 1  -     carvedilol (COREG) 6.25 MG tablet; Take 1 tablet by mouth 2 (Two) Times a Day With Meals.  Dispense: 180 tablet; Refill: 1  -     losartan (COZAAR) 100 MG tablet; Take 1 tablet by mouth Daily.  Dispense: 90 tablet; Refill: 1  -     hydroCHLOROthiazide (HYDRODIURIL) 25 MG tablet; Take 1 tablet by mouth Daily.  Dispense: 90 tablet; Refill: 1  -      Comprehensive Metabolic Panel; Future    2. Mixed hyperlipidemia  -     atorvastatin (LIPITOR) 20 MG tablet; Take 1 tablet by mouth Daily.  Dispense: 90 tablet; Refill: 1  -     Comprehensive Metabolic Panel; Future  -     Lipid Panel; Future  -     TSH; Future    3. Other specified anxiety disorders  -     busPIRone (BUSPAR) 5 MG tablet; Take 1 tablet by mouth 2 (Two) Times a Day As Needed (Anxiety).  Dispense: 180 tablet; Refill: 1    4. Low back pain, unspecified back pain laterality, unspecified chronicity, unspecified whether sciatica present  -     gabapentin (NEURONTIN) 300 MG capsule; Take 1 capsule by mouth 2 (Two) Times a Day.  Dispense: 180 capsule; Refill: 1    5. Other long term (current) drug therapy  -     gabapentin (NEURONTIN) 300 MG capsule; Take 1 capsule by mouth 2 (Two) Times a Day.  Dispense: 180 capsule; Refill: 1  -     CBC (No Diff); Future    6. Need for hepatitis C screening test  -     Hepatitis C Antibody; Future    Other orders  -     omeprazole (priLOSEC) 40 MG capsule; Take 1 capsule by mouth Daily.  Dispense: 90 capsule; Refill: 1          Follow Up   Return in about 6 months (around 9/17/2022) for Recheck, Medicare Wellness.  Patient was given instructions and counseling regarding her condition or for health maintenance advice. Please see specific information pulled into the AVS if appropriate.

## 2022-03-18 LAB — HBA1C MFR BLD: 5.9 % (ref 4.8–5.6)

## 2022-05-10 ENCOUNTER — TELEMEDICINE (OUTPATIENT)
Dept: FAMILY MEDICINE CLINIC | Age: 62
End: 2022-05-10

## 2022-05-10 ENCOUNTER — TELEPHONE (OUTPATIENT)
Dept: FAMILY MEDICINE CLINIC | Age: 62
End: 2022-05-10

## 2022-05-10 DIAGNOSIS — U07.1 COVID-19: Primary | ICD-10-CM

## 2022-05-10 PROCEDURE — 99213 OFFICE O/P EST LOW 20 MIN: CPT | Performed by: NURSE PRACTITIONER

## 2022-05-10 RX ORDER — METHYLPREDNISOLONE 4 MG/1
TABLET ORAL
Qty: 21 EACH | Refills: 0 | Status: SHIPPED | OUTPATIENT
Start: 2022-05-10 | End: 2022-09-19

## 2022-05-10 NOTE — PROGRESS NOTES
Mode of Visit: Video  Location of patient: home  You have chosen to receive care through a telehealth visit.  Does the patient consent to use a video/audio connection for your medical care today? Yes  The visit included audio and video interaction. No technical issues occurred during this visit.     Chief Complaint  Doximity Video Visit and URI (Pt states that she tested positive for Covid 5/10. Pt c/o fever, body aches, burning in chest./)    Subjective          Chely Calderón presents to [facility]  Tested positive for COVID today at home. Symptoms started Sunday and worsened. Symptoms include: H/A, dizziness, lower back pain, myalgia, fever (101.2), cough, chest burning. She does report some congestion. She denies dyspnea, SOA, and wheezing. She has been vaccinated against COVID and has had booster.     Objective   Vital Signs:   There were no vitals taken for this visit.    Physical Exam   Constitutional: She appears well-developed.   HENT:   Head: Normocephalic.   Neck: Neck normal appearance.  Pulmonary/Chest: Effort normal.   Neurological: She is alert.   Psychiatric: She has a normal mood and affect.     Result Review :     Comprehensive Metabolic Panel (03/17/2022 16:29)               Assessment and Plan    Diagnoses and all orders for this visit:    1. COVID-19 (Primary)  -     methylPREDNISolone (MEDROL) 4 MG dose pack; Take as directed on package instructions.  Dispense: 21 each; Refill: 0  -     Nirmatrelvir & Ritonavir (PAXLOVID) 20 x 150 MG & 10 x 100MG tablet therapy pack tablet; Take 3 tablets by mouth 2 (Two) Times a Day for 5 days.  Dispense: 30 tablet; Refill: 0    We have discussed isolation precautions.  We also discussed the treatment of COVID.  We have discussed Paxlovid, and she is agreeable to take the medication.  We have also discussed symptoms that warrant a visit to the ED.    Follow Up   Return if symptoms worsen or fail to improve.  Patient was given  instructions and counseling regarding her condition or for health maintenance advice. Please see specific information pulled into the AVS if appropriate.

## 2022-05-10 NOTE — TELEPHONE ENCOUNTER
Caller: Chely Calderón    Relationship to patient: Self    Best call back number: [phone]     Patient is needing: PATIENT IS REQUESTING A CALL BACK FROM DR. GUARDADO OR NURSE. SHE STATED THAT SHE TOOK A COVID HOME TEST THIS MORNING AND SHE WAS POSITIVE. SHE WANTS TO KNOW WHAT CAN SHE TAKE FOR IT. PLEASE CALL AND ADVISE.

## 2022-05-10 NOTE — TELEPHONE ENCOUNTER
I am not able to work her in today, but I would recommend she see one of our providers today or tomorrow if possible, possibly over telehealth.  Thanks.

## 2022-05-12 ENCOUNTER — TELEPHONE (OUTPATIENT)
Dept: FAMILY MEDICINE CLINIC | Age: 62
End: 2022-05-12

## 2022-05-12 RX ORDER — DEXTROMETHORPHAN HYDROBROMIDE AND PROMETHAZINE HYDROCHLORIDE 15; 6.25 MG/5ML; MG/5ML
5 SYRUP ORAL 4 TIMES DAILY PRN
Qty: 118 ML | Refills: 1 | Status: SHIPPED | OUTPATIENT
Start: 2022-05-12 | End: 2022-09-19

## 2022-05-12 NOTE — TELEPHONE ENCOUNTER
I sent a prescription for some Phenergan cough syrup for her to [facility].  It will say to use up to 4 times daily, but she should be aware there is risk of sleepiness with it.  If she does not see improvement as expected, she should be seen again.  Thanks.

## 2022-05-12 NOTE — TELEPHONE ENCOUNTER
Caller: Chely Calderón    Relationship: Self    Best call back number: [phone]    What medication are you requesting: COUGH SUPPRESSANT     What are your current symptoms: SEVERE COUGH THAT KEEPS PATIENT UP AT NIGHT, COVID POSITIVE    How long have you been experiencing symptoms: 4 DAYS     Have you had these symptoms before:    [x] Yes  [] No    Have you been treated for these symptoms before:   [x] Yes  [] No    If a prescription is needed, what is your preferred pharmacy and phone number: [facility] [address] [place] [address], [place] [address]  - [phone] [facility] [phone] FX     Additional notes:

## 2022-05-16 ENCOUNTER — TELEPHONE (OUTPATIENT)
Dept: FAMILY MEDICINE CLINIC | Age: 62
End: 2022-05-16

## 2022-05-16 NOTE — TELEPHONE ENCOUNTER
Caller: Chely Calderón    Relationship: Self    Best call back number: [phone]    What is the best time to reach you: ANY    Who are you requesting to speak with (clinical staff, provider,  specific staff member): CLINICAL STAFF    What was the call regarding: PATIENT CALLED WANTING TO SPEAK TO A NURSE ABOUT HER MEDICATION. SHE WAS DIAGNOSED WITH COVID AND WAS INSTRUCTED TO STOP TAKING HER BLOOD PRESSURE MEDICATION AND SHE WOULD LIKE TO KNOW IF SHE CAN START TAKING IT AGAIN    Do you require a callback: YES

## 2022-07-11 ENCOUNTER — IMMUNIZATION (OUTPATIENT)
Dept: FAMILY MEDICINE CLINIC | Age: 62
End: 2022-07-11

## 2022-07-11 DIAGNOSIS — Z23 NEED FOR VACCINATION: Primary | ICD-10-CM

## 2022-07-11 PROCEDURE — 91305 COVID-19 (PFIZER) 12+ YRS: CPT | Performed by: FAMILY MEDICINE

## 2022-07-11 PROCEDURE — 0054A COVID-19 (PFIZER) 12+ YRS: CPT | Performed by: FAMILY MEDICINE

## 2022-08-23 DIAGNOSIS — I10 ESSENTIAL HYPERTENSION: ICD-10-CM

## 2022-08-23 RX ORDER — AMLODIPINE BESYLATE 10 MG/1
TABLET ORAL
Qty: 90 TABLET | Refills: 1 | Status: SHIPPED | OUTPATIENT
Start: 2022-08-23 | End: 2022-09-19 | Stop reason: SDUPTHER

## 2022-09-19 ENCOUNTER — OFFICE VISIT (OUTPATIENT)
Dept: FAMILY MEDICINE CLINIC | Age: 62
End: 2022-09-19

## 2022-09-19 VITALS
DIASTOLIC BLOOD PRESSURE: 66 MMHG | WEIGHT: 195.4 LBS | BODY MASS INDEX: 33.36 KG/M2 | TEMPERATURE: 98.1 F | SYSTOLIC BLOOD PRESSURE: 131 MMHG | HEIGHT: 64 IN | HEART RATE: 66 BPM

## 2022-09-19 DIAGNOSIS — R73.9 HYPERGLYCEMIA: ICD-10-CM

## 2022-09-19 DIAGNOSIS — I10 ESSENTIAL HYPERTENSION: ICD-10-CM

## 2022-09-19 DIAGNOSIS — Z79.899 OTHER LONG TERM (CURRENT) DRUG THERAPY: ICD-10-CM

## 2022-09-19 DIAGNOSIS — Z23 ENCOUNTER FOR IMMUNIZATION: ICD-10-CM

## 2022-09-19 DIAGNOSIS — E78.2 MIXED HYPERLIPIDEMIA: ICD-10-CM

## 2022-09-19 DIAGNOSIS — Z00.00 PHYSICAL EXAM: Primary | ICD-10-CM

## 2022-09-19 DIAGNOSIS — Z78.0 ASYMPTOMATIC POSTMENOPAUSAL STATE: ICD-10-CM

## 2022-09-19 DIAGNOSIS — Z12.31 BREAST CANCER SCREENING BY MAMMOGRAM: ICD-10-CM

## 2022-09-19 DIAGNOSIS — M54.50 LOW BACK PAIN, UNSPECIFIED BACK PAIN LATERALITY, UNSPECIFIED CHRONICITY, UNSPECIFIED WHETHER SCIATICA PRESENT: ICD-10-CM

## 2022-09-19 DIAGNOSIS — F17.210 NICOTINE DEPENDENCE, CIGARETTES, UNCOMPLICATED: ICD-10-CM

## 2022-09-19 PROCEDURE — 96160 PT-FOCUSED HLTH RISK ASSMT: CPT | Performed by: FAMILY MEDICINE

## 2022-09-19 PROCEDURE — G0008 ADMIN INFLUENZA VIRUS VAC: HCPCS | Performed by: FAMILY MEDICINE

## 2022-09-19 PROCEDURE — G0439 PPPS, SUBSEQ VISIT: HCPCS | Performed by: FAMILY MEDICINE

## 2022-09-19 PROCEDURE — 99214 OFFICE O/P EST MOD 30 MIN: CPT | Performed by: FAMILY MEDICINE

## 2022-09-19 PROCEDURE — 1170F FXNL STATUS ASSESSED: CPT | Performed by: FAMILY MEDICINE

## 2022-09-19 PROCEDURE — 90686 IIV4 VACC NO PRSV 0.5 ML IM: CPT | Performed by: FAMILY MEDICINE

## 2022-09-19 PROCEDURE — 1159F MED LIST DOCD IN RCRD: CPT | Performed by: FAMILY MEDICINE

## 2022-09-19 RX ORDER — AMLODIPINE BESYLATE 10 MG/1
10 TABLET ORAL DAILY
Qty: 90 TABLET | Refills: 3 | Status: SHIPPED | OUTPATIENT
Start: 2022-09-19 | End: 2023-03-27 | Stop reason: SDUPTHER

## 2022-09-19 RX ORDER — OMEPRAZOLE 40 MG/1
40 CAPSULE, DELAYED RELEASE ORAL DAILY
Qty: 90 CAPSULE | Refills: 3 | Status: SHIPPED | OUTPATIENT
Start: 2022-09-19 | End: 2023-03-27 | Stop reason: SDUPTHER

## 2022-09-19 RX ORDER — HYDROCHLOROTHIAZIDE 25 MG/1
25 TABLET ORAL DAILY
Qty: 90 TABLET | Refills: 3 | Status: SHIPPED | OUTPATIENT
Start: 2022-09-19 | End: 2023-03-27 | Stop reason: SDUPTHER

## 2022-09-19 RX ORDER — GABAPENTIN 300 MG/1
300 CAPSULE ORAL
Qty: 90 CAPSULE | Refills: 1 | Status: SHIPPED | OUTPATIENT
Start: 2022-09-19 | End: 2022-12-06 | Stop reason: ALTCHOICE

## 2022-09-19 RX ORDER — CARVEDILOL 6.25 MG/1
6.25 TABLET ORAL 2 TIMES DAILY WITH MEALS
Qty: 180 TABLET | Refills: 3 | Status: SHIPPED | OUTPATIENT
Start: 2022-09-19 | End: 2023-02-23

## 2022-09-19 RX ORDER — LOSARTAN POTASSIUM 100 MG/1
100 TABLET ORAL DAILY
Qty: 90 TABLET | Refills: 3 | Status: SHIPPED | OUTPATIENT
Start: 2022-09-19 | End: 2023-03-27 | Stop reason: SDUPTHER

## 2022-09-19 RX ORDER — ATORVASTATIN CALCIUM 20 MG/1
20 TABLET, FILM COATED ORAL DAILY
Qty: 90 TABLET | Refills: 3 | Status: SHIPPED | OUTPATIENT
Start: 2022-09-19 | End: 2023-03-27 | Stop reason: SDUPTHER

## 2022-09-19 NOTE — PROGRESS NOTES
The ABCs of the Annual Wellness Visit  Subsequent Medicare Wellness Visit    Chief Complaint:  Medicare wellness exam, blood pressure, cholesterol    Subjective    History of Present Illness:  Chely Calderón is a 62 y.o. female who presents for a Subsequent Medicare Wellness Visit.    The following portions of the patient's history were reviewed and updated as appropriate: allergies, current medications, past family history, past medical history, past social history, past surgical history and problem list. {Optional Link Review (Popup) :23}    Compared to one year ago, the patient feels her physical health is better.  She had issues with her back last year.    Compared to one year ago, the patient feels her mental health is better.    Recent Hospitalizations:  She was not admitted to the hospital during the last year.       Current Medical Providers:  Patient Care Team:  Manoj Ybarra MD as PCP - General (Family Medicine)    Outpatient Medications Prior to Visit   Medication Sig Dispense Refill   • aspirin 81 MG chewable tablet Chew 81 mg Daily.     • multivitamin with minerals tablet tablet Take 1 tablet by mouth Daily.     • Probiotic Product (PROBIOTIC PO) Take 1 tablet by mouth Daily.     • amLODIPine (NORVASC) 10 MG tablet TAKE 1 TABLET EVERY DAY (CHANGE IN STRENGTH) 90 tablet 1   • atorvastatin (LIPITOR) 20 MG tablet Take 1 tablet by mouth Daily. 90 tablet 1   • busPIRone (BUSPAR) 5 MG tablet Take 1 tablet by mouth 2 (Two) Times a Day As Needed (Anxiety). 180 tablet 1   • carvedilol (COREG) 6.25 MG tablet Take 1 tablet by mouth 2 (Two) Times a Day With Meals. 180 tablet 1   • escitalopram (Lexapro) 10 MG tablet Take 1 tablet by mouth Daily. 30 tablet 1   • gabapentin (NEURONTIN) 300 MG capsule Take 1 capsule by mouth 2 (Two) Times a Day. 180 capsule 1   • hydroCHLOROthiazide (HYDRODIURIL) 25 MG tablet Take 1 tablet by mouth Daily. 90 tablet 1   • losartan (COZAAR) 100 MG tablet Take 1 tablet by  mouth Daily. 90 tablet 1   • methylPREDNISolone (MEDROL) 4 MG dose pack Take as directed on package instructions. 21 each 0   • omeprazole (priLOSEC) 40 MG capsule Take 1 capsule by mouth Daily. 90 capsule 1   • ondansetron (Zofran) 4 MG tablet Take 1 tablet by mouth Every 8 (Eight) Hours As Needed for Nausea or Vomiting. (Patient taking differently: Take 4 mg by mouth As Needed for Nausea or Vomiting.) 10 tablet 0   • promethazine-dextromethorphan (PROMETHAZINE-DM) 6.25-15 MG/5ML syrup Take 5 mL by mouth 4 (Four) Times a Day As Needed for Cough. 118 mL 1   • tiZANidine (Zanaflex) 4 MG tablet Take 1 tablet by mouth Every 6 (Six) Hours As Needed for Muscle Spasms. (Patient taking differently: Take 4 mg by mouth As Needed for Muscle Spasms.) 30 tablet 1   • traZODone (DESYREL) 50 MG tablet Take 1 tablet by mouth As Needed.       No facility-administered medications prior to visit.       No opioid medication identified on active medication list. I have reviewed chart for other potential  high risk medication/s and harmful drug interactions in the elderly.          Aspirin is on active medication list. Aspirin use is indicated based on review of current medical condition/s. Pros and cons of this therapy have been discussed today. Benefits of this medication outweigh potential harm.  Patient has been encouraged to continue taking this medication.  .      Patient Active Problem List   Diagnosis   • Hair loss   • Mass of axilla, bilateral   • Muscle cramps   • Essential hypertension   • Hyperlipidemia   • Low back pain   • Other long term (current) drug therapy   • Arthritis   • Family history of malignant neoplasm of breast   • Fibromyalgia   • GERD without esophagitis   • Other specified anxiety disorders   • Lumbar radiculopathy   • Chest pain, atypical     Advance Care Planning   Advance Directive is not on file.  ACP discussion was held with the patient during this visit. Patient does not have an advance directive,  "information provided.  Her son, Nir, would make decisions if needed.    In addition, Chely is here for follow-up on her usual care.  She has chronic low back pain for which she has been taking gabapentin.  She has seen improvement with her back over the last year or longer.  At her most recent visit, we decreased the gabapentin to 1 capsule twice daily.  She has not had sciatica or obvious nerve related pain for over a year.  She would be open to possibly decreasing the medication further.  She has had some difficulty with staying on task.  She is not sure if this is related to medication or not.    She also has hypertension and elevated cholesterol for which she remains on treatments as noted above.  Her most recent LDL cholesterol was still somewhat elevated and not quite to goal.  She feels that her blood pressure has been okay recently.  She is not aware of obvious side effects from the medications.    Review of Systems:  Review of Systems   Constitutional: Negative for chills and fever.   Respiratory: Negative for cough and shortness of breath.    Cardiovascular: Negative for chest pain and palpitations.   Gastrointestinal: Negative for abdominal pain, nausea and vomiting.         Objective       Vitals:    09/19/22 1556   BP: 131/66   BP Location: Right arm   Patient Position: Sitting   Pulse: 66   Temp: 98.1 °F (36.7 °C)   TempSrc: Oral   Weight: 88.6 kg (195 lb 6.4 oz)   Height: 162.6 cm (64.02\")     BMI Readings from Last 1 Encounters:   09/19/22 33.52 kg/m²   BMI is above normal parameters. Recommendations include: exercise counseling and nutrition counseling    Does the patient have evidence of cognitive impairment? No    Physical Exam  Vitals and nursing note reviewed.   Constitutional:       General: She is not in acute distress.     Appearance: She is not ill-appearing.   HENT:      Right Ear: Tympanic membrane and ear canal normal.      Left Ear: Tympanic membrane and ear canal normal.      Ears: "      Comments: Hearing is normal bilaterally with forced whisper.     Mouth/Throat:      Mouth: Mucous membranes are moist.      Comments: Pharynx appears normal  Eyes:      Extraocular Movements: Extraocular movements intact.      Pupils: Pupils are equal, round, and reactive to light.      Comments: Binocular vision is 20/20 with correction.   Neck:      Thyroid: No thyromegaly.   Cardiovascular:      Rate and Rhythm: Normal rate and regular rhythm.      Heart sounds: No murmur heard.  Pulmonary:      Effort: Pulmonary effort is normal.      Breath sounds: Normal breath sounds.   Abdominal:      General: There is no distension.      Palpations: Abdomen is soft. There is no mass.      Tenderness: There is no abdominal tenderness.   Musculoskeletal:      Cervical back: Normal range of motion.   Skin:     Findings: No lesion or rash.   Neurological:      General: No focal deficit present.      Mental Status: She is oriented to person, place, and time.      Cranial Nerves: No cranial nerve deficit.   Psychiatric:         Mood and Affect: Mood normal.       The following data was reviewed by Manoj Ybarra MD on 09/19/2022.  Lab Results   Component Value Date    WBC 9.74 03/17/2022    HGB 12.7 03/17/2022    HCT 39.8 03/17/2022    MCV 87.5 03/17/2022     03/17/2022     Lab Results   Component Value Date    GLUCOSE 111 (H) 03/17/2022    BUN 21 (H) 03/17/2022    CREATININE 0.98 03/17/2022     03/17/2022    K 4.8 03/17/2022     03/17/2022    CO2 27.1 03/17/2022    CALCIUM 10.0 03/17/2022    PROTEINTOT 7.3 03/17/2022    ALBUMIN 4.60 03/17/2022    ALT 17 03/17/2022    AST 18 03/17/2022    ALKPHOS 74 03/17/2022    BILITOT 0.3 03/17/2022    GLOB 2.7 03/17/2022    AGRATIO 1.7 03/17/2022    BCR 21.4 03/17/2022    ANIONGAP 9.9 03/17/2022      Lab Results   Component Value Date    CHOL 191 03/17/2022    CHLPL 175 03/22/2021    TRIG 99 03/17/2022    HDL 50 03/17/2022     (H) 03/17/2022     Lab  Results   Component Value Date    TSH 3.440 2022     Lab Results   Component Value Date    HGBA1C 5.90 (H) 2022     No results found for: PSA       HEALTH RISK ASSESSMENT    Smoking Status:  Social History     Tobacco Use   Smoking Status Former Smoker   • Packs/day: 0.25   • Years: 21.00   • Pack years: 5.25   • Types: Cigarettes   • Start date:    • Quit date:    • Years since quittin.7   Smokeless Tobacco Never Used   Tobacco Comment    off and on from her 30s      Alcohol Consumption:  Social History     Substance and Sexual Activity   Alcohol Use Yes   • Alcohol/week: 5.0 standard drinks   • Types: 5 Standard drinks or equivalent per week    Comment: 4-5 times a week     Fall Risk Screen:    MEGAN Fall Risk Assessment was completed, and patient is at LOW risk for falls.Assessment completed on:2022    Depression Screening:  PHQ-2/PHQ-9 Depression Screening 3/17/2022   Retired PHQ-9 Total Score -   Retired Total Score -   Little Interest or Pleasure in Doing Things 1-->several days   Feeling Down, Depressed or Hopeless 1-->several days   Trouble Falling or Staying Asleep, or Sleeping Too Much 2-->more than half the days   Feeling Tired or Having Little Energy 1-->several days   Poor Appetite or Overeating 1-->several days   Feeling Bad about Yourself - or that You are a Failure or Have Let Yourself or Your Family Down 3-->nearly every day   Trouble Concentrating on Things, Such as Reading the Newspaper or Watching Television 1-->several days   Moving or Speaking So Slowly that Other People Could Have Noticed? Or the Opposite - Being So Fidgety 3-->nearly every day   Thoughts that You Would be Better Off Dead or of Hurting Yourself in Some Way 0-->not at all   PHQ-9: Brief Depression Severity Measure Score 13   If You Checked Off Any Problems, How Difficult Have These Problems Made It For You to Do Your Work, Take Care of Things at Home, or Get Along with Other People? somewhat  difficult       Health Habits and Functional and Cognitive Screening:  Functional & Cognitive Status 9/19/2022   Do you have difficulty preparing food and eating? No   Do you have difficulty bathing yourself, getting dressed or grooming yourself? No   Do you have difficulty using the toilet? No   Do you have difficulty moving around from place to place? No   Do you have trouble with steps or getting out of a bed or a chair? No   Current Diet Well Balanced Diet   Dental Exam Not up to date   Eye Exam Up to date   Exercise (times per week) 0 times per week   Current Exercises Include No Regular Exercise   Do you need help using the phone?  No   Are you deaf or do you have serious difficulty hearing?  No   Do you need help with transportation? No   Do you need help shopping? No   Do you need help preparing meals?  No   Do you need help with housework?  No   Do you need help with laundry? No   Do you need help taking your medications? No   Do you need help managing money? No   Do you ever drive or ride in a car without wearing a seat belt? No   Have you felt unusual stress, anger or loneliness in the last month? Yes   Who do you live with? Alone   If you need help, do you have trouble finding someone available to you? No   Have you been bothered in the last four weeks by sexual problems? No   Do you have difficulty concentrating, remembering or making decisions? Yes       Age-appropriate Screening Schedule:  Refer to the list below for future screening recommendations based on patient's age, sex and/or medical conditions. Orders for these recommended tests are listed in the plan section. The patient has been provided with a written plan.    Health Maintenance   Topic Date Due   • TDAP/TD VACCINES (1 - Tdap) Never done   • ZOSTER VACCINE (1 of 2) Never done   • PAP SMEAR  Never done   • INFLUENZA VACCINE  10/01/2022   • MAMMOGRAM  02/08/2023   • LIPID PANEL  03/17/2023                       Assessment and Plan:       CMS  Preventative Services Quick Reference  Risk Factors Identified During Encounter  Cardiovascular Disease  Immunizations Discussed/Encouraged (specific Immunizations; Tdap, Influenza, Shingrix and COVID19  Obesity/Overweight     The above risks/problems have been discussed with the patient.  Follow up actions/plans if indicated are seen below in the Assessment/Plan Section.  Pertinent information has been shared with the patient in the After Visit Summary.    Today, we have reviewed her care.  With regard to the wellness exam, we will make arrangements for mammogram and bone density.  She is up-to-date on colonoscopy.  We will give her a flu shot today and recommend COVID-19 vaccine when available.  We will reach out to her at that time.  She may want to consider a tetanus booster were she to have a cut or scrape, particularly with dinh metal.  She also might want to ask her pharmacy about the shingles vaccine.    In addition, we have reviewed her usual care.  Her back is doing better than it was a year ago.  She was able to successfully decrease the gabapentin to twice daily dosing without significant impact.  Because of this, we will lower her dose to 1 capsule nightly and see how she does.  If she is able to tolerate that fairly well, then she might consider stopping the medication completely in a few weeks.  We also reviewed her other problems.  Her blood pressure is fairly well controlled, and we will refill her medications.  Her LDL cholesterol was not quite to goal (less than 100) when it was checked earlier in the year.  We will ask her to stop back in for fasting blood work at her convenience.  I would like to recheck that in a couple of other things.  Otherwise, we will plan to see her back in about 6 months.  Having said that, we probably could see her in the year if she is able to stop gabapentin.    Diagnoses and all orders for this visit:    1. Physical exam (Primary)    2. Essential hypertension  -      amLODIPine (NORVASC) 10 MG tablet; Take 1 tablet by mouth Daily.  Dispense: 90 tablet; Refill: 3  -     carvedilol (COREG) 6.25 MG tablet; Take 1 tablet by mouth 2 (Two) Times a Day With Meals.  Dispense: 180 tablet; Refill: 3  -     hydroCHLOROthiazide (HYDRODIURIL) 25 MG tablet; Take 1 tablet by mouth Daily.  Dispense: 90 tablet; Refill: 3  -     losartan (COZAAR) 100 MG tablet; Take 1 tablet by mouth Daily.  Dispense: 90 tablet; Refill: 3  -     Comprehensive Metabolic Panel; Future    3. Mixed hyperlipidemia  -     atorvastatin (LIPITOR) 20 MG tablet; Take 1 tablet by mouth Daily.  Dispense: 90 tablet; Refill: 3  -     Comprehensive Metabolic Panel; Future  -     Lipid Panel; Future    4. Low back pain, unspecified back pain laterality, unspecified chronicity, unspecified whether sciatica present  -     gabapentin (NEURONTIN) 300 MG capsule; Take 1 capsule by mouth every night at bedtime.  Dispense: 90 capsule; Refill: 1    5. Other long term (current) drug therapy  -     gabapentin (NEURONTIN) 300 MG capsule; Take 1 capsule by mouth every night at bedtime.  Dispense: 90 capsule; Refill: 1    6. Breast cancer screening by mammogram  -     Mammo Screening Digital Tomosynthesis Bilateral With CAD; Future    7. Nicotine dependence, cigarettes, uncomplicated    8. Asymptomatic postmenopausal state  -     DEXA Bone Density Axial; Future    9. Hyperglycemia  -     Hemoglobin A1c; Future    10. Encounter for immunization  -     FluLaval/Fluzone >6 mos ([id number])    Other orders  -     omeprazole (priLOSEC) 40 MG capsule; Take 1 capsule by mouth Daily.  Dispense: 90 capsule; Refill: 3        Follow Up:   Return in about 6 months (around 3/19/2023) for Recheck.     An After Visit Summary and PPPS were given to the patient.

## 2022-09-29 ENCOUNTER — TELEPHONE (OUTPATIENT)
Dept: FAMILY MEDICINE CLINIC | Age: 62
End: 2022-09-29

## 2022-09-30 ENCOUNTER — IMMUNIZATION (OUTPATIENT)
Dept: FAMILY MEDICINE CLINIC | Age: 62
End: 2022-09-30

## 2022-09-30 ENCOUNTER — LAB (OUTPATIENT)
Dept: LAB | Facility: HOSPITAL | Age: 62
End: 2022-09-30

## 2022-09-30 DIAGNOSIS — I10 ESSENTIAL HYPERTENSION: ICD-10-CM

## 2022-09-30 DIAGNOSIS — Z23 NEED FOR VACCINATION: Primary | ICD-10-CM

## 2022-09-30 DIAGNOSIS — E78.2 MIXED HYPERLIPIDEMIA: ICD-10-CM

## 2022-09-30 DIAGNOSIS — R73.9 HYPERGLYCEMIA: ICD-10-CM

## 2022-09-30 LAB
ALBUMIN SERPL-MCNC: 4.7 G/DL (ref 3.5–5.2)
ALBUMIN/GLOB SERPL: 2 G/DL
ALP SERPL-CCNC: 68 U/L (ref 39–117)
ALT SERPL W P-5'-P-CCNC: 13 U/L (ref 1–33)
ANION GAP SERPL CALCULATED.3IONS-SCNC: 11 MMOL/L (ref 5–15)
AST SERPL-CCNC: 18 U/L (ref 1–32)
BILIRUB SERPL-MCNC: 0.5 MG/DL (ref 0–1.2)
BUN SERPL-MCNC: 22 MG/DL (ref 8–23)
BUN/CREAT SERPL: 22.7 (ref 7–25)
CALCIUM SPEC-SCNC: 9.7 MG/DL (ref 8.6–10.5)
CHLORIDE SERPL-SCNC: 102 MMOL/L (ref 98–107)
CHOLEST SERPL-MCNC: 196 MG/DL (ref 0–200)
CO2 SERPL-SCNC: 27 MMOL/L (ref 22–29)
CREAT SERPL-MCNC: 0.97 MG/DL (ref 0.57–1)
EGFRCR SERPLBLD CKD-EPI 2021: 67 ML/MIN/1.73
GLOBULIN UR ELPH-MCNC: 2.3 GM/DL
GLUCOSE SERPL-MCNC: 109 MG/DL (ref 65–99)
HBA1C MFR BLD: 5.3 % (ref 4.8–5.6)
HDLC SERPL-MCNC: 53 MG/DL (ref 40–60)
LDLC SERPL CALC-MCNC: 123 MG/DL (ref 0–100)
LDLC/HDLC SERPL: 2.28 {RATIO}
POTASSIUM SERPL-SCNC: 3.8 MMOL/L (ref 3.5–5.2)
PROT SERPL-MCNC: 7 G/DL (ref 6–8.5)
SODIUM SERPL-SCNC: 140 MMOL/L (ref 136–145)
TRIGL SERPL-MCNC: 111 MG/DL (ref 0–150)
VLDLC SERPL-MCNC: 20 MG/DL (ref 5–40)

## 2022-09-30 PROCEDURE — 80061 LIPID PANEL: CPT

## 2022-09-30 PROCEDURE — 83036 HEMOGLOBIN GLYCOSYLATED A1C: CPT

## 2022-09-30 PROCEDURE — 80053 COMPREHEN METABOLIC PANEL: CPT

## 2022-09-30 PROCEDURE — 0124A COVID-19 (PFIZER) BIVALENT BOOSTER 12+YRS: CPT | Performed by: FAMILY MEDICINE

## 2022-09-30 PROCEDURE — 91312 COVID-19 (PFIZER) BIVALENT BOOSTER 12+YRS: CPT | Performed by: FAMILY MEDICINE

## 2022-09-30 PROCEDURE — 36415 COLL VENOUS BLD VENIPUNCTURE: CPT

## 2022-11-11 ENCOUNTER — HOSPITAL ENCOUNTER (OUTPATIENT)
Dept: BONE DENSITY | Facility: HOSPITAL | Age: 62
Discharge: HOME | End: 2022-11-11

## 2022-11-11 ENCOUNTER — HOSPITAL ENCOUNTER (OUTPATIENT)
Dept: MAMMOGRAPHY | Facility: HOSPITAL | Age: 62
Discharge: HOME | End: 2022-11-11

## 2022-11-11 DIAGNOSIS — Z78.0 ASYMPTOMATIC POSTMENOPAUSAL STATE: ICD-10-CM

## 2022-11-11 DIAGNOSIS — Z12.31 BREAST CANCER SCREENING BY MAMMOGRAM: ICD-10-CM

## 2022-11-11 PROCEDURE — 77063 BREAST TOMOSYNTHESIS BI: CPT

## 2022-11-11 PROCEDURE — 77067 SCR MAMMO BI INCL CAD: CPT

## 2022-11-11 PROCEDURE — 77080 DXA BONE DENSITY AXIAL: CPT

## 2022-11-28 ENCOUNTER — OFFICE VISIT (OUTPATIENT)
Dept: FAMILY MEDICINE CLINIC | Age: 62
End: 2022-11-28

## 2022-11-28 VITALS
BODY MASS INDEX: 32.4 KG/M2 | HEIGHT: 64 IN | HEART RATE: 73 BPM | SYSTOLIC BLOOD PRESSURE: 138 MMHG | OXYGEN SATURATION: 96 % | TEMPERATURE: 98.1 F | DIASTOLIC BLOOD PRESSURE: 80 MMHG | WEIGHT: 189.8 LBS

## 2022-11-28 DIAGNOSIS — R51.9 ACUTE NONINTRACTABLE HEADACHE, UNSPECIFIED HEADACHE TYPE: ICD-10-CM

## 2022-11-28 DIAGNOSIS — J01.10 ACUTE NON-RECURRENT FRONTAL SINUSITIS: Primary | ICD-10-CM

## 2022-11-28 DIAGNOSIS — R42 VERTIGO: ICD-10-CM

## 2022-11-28 LAB
EXPIRATION DATE: NORMAL
FLUAV AG UPPER RESP QL IA.RAPID: NOT DETECTED
FLUBV AG UPPER RESP QL IA.RAPID: NOT DETECTED
INTERNAL CONTROL: NORMAL
Lab: NORMAL
SARS-COV-2 AG UPPER RESP QL IA.RAPID: NOT DETECTED

## 2022-11-28 PROCEDURE — 99213 OFFICE O/P EST LOW 20 MIN: CPT | Performed by: PHYSICIAN ASSISTANT

## 2022-11-28 PROCEDURE — 87428 SARSCOV & INF VIR A&B AG IA: CPT | Performed by: PHYSICIAN ASSISTANT

## 2022-11-28 RX ORDER — MECLIZINE HYDROCHLORIDE 25 MG/1
25 TABLET ORAL 3 TIMES DAILY PRN
Qty: 21 TABLET | Refills: 0 | Status: SHIPPED | OUTPATIENT
Start: 2022-11-28 | End: 2022-12-06

## 2022-11-28 RX ORDER — DOXYCYCLINE HYCLATE 100 MG/1
100 CAPSULE ORAL 2 TIMES DAILY
Qty: 20 CAPSULE | Refills: 0 | Status: SHIPPED | OUTPATIENT
Start: 2022-11-28 | End: 2022-12-08

## 2022-11-28 NOTE — PROGRESS NOTES
"Subjective     CHIEF COMPLAINT    Chief Complaint   Patient presents with   • Ear Fullness     (B) (L) side is worse. Pt states congestion is down in her chest. Ongoing for a week.    • Headache            History of Present Illness  This is a 62-year-old female presenting to the clinic complaining of bilateral ear fullness for the last week.  She also has had cough, headache, and sore throat.  She had 1 morning of dizziness which she states felt similar to previous episodes of vertigo and is described as a movement or spinning sensation.  This has improved fairly significantly.  She was around her grandson recently who is also sick with a \"bad cold.\"            Review of Systems   Constitutional: Negative for chills, fatigue and fever.   HENT: Positive for congestion, ear pain and sore throat. Negative for rhinorrhea.    Respiratory: Positive for cough. Negative for shortness of breath and wheezing.    Cardiovascular: Negative for chest pain.   Gastrointestinal: Negative for abdominal pain, diarrhea, nausea and vomiting.   Musculoskeletal: Negative for myalgias.   Skin: Negative for rash.   Neurological: Positive for dizziness (similar to previous episodes of vertigo) and headaches (\"Left sided scalp\").            Past Medical History:   Diagnosis Date   • Arthritis    • Dysuria    • Essential (primary) hypertension    • Family history of malignant neoplasm of breast    • Fever    • Fibromyalgia    • Fibromyalgia, primary 2001   • Foot pain, right    • GERD without esophagitis    • Low back pain    • Mixed hyperlipidemia    • Other fatigue    • Other specified anxiety disorders    • Spontaneous ecchymoses    • Unspecified abdominal pain    • Unspecified lump in the right breast, unspecified quadrant             Past Surgical History:   Procedure Laterality Date   • CARDIAC CATHETERIZATION     • CHOLECYSTECTOMY     • COLONOSCOPY     • ENDOMETRIAL ABLATION     • TUBAL ABDOMINAL LIGATION              Family History "   Problem Relation Age of Onset   • Hyperlipidemia Mother    • Diabetes type II Mother    • Arthritis Mother    • Heart disease Mother    • Breast cancer Sister    • Arthritis Sister    • Cancer Sister    • Arthritis Father             Social History     Socioeconomic History   • Marital status:    • Number of children: 2   Tobacco Use   • Smoking status: Former     Packs/day: 0.25     Years: 21.00     Pack years: 5.25     Types: Cigarettes     Start date:      Quit date:      Years since quittin.9   • Smokeless tobacco: Never   • Tobacco comments:     off and on from her 30s    Vaping Use   • Vaping Use: Never used   Substance and Sexual Activity   • Alcohol use: Yes     Alcohol/week: 5.0 standard drinks     Types: 5 Standard drinks or equivalent per week     Comment: 4-5 times a week   • Drug use: Never   • Sexual activity: Not Currently     Partners: Male     Birth control/protection: None            Allergies   Allergen Reactions   • Lisinopril Cough            Current Outpatient Medications on File Prior to Visit   Medication Sig Dispense Refill   • amLODIPine (NORVASC) 10 MG tablet Take 1 tablet by mouth Daily. 90 tablet 3   • aspirin 81 MG chewable tablet Chew 81 mg Daily.     • atorvastatin (LIPITOR) 20 MG tablet Take 1 tablet by mouth Daily. 90 tablet 3   • carvedilol (COREG) 6.25 MG tablet Take 1 tablet by mouth 2 (Two) Times a Day With Meals. 180 tablet 3   • hydroCHLOROthiazide (HYDRODIURIL) 25 MG tablet Take 1 tablet by mouth Daily. 90 tablet 3   • losartan (COZAAR) 100 MG tablet Take 1 tablet by mouth Daily. 90 tablet 3   • multivitamin with minerals tablet tablet Take 1 tablet by mouth Daily.     • omeprazole (priLOSEC) 40 MG capsule Take 1 capsule by mouth Daily. 90 capsule 3   • Probiotic Product (PROBIOTIC PO) Take 1 tablet by mouth Daily.     • gabapentin (NEURONTIN) 300 MG capsule Take 1 capsule by mouth every night at bedtime. 90 capsule 1     No current facility-administered  "medications on file prior to visit.            /80 (BP Location: Right arm, Patient Position: Sitting, Cuff Size: Small Adult)   Pulse 73   Temp 98.1 °F (36.7 °C) (Oral)   Ht 162.6 cm (64.02\")   Wt 86.1 kg (189 lb 12.8 oz)   SpO2 96% Comment: room air  BMI 32.56 kg/m²          Objective     Physical Exam  Vitals and nursing note reviewed.   Constitutional:       General: She is not in acute distress.     Appearance: Normal appearance.   HENT:      Head: Normocephalic and atraumatic.      Right Ear: Ear canal and external ear normal. A middle ear effusion is present. Tympanic membrane is not injected or erythematous.      Left Ear: Tympanic membrane, ear canal and external ear normal.      Nose: No congestion or rhinorrhea.      Mouth/Throat:      Mouth: Mucous membranes are moist.      Pharynx: Oropharynx is clear. Posterior oropharyngeal erythema present.   Eyes:      Extraocular Movements: Extraocular movements intact.      Conjunctiva/sclera: Conjunctivae normal.      Pupils: Pupils are equal, round, and reactive to light.   Cardiovascular:      Rate and Rhythm: Normal rate and regular rhythm.      Heart sounds: Normal heart sounds.   Pulmonary:      Effort: Pulmonary effort is normal. No respiratory distress.      Breath sounds: Normal breath sounds. No wheezing, rhonchi or rales.   Musculoskeletal:      Cervical back: Normal range of motion. No rigidity.   Skin:     General: Skin is warm and dry.   Neurological:      Mental Status: She is alert and oriented to person, place, and time.      Cranial Nerves: Cranial nerves 2-12 are intact.      Motor: Motor function is intact. No weakness (5/5  strength, dorsiflexion, and pedal flexion b/l).      Gait: Gait is intact.      Comments:      Psychiatric:         Mood and Affect: Mood normal.         Behavior: Behavior normal.              Procedures                    Lab Results (last 24 hours)     Procedure Component Value Units Date/Time    POCT " SARS-CoV-2 Antigen FLEX + Flu [[id number]] Collected: 11/28/22 0923    Specimen: Swab Updated: 11/28/22 0929     SARS Antigen Not Detected     Influenza A Antigen FLEX Not Detected     Influenza B Antigen FLEX Not Detected     Internal Control Passed     Lot Number [id number]     Expiration Date 8/25/23                No Radiology Exams Resulted Within Past 24 Hours             Neuro exam and history of vertigo reassuring during visit today.  We will call patient in 2 days for status check.  If no improvement with dizziness and headache to consider CT scan.  If symptoms are worsening patient should go to ER.  Also cautioned to monitor for rash and contact the office if she develops rash.  Left-sided pain of scalp and ear concerning for potential early shingles.       Diagnoses and all orders for this visit:    1. Acute non-recurrent frontal sinusitis (Primary)  -     doxycycline (VIBRAMYCIN) 100 MG capsule; Take 1 capsule by mouth 2 (Two) Times a Day for 10 days.  Dispense: 20 capsule; Refill: 0    2. Vertigo  -     meclizine (ANTIVERT) 25 MG tablet; Take 1 tablet by mouth 3 (Three) Times a Day As Needed for Dizziness for up to 7 days.  Dispense: 21 tablet; Refill: 0    3. Acute nonintractable headache, unspecified headache type  -     POCT SARS-CoV-2 Antigen FLEX + Flu                           FOR FULL DISCHARGE INSTRUCTIONS/COMMENTS/HANDOUTS please see the   AVS

## 2022-11-30 ENCOUNTER — TELEPHONE (OUTPATIENT)
Dept: FAMILY MEDICINE CLINIC | Age: 62
End: 2022-11-30

## 2022-11-30 DIAGNOSIS — R51.9 ACUTE NONINTRACTABLE HEADACHE, UNSPECIFIED HEADACHE TYPE: ICD-10-CM

## 2022-11-30 DIAGNOSIS — R42 DIZZINESS: Primary | ICD-10-CM

## 2022-11-30 NOTE — TELEPHONE ENCOUNTER
Since her symptoms aren't improving much, I'd recommend moving forward with the CT scan to rule out any neurological cause of her dizziness. I'll place the order.

## 2022-11-30 NOTE — TELEPHONE ENCOUNTER
Pt ret'd call and stated she is still having intermittent h/a but in different areas of her head. She does still have some pain behind her eyes, a little nausea and is feeling some light headedness when the meclizine is starting to wear off.

## 2022-11-30 NOTE — TELEPHONE ENCOUNTER
Please call Chely and see how she is feeling today.  We were particularly worried about her headache and dizziness.  If this is not improving with treatment of her sinusitis she may need a CAT scan.  Thanks, [place]

## 2022-12-01 ENCOUNTER — HOSPITAL ENCOUNTER (OUTPATIENT)
Dept: CT IMAGING | Facility: HOSPITAL | Age: 62
Discharge: HOME | End: 2022-12-01
Admitting: PHYSICIAN ASSISTANT

## 2022-12-01 DIAGNOSIS — R51.9 ACUTE NONINTRACTABLE HEADACHE, UNSPECIFIED HEADACHE TYPE: ICD-10-CM

## 2022-12-01 DIAGNOSIS — R42 DIZZINESS: ICD-10-CM

## 2022-12-01 LAB
CREAT BLDA-MCNC: 0.9 MG/DL
EGFRCR SERPLBLD CKD-EPI 2021: 73.3 ML/MIN/1.73

## 2022-12-01 PROCEDURE — 70470 CT HEAD/BRAIN W/O & W/DYE: CPT

## 2022-12-01 PROCEDURE — 82565 ASSAY OF CREATININE: CPT

## 2022-12-01 PROCEDURE — 0 IOPAMIDOL PER 1 ML: Performed by: PHYSICIAN ASSISTANT

## 2022-12-01 RX ADMIN — IOPAMIDOL 50 ML: 755 INJECTION, SOLUTION INTRAVENOUS at 14:41

## 2022-12-05 ENCOUNTER — TELEPHONE (OUTPATIENT)
Dept: FAMILY MEDICINE CLINIC | Age: 62
End: 2022-12-05

## 2022-12-05 NOTE — TELEPHONE ENCOUNTER
Caller: Chely Calderón    Relationship: Self    Best call back number: [phone]    Who are you requesting to speak with (clinical staff, provider,  specific staff member): CLINICAL    What was the call regarding: PATIENT STATES SHE WOULD LIKE TO KNOW IF HER CAT SCAN RESULTS INDICATE EARLY-ONSET DEMENTIA.    Do you require a callback: YES

## 2022-12-05 NOTE — TELEPHONE ENCOUNTER
Spoke with pt regarding results, pt stated she had intended this go to Dr. Ybarra and not Naty. Pt is wanting pcp's input on this matter

## 2022-12-05 NOTE — TELEPHONE ENCOUNTER
This is not mentioned by the radiologist. Early onset dementia is a complex diagnosis and if she is having concerns about this, I suggest she schedule an appointment with Dr. Ybarra to discuss further. Thanks, [place]

## 2022-12-05 NOTE — TELEPHONE ENCOUNTER
Caller: Chely Calderón    Relationship to patient: Self    Best call back number: [phone]    Patient is needing: PATIENT CALLED IN AND WAS RETURNING A CALL TO [facility] AND IS REQUESTING A CALL BACK PLEASE

## 2022-12-06 ENCOUNTER — OFFICE VISIT (OUTPATIENT)
Dept: CARDIOLOGY | Facility: CLINIC | Age: 62
End: 2022-12-06

## 2022-12-06 VITALS
WEIGHT: 194.2 LBS | HEIGHT: 64 IN | SYSTOLIC BLOOD PRESSURE: 121 MMHG | BODY MASS INDEX: 33.16 KG/M2 | DIASTOLIC BLOOD PRESSURE: 66 MMHG | HEART RATE: 71 BPM

## 2022-12-06 DIAGNOSIS — R07.89 CHEST PAIN, ATYPICAL: ICD-10-CM

## 2022-12-06 DIAGNOSIS — E78.2 MIXED HYPERLIPIDEMIA: ICD-10-CM

## 2022-12-06 DIAGNOSIS — I10 ESSENTIAL HYPERTENSION: Primary | ICD-10-CM

## 2022-12-06 PROCEDURE — 99213 OFFICE O/P EST LOW 20 MIN: CPT | Performed by: INTERNAL MEDICINE

## 2022-12-06 NOTE — ASSESSMENT & PLAN NOTE
Longstanding problem, multiple previous stress test were negative for ischemia.  Symptoms are atypical and not related to activities.  We will hold off on doing any further testing at this time.  Encouraged to call us back for worsening symptoms or different kinds of chest pain, especially with activities.

## 2022-12-06 NOTE — ASSESSMENT & PLAN NOTE
Blood pressure is very well controlled in office today and also for multiple recent office visits.  Continue amlodipine, carvedilol, hydrochlorothiazide and losartan at the current dose.

## 2022-12-06 NOTE — PROGRESS NOTES
"  CARDIOLOGY FOLLOW-UP PROGRESS NOTE        Chief Complaint  Hypertension, Palpitations (When she wakes up), and Chest Pain    Subjective            Chely Calderón presents to [facility] CARDIOLOGY  History of Present Illness      Ms Calderón is here for annual follow-up visit.  She continues to report chest pain but the rest and activities, she is mild.  Also reports palpitations described as heart racing whenever she wakes up in the morning.  Her fibromyalgia is not well controlled and she also reports generalized aches and pains.  No major shortness of breath or dizziness.  She had no recent hospitalizations and no ER visits during the past 1 year.  Per patient \"she learned to live with the pain\" .        Past History:    (1) Fibromyalgia. (2) Hypertension. (3) Hyperlipidemia. (4) Negative for coronary artery disease or diabetes mellitus.    Medical History:  Past Medical History:   Diagnosis Date   • Arthritis    • Dysuria    • Essential (primary) hypertension    • Family history of malignant neoplasm of breast    • Fever    • Fibromyalgia    • Fibromyalgia, primary 2001   • Foot pain, right    • GERD without esophagitis    • Low back pain    • Mixed hyperlipidemia    • Other fatigue    • Other specified anxiety disorders    • Spontaneous ecchymoses    • Unspecified abdominal pain    • Unspecified lump in the right breast, unspecified quadrant        Surgical History: has a past surgical history that includes Cholecystectomy; Tubal ligation; Endometrial ablation; Cardiac catheterization; and Colonoscopy.     Family History: family history includes Arthritis in her father, mother, and sister; Breast cancer in her sister; Cancer in her sister; Diabetes type II in her mother; Heart disease in her mother; Hyperlipidemia in her mother.     Social History: reports that she quit smoking about 9 years ago. Her smoking use included cigarettes. She started smoking about 30 years ago. She has a 5.25 " "pack-year smoking history. She has never used smokeless tobacco. She reports current alcohol use of about 5.0 standard drinks per week. She reports that she does not use drugs.    Allergies: Lisinopril    Current Outpatient Medications on File Prior to Visit   Medication Sig   • amLODIPine (NORVASC) 10 MG tablet Take 1 tablet by mouth Daily.   • aspirin 81 MG chewable tablet Chew 81 mg Daily.   • atorvastatin (LIPITOR) 20 MG tablet Take 1 tablet by mouth Daily.   • carvedilol (COREG) 6.25 MG tablet Take 1 tablet by mouth 2 (Two) Times a Day With Meals.   • doxycycline (VIBRAMYCIN) 100 MG capsule Take 1 capsule by mouth 2 (Two) Times a Day for 10 days.   • hydroCHLOROthiazide (HYDRODIURIL) 25 MG tablet Take 1 tablet by mouth Daily.   • losartan (COZAAR) 100 MG tablet Take 1 tablet by mouth Daily.   • meclizine (ANTIVERT) 25 MG tablet Take 1 tablet by mouth 3 (Three) Times a Day As Needed for Dizziness for up to 7 days.   • multivitamin with minerals tablet tablet Take 1 tablet by mouth Daily.   • omeprazole (priLOSEC) 40 MG capsule Take 1 capsule by mouth Daily.   • Probiotic Product (PROBIOTIC PO) Take 1 tablet by mouth Daily.   • gabapentin (NEURONTIN) 300 MG capsule Take 1 capsule by mouth every night at bedtime.         Review of Systems   Respiratory: Negative for cough, shortness of breath and wheezing.    Cardiovascular: Positive for chest pain and palpitations. Negative for leg swelling.   Gastrointestinal: Negative for nausea and vomiting.   Neurological: Negative for dizziness and syncope.        Objective     /66   Pulse 71   Ht 162.6 cm (64\")   Wt 88.1 kg (194 lb 3.2 oz)   BMI 33.33 kg/m²       Physical Exam    General : Alert, awake, no acute distress  Neck : Supple, no carotid bruit, no jugular venous distention  CVS : Regular rate and rhythm, no murmur, rubs or gallops  Lungs: Clear to auscultation bilaterally, no crackles or rhonchi  Abdomen: Soft, nontender, bowel sounds heard in all 4 " quadrants  Extremities: Warm, well-perfused, no pedal edema    Result Review :     The following data was reviewed by: Jesse Wilson MD on 12/06/2022:    CMP    CMP 3/17/22 9/30/22 12/1/22   Glucose 111 (A) 109 (A)    BUN 21 (A) 22    Creatinine 0.98 0.97 0.90   Sodium 139 140    Potassium 4.8 3.8    Chloride 102 102    Calcium 10.0 9.7    Albumin 4.60 4.70    Total Bilirubin 0.3 0.5    Alkaline Phosphatase 74 68    AST (SGOT) 18 18    ALT (SGPT) 17 13    (A) Abnormal value       Comments are available for some flowsheets but are not being displayed.           CBC    CBC 3/17/22   WBC 9.74   RBC 4.55   Hemoglobin 12.7   Hematocrit 39.8   MCV 87.5   MCH 27.9   MCHC 31.9   RDW 13.1   Platelets 323           TSH    TSH 3/17/22   TSH 3.440           Lipid Panel    Lipid Panel 3/17/22 9/30/22   Total Cholesterol 191 196   Triglycerides 99 111   HDL Cholesterol 50 53   VLDL Cholesterol 18 20   LDL Cholesterol  123 (A) 123 (A)   LDL/HDL Ratio 2.42 2.28   (A) Abnormal value                 Data reviewed: Cardiology studies        Echocardiogram done on 6/4/2018 showed    1. Normal left ventricular systolic function with an estimated ejection fraction of 60%   2. Mild concentric left ventricular hypertrophy.   3. Grade 1 diastolic dysfunction of the left ventricle.   4. Findings are consistent with hypertensive heart disease.  5. Mild tricuspid regurgitation with evidence of borderline elevated pulmonary artery pressures.                      Assessment and Plan        Diagnoses and all orders for this visit:    1. Essential hypertension (Primary)  Assessment & Plan:  Blood pressure is very well controlled in office today and also for multiple recent office visits.  Continue amlodipine, carvedilol, hydrochlorothiazide and losartan at the current dose.        2. Mixed hyperlipidemia  Assessment & Plan:  LDL in 120s, recently started on atorvastatin.  Recommend to continue.      3. Chest pain, atypical  Assessment &  Plan:  Longstanding problem, multiple previous stress test were negative for ischemia.  Symptoms are atypical and not related to activities.  We will hold off on doing any further testing at this time.  Encouraged to call us back for worsening symptoms or different kinds of chest pain, especially with activities.              Follow Up     Return in about 6 months (around 6/6/2023) for Next scheduled follow up.    Patient was given instructions and counseling regarding her condition or for health maintenance advice. Please see specific information pulled into the AVS if appropriate.

## 2022-12-06 NOTE — TELEPHONE ENCOUNTER
Please let Chely know that I have reviewed the recent CT scan.  There is no finding present that would point toward some type of early onset dementia.  This is something that we can discuss further at her March appointment.  However, if she would prefer to be scheduled sooner to review things, please make it so.  Let me know if she has other concerns.  Thanks.

## 2023-02-23 DIAGNOSIS — I10 ESSENTIAL HYPERTENSION: ICD-10-CM

## 2023-02-23 RX ORDER — CARVEDILOL 6.25 MG/1
6.25 TABLET ORAL 2 TIMES DAILY
Qty: 180 TABLET | Refills: 1 | Status: SHIPPED | OUTPATIENT
Start: 2023-02-23 | End: 2023-03-27 | Stop reason: SDUPTHER

## 2023-02-23 RX ORDER — CARVEDILOL 6.25 MG/1
6.25 TABLET ORAL 2 TIMES DAILY WITH MEALS
Qty: 180 TABLET | Refills: 3 | Status: CANCELLED | OUTPATIENT
Start: 2023-02-23

## 2023-02-23 NOTE — TELEPHONE ENCOUNTER
Caller: Chely Calderón    Relationship: Self    Best call back number: [phone]    Requested Prescriptions:   Requested Prescriptions     Pending Prescriptions Disp Refills   • carvedilol (COREG) 6.25 MG tablet 180 tablet 3     Sig: Take 1 tablet by mouth 2 (Two) Times a Day With Meals.        Pharmacy where request should be sent: [facility] [id number] [place] - [address] [facility] - [phone]  - [phone] FX     Does the patient have less than a 3 day supply:  [x] Yes  [] No

## 2023-03-27 ENCOUNTER — LAB (OUTPATIENT)
Dept: LAB | Facility: HOSPITAL | Age: 63
End: 2023-03-27
Payer: MEDICARE

## 2023-03-27 ENCOUNTER — OFFICE VISIT (OUTPATIENT)
Dept: FAMILY MEDICINE CLINIC | Age: 63
End: 2023-03-27
Payer: MEDICARE

## 2023-03-27 VITALS
BODY MASS INDEX: 33.39 KG/M2 | HEART RATE: 64 BPM | DIASTOLIC BLOOD PRESSURE: 60 MMHG | SYSTOLIC BLOOD PRESSURE: 130 MMHG | HEIGHT: 64 IN | TEMPERATURE: 97.3 F | WEIGHT: 195.6 LBS

## 2023-03-27 DIAGNOSIS — F32.A CHRONIC DEPRESSION: ICD-10-CM

## 2023-03-27 DIAGNOSIS — E78.2 MIXED HYPERLIPIDEMIA: ICD-10-CM

## 2023-03-27 DIAGNOSIS — I10 ESSENTIAL HYPERTENSION: ICD-10-CM

## 2023-03-27 DIAGNOSIS — M79.7 FIBROMYALGIA: ICD-10-CM

## 2023-03-27 DIAGNOSIS — I10 ESSENTIAL HYPERTENSION: Primary | ICD-10-CM

## 2023-03-27 LAB
ALBUMIN SERPL-MCNC: 4.4 G/DL (ref 3.5–5.2)
ALBUMIN/GLOB SERPL: 1.7 G/DL
ALP SERPL-CCNC: 74 U/L (ref 39–117)
ALT SERPL W P-5'-P-CCNC: 17 U/L (ref 1–33)
ANION GAP SERPL CALCULATED.3IONS-SCNC: 10 MMOL/L (ref 5–15)
AST SERPL-CCNC: 24 U/L (ref 1–32)
BILIRUB SERPL-MCNC: 0.3 MG/DL (ref 0–1.2)
BUN SERPL-MCNC: 18 MG/DL (ref 8–23)
BUN/CREAT SERPL: 20 (ref 7–25)
CALCIUM SPEC-SCNC: 9.3 MG/DL (ref 8.6–10.5)
CHLORIDE SERPL-SCNC: 103 MMOL/L (ref 98–107)
CO2 SERPL-SCNC: 26 MMOL/L (ref 22–29)
CREAT SERPL-MCNC: 0.9 MG/DL (ref 0.57–1)
EGFRCR SERPLBLD CKD-EPI 2021: 73.3 ML/MIN/1.73
GLOBULIN UR ELPH-MCNC: 2.6 GM/DL
GLUCOSE SERPL-MCNC: 104 MG/DL (ref 65–99)
POTASSIUM SERPL-SCNC: 3.9 MMOL/L (ref 3.5–5.2)
PROT SERPL-MCNC: 7 G/DL (ref 6–8.5)
SODIUM SERPL-SCNC: 139 MMOL/L (ref 136–145)
TSH SERPL DL<=0.05 MIU/L-ACNC: 2.25 UIU/ML (ref 0.27–4.2)

## 2023-03-27 PROCEDURE — 84443 ASSAY THYROID STIM HORMONE: CPT

## 2023-03-27 PROCEDURE — 3075F SYST BP GE 130 - 139MM HG: CPT | Performed by: FAMILY MEDICINE

## 2023-03-27 PROCEDURE — 1159F MED LIST DOCD IN RCRD: CPT | Performed by: FAMILY MEDICINE

## 2023-03-27 PROCEDURE — 99214 OFFICE O/P EST MOD 30 MIN: CPT | Performed by: FAMILY MEDICINE

## 2023-03-27 PROCEDURE — 80053 COMPREHEN METABOLIC PANEL: CPT

## 2023-03-27 PROCEDURE — 1160F RVW MEDS BY RX/DR IN RCRD: CPT | Performed by: FAMILY MEDICINE

## 2023-03-27 PROCEDURE — 36415 COLL VENOUS BLD VENIPUNCTURE: CPT

## 2023-03-27 PROCEDURE — 3078F DIAST BP <80 MM HG: CPT | Performed by: FAMILY MEDICINE

## 2023-03-27 RX ORDER — AMLODIPINE BESYLATE 10 MG/1
10 TABLET ORAL DAILY
Qty: 90 TABLET | Refills: 3 | Status: SHIPPED | OUTPATIENT
Start: 2023-03-27

## 2023-03-27 RX ORDER — LOSARTAN POTASSIUM 100 MG/1
100 TABLET ORAL DAILY
Qty: 90 TABLET | Refills: 3 | Status: SHIPPED | OUTPATIENT
Start: 2023-03-27

## 2023-03-27 RX ORDER — OMEPRAZOLE 40 MG/1
40 CAPSULE, DELAYED RELEASE ORAL DAILY
Qty: 90 CAPSULE | Refills: 3 | Status: SHIPPED | OUTPATIENT
Start: 2023-03-27

## 2023-03-27 RX ORDER — ATORVASTATIN CALCIUM 20 MG/1
20 TABLET, FILM COATED ORAL DAILY
Qty: 90 TABLET | Refills: 3 | Status: SHIPPED | OUTPATIENT
Start: 2023-03-27

## 2023-03-27 RX ORDER — HYDROCHLOROTHIAZIDE 25 MG/1
25 TABLET ORAL DAILY
Qty: 90 TABLET | Refills: 3 | Status: SHIPPED | OUTPATIENT
Start: 2023-03-27

## 2023-03-27 RX ORDER — DULOXETIN HYDROCHLORIDE 30 MG/1
30 CAPSULE, DELAYED RELEASE ORAL DAILY
Qty: 30 CAPSULE | Refills: 1 | Status: SHIPPED | OUTPATIENT
Start: 2023-03-27

## 2023-03-27 RX ORDER — CARVEDILOL 6.25 MG/1
6.25 TABLET ORAL 2 TIMES DAILY
Qty: 180 TABLET | Refills: 1 | Status: SHIPPED | OUTPATIENT
Start: 2023-03-27

## 2023-03-27 NOTE — PROGRESS NOTES
Chely Calderón presents to [facility].    Chief Complaint:  Follow up on fibromyalgia, blood pressure, cholesterol    Subjective       History of Present Illness:  Chely is in today for follow-up on her care.  She has fibromyalgia for which she she takes over-the-counter NSAID or acetaminophen.  She does have some upset stomach with naproxen that bothers her with it.  She would be open to some other type of medication for this.    She also has hypertension and elevated cholesterol for which she remains on treatments as noted.  Her blood pressure is well controlled today.  She is not aware of obvious side effects from the medications that she takes.    Chely continues to struggle emotionally.  She has family stressors that are difficult for her to deal with.  She denies suicidal thoughts at this time, but she has been struggling as noted above.  She was previously told that she had PTSD and difficult past memories with which she deals.  She did start counseling recently through an online service.    Review of Systems:  Review of Systems   Constitutional: Negative for chills and fever.   Respiratory: Negative for cough and shortness of breath.    Cardiovascular: Negative for chest pain and palpitations.   Gastrointestinal: Negative for abdominal pain, nausea and vomiting.        Objective   Medical History:  Past Medical History:   • Arthritis   • Dysuria   • Essential (primary) hypertension   • Family history of malignant neoplasm of breast   • Fever   • Fibromyalgia   • Fibromyalgia, primary   • Foot pain, right   • GERD without esophagitis   • Low back pain   • Mixed hyperlipidemia   • Other fatigue   • Other specified anxiety disorders   • Spontaneous ecchymoses   • Unspecified abdominal pain   • Unspecified lump in the right breast, unspecified quadrant     Past Surgical History:   • CARDIAC CATHETERIZATION   • CHOLECYSTECTOMY   • COLONOSCOPY   • ENDOMETRIAL ABLATION   • TUBAL  ABDOMINAL LIGATION      Family History   Problem Relation Age of Onset   • Hyperlipidemia Mother    • Diabetes type II Mother    • Arthritis Mother    • Heart disease Mother    • Breast cancer Sister    • Arthritis Sister    • Cancer Sister    • Arthritis Father      Social History     Tobacco Use   • Smoking status: Former     Packs/day: 0.25     Years: 21.00     Pack years: 5.25     Types: Cigarettes     Start date: 1/1/1992     Quit date: 1/1/2013     Years since quitting: 10.2   • Smokeless tobacco: Never   • Tobacco comments:     off and on from her 30s    Substance Use Topics   • Alcohol use: Yes     Comment: 4-5 times a week       Health Maintenance Due   Topic Date Due   • TDAP/TD VACCINES (1 - Tdap) Never done   • ZOSTER VACCINE (1 of 2) Never done   • PAP SMEAR  Never done        Immunization History   Administered Date(s) Administered   • COVID-19 (MODERNA) 1st, 2nd, 3rd Dose Only 03/12/2021, 04/09/2021, 11/18/2021   • COVID-19 (PFIZER) BIVALENT BOOSTER 12+YRS 09/30/2022   • Covid-19 (Pfizer) Gray Cap 07/11/2022   • Flu Vaccine Intradermal Quad 18-64YR 10/16/2012, 09/30/2016, 09/13/2017   • FluLaval/Fluzone >6mos 09/19/2022   • Flublok 18+yrs 10/15/2021   • Influenza Quad Vaccine (Inpatient) 11/01/2013   • Influenza, Unspecified 10/22/2020       Allergies   Allergen Reactions   • Lisinopril Cough        Medications:  Current Outpatient Medications on File Prior to Visit   Medication Sig   • aspirin 81 MG chewable tablet Chew 1 tablet Daily.   • multivitamin with minerals tablet tablet Take 1 tablet by mouth Daily.   • Probiotic Product (PROBIOTIC PO) Take 1 tablet by mouth Daily.   • [DISCONTINUED] amLODIPine (NORVASC) 10 MG tablet Take 1 tablet by mouth Daily.   • [DISCONTINUED] atorvastatin (LIPITOR) 20 MG tablet Take 1 tablet by mouth Daily.   • [DISCONTINUED] carvedilol (COREG) 6.25 MG tablet Take 1 tablet by mouth 2 (Two) Times a Day.   • [DISCONTINUED] hydroCHLOROthiazide (HYDRODIURIL) 25 MG tablet  "Take 1 tablet by mouth Daily.   • [DISCONTINUED] losartan (COZAAR) 100 MG tablet Take 1 tablet by mouth Daily.   • [DISCONTINUED] omeprazole (priLOSEC) 40 MG capsule Take 1 capsule by mouth Daily.     No current facility-administered medications on file prior to visit.       Vital Signs:   /60 (BP Location: Right arm, Patient Position: Sitting)   Pulse 64   Temp 97.3 °F (36.3 °C) (Oral)   Ht 162.6 cm (64.02\")   Wt 88.7 kg (195 lb 9.6 oz)   BMI 33.56 kg/m²       Physical Exam:  Physical Exam  Vitals reviewed.   Constitutional:       General: She is not in acute distress.     Appearance: She is not ill-appearing.   Eyes:      Pupils: Pupils are equal, round, and reactive to light.   Neck:      Comments: No thyromegaly  Cardiovascular:      Rate and Rhythm: Normal rate and regular rhythm.   Pulmonary:      Effort: Pulmonary effort is normal.      Breath sounds: Normal breath sounds.   Abdominal:      General: There is no distension.      Palpations: Abdomen is soft.      Tenderness: There is no abdominal tenderness.   Musculoskeletal:      Cervical back: Neck supple.   Lymphadenopathy:      Cervical: No cervical adenopathy.   Skin:     Findings: No lesion or rash.   Neurological:      Mental Status: She is alert.   Psychiatric:         Mood and Affect: Affect is flat and tearful.         Result Review      The following data was reviewed by Manoj Ybarra MD on 03/27/2023.  Lab Results   Component Value Date    WBC 9.74 03/17/2022    HGB 12.7 03/17/2022    HCT 39.8 03/17/2022    MCV 87.5 03/17/2022     03/17/2022     Lab Results   Component Value Date    GLUCOSE 109 (H) 09/30/2022    BUN 22 09/30/2022    CREATININE 0.90 12/01/2022     09/30/2022    K 3.8 09/30/2022     09/30/2022    CO2 27.0 09/30/2022    CALCIUM 9.7 09/30/2022    PROTEINTOT 7.0 09/30/2022    ALBUMIN 4.70 09/30/2022    ALT 13 09/30/2022    AST 18 09/30/2022    ALKPHOS 68 09/30/2022    BILITOT 0.5 09/30/2022    EGFR " 73.3 12/01/2022    GLOB 2.3 09/30/2022    AGRATIO 2.0 09/30/2022    BCR 22.7 09/30/2022    ANIONGAP 11.0 09/30/2022      Lab Results   Component Value Date    CHOL 196 09/30/2022    CHLPL 175 03/22/2021    TRIG 111 09/30/2022    HDL 53 09/30/2022     (H) 09/30/2022     Lab Results   Component Value Date    TSH 3.440 03/17/2022     Lab Results   Component Value Date    HGBA1C 5.30 09/30/2022            Assessment and Plan:   Today, we have reviewed her care.  Chely has been struggling as noted above.  Because of the pain issues and depression with which he deals, we will prescribe Cymbalta for her and see if it is helpful.  We will reach out to her over MyChart in about 3 weeks and see if we should increase the dose of the medication.  She is able to contract with me that she is not going to harm herself without seeking additional attention.  She is already enrolled in some counseling, and I have encouraged her to continue that.  She is dealing with some complicated issues and stressors.  Her usual health problems seem relatively stable otherwise.  We will refill needed medications and tentatively plan to see her back in about 6 months.  No other short-term changes anticipated.        Diagnoses and all orders for this visit:    1. Essential hypertension (Primary)  -     amLODIPine (NORVASC) 10 MG tablet; Take 1 tablet by mouth Daily.  Dispense: 90 tablet; Refill: 3  -     carvedilol (COREG) 6.25 MG tablet; Take 1 tablet by mouth 2 (Two) Times a Day.  Dispense: 180 tablet; Refill: 1  -     hydroCHLOROthiazide (HYDRODIURIL) 25 MG tablet; Take 1 tablet by mouth Daily.  Dispense: 90 tablet; Refill: 3  -     losartan (COZAAR) 100 MG tablet; Take 1 tablet by mouth Daily.  Dispense: 90 tablet; Refill: 3  -     Comprehensive Metabolic Panel; Future    2. Mixed hyperlipidemia  -     atorvastatin (LIPITOR) 20 MG tablet; Take 1 tablet by mouth Daily.  Dispense: 90 tablet; Refill: 3  -     Comprehensive Metabolic Panel;  Future  -     TSH; Future    3. Fibromyalgia  -     DULoxetine (CYMBALTA) 30 MG capsule; Take 1 capsule by mouth Daily.  Dispense: 30 capsule; Refill: 1    4. Chronic depression  -     DULoxetine (CYMBALTA) 30 MG capsule; Take 1 capsule by mouth Daily.  Dispense: 30 capsule; Refill: 1    Other orders  -     omeprazole (priLOSEC) 40 MG capsule; Take 1 capsule by mouth Daily.  Dispense: 90 capsule; Refill: 3    Follow Up   Return in about 6 months (around 9/27/2023) for Recheck.  Patient was given instructions and counseling regarding her condition or for health maintenance advice. Please see specific information pulled into the AVS if appropriate.

## 2023-05-25 ENCOUNTER — TELEPHONE (OUTPATIENT)
Dept: FAMILY MEDICINE CLINIC | Age: 63
End: 2023-05-25
Payer: MEDICARE

## 2023-05-25 DIAGNOSIS — F32.A CHRONIC DEPRESSION: ICD-10-CM

## 2023-05-25 NOTE — TELEPHONE ENCOUNTER
----- Message from Laura Irving LPN sent at 4/27/2023 12:24 PM EDT -----  How is she doing with lexapro?

## 2023-05-26 RX ORDER — FLUOXETINE HYDROCHLORIDE 20 MG/1
20 CAPSULE ORAL DAILY
Qty: 30 CAPSULE | Refills: 1 | Status: SHIPPED | OUTPATIENT
Start: 2023-05-26

## 2023-05-26 NOTE — TELEPHONE ENCOUNTER
Noted.  I would recommend she stop it then.  I have sent a prescription for generic Prozac to [facility] to REPLACE Lexapro.  It is a good antidepressant and is less likely to cause sleepiness.  Hopefully, it will be of benefit to her.  Please tickle to call her again in 3 weeks to see how she is doing.  Let me know if she has other immediate concerns.  Thanks.

## 2023-06-14 ENCOUNTER — OFFICE VISIT (OUTPATIENT)
Dept: CARDIOLOGY | Facility: CLINIC | Age: 63
End: 2023-06-14
Payer: MEDICARE

## 2023-06-14 VITALS
DIASTOLIC BLOOD PRESSURE: 65 MMHG | WEIGHT: 191 LBS | HEART RATE: 57 BPM | BODY MASS INDEX: 32.61 KG/M2 | SYSTOLIC BLOOD PRESSURE: 146 MMHG | HEIGHT: 64 IN

## 2023-06-14 DIAGNOSIS — I10 ESSENTIAL HYPERTENSION: ICD-10-CM

## 2023-06-14 DIAGNOSIS — R07.89 CHEST PAIN, ATYPICAL: ICD-10-CM

## 2023-06-14 DIAGNOSIS — E78.2 MIXED HYPERLIPIDEMIA: Primary | ICD-10-CM

## 2023-06-14 PROCEDURE — 1160F RVW MEDS BY RX/DR IN RCRD: CPT | Performed by: INTERNAL MEDICINE

## 2023-06-14 PROCEDURE — 3077F SYST BP >= 140 MM HG: CPT | Performed by: INTERNAL MEDICINE

## 2023-06-14 PROCEDURE — 99213 OFFICE O/P EST LOW 20 MIN: CPT | Performed by: INTERNAL MEDICINE

## 2023-06-14 PROCEDURE — 1159F MED LIST DOCD IN RCRD: CPT | Performed by: INTERNAL MEDICINE

## 2023-06-14 PROCEDURE — 3078F DIAST BP <80 MM HG: CPT | Performed by: INTERNAL MEDICINE

## 2023-06-14 NOTE — ASSESSMENT & PLAN NOTE
Atypical symptoms, persistent for the past several years.  Multiple previous cardiac evaluation including stress test were unremarkable.  She also has fibromyalgia.  We will hold off on doing any further testing at this time.

## 2023-06-14 NOTE — PROGRESS NOTES
CARDIOLOGY FOLLOW-UP PROGRESS NOTE        Chief Complaint  Follow-up, Hypertension, and Chest Pain    Subjective            Chely Calderón presents to [facility] CARDIOLOGY  History of Present Illness      Ms Calderón routine 6-month follow-up visit.  She continues to intermittent chest discomfort, mostly at rest.  Symptoms are mild.  Palpitations are better.  Her fibromyalgia is not well controlled and she is currently being tried on different medications.  No recent hospitalizations or ER visits.     Past History:    (1) Fibromyalgia. (2) Hypertension. (3) Hyperlipidemia. (4) Negative for coronary artery disease or diabetes mellitus.     Medical History:  Past Medical History:   Diagnosis Date    Arthritis     Dysuria     Essential (primary) hypertension     Family history of malignant neoplasm of breast     Fever     Fibromyalgia     Fibromyalgia, primary 2001    Foot pain, right     GERD without esophagitis     Low back pain     Mixed hyperlipidemia     Other fatigue     Other specified anxiety disorders     Spontaneous ecchymoses     Unspecified abdominal pain     Unspecified lump in the right breast, unspecified quadrant        Surgical History: has a past surgical history that includes Cholecystectomy; Tubal ligation; Endometrial ablation; Cardiac catheterization; and Colonoscopy.     Family History: family history includes Arthritis in her father, mother, and sister; Breast cancer in her sister; Cancer in her sister; Diabetes type II in her mother; Heart disease in her mother; Hyperlipidemia in her mother.     Social History: reports that she quit smoking about 10 years ago. Her smoking use included cigarettes. She started smoking about 31 years ago. She has a 5.25 pack-year smoking history. She has never used smokeless tobacco. She reports current alcohol use. She reports that she does not use drugs.    Allergies: Lisinopril    Current Outpatient Medications on File Prior to Visit  "  Medication Sig    amLODIPine (NORVASC) 10 MG tablet Take 1 tablet by mouth Daily.    aspirin 81 MG chewable tablet Chew 1 tablet Daily.    atorvastatin (LIPITOR) 20 MG tablet Take 1 tablet by mouth Daily.    carvedilol (COREG) 6.25 MG tablet Take 1 tablet by mouth 2 (Two) Times a Day.    hydroCHLOROthiazide (HYDRODIURIL) 25 MG tablet Take 1 tablet by mouth Daily.    losartan (COZAAR) 100 MG tablet Take 1 tablet by mouth Daily.    multivitamin with minerals tablet tablet Take 1 tablet by mouth Daily.    omeprazole (priLOSEC) 40 MG capsule Take 1 capsule by mouth Daily.    Probiotic Product (PROBIOTIC PO) Take 1 tablet by mouth Daily.       Review of Systems   Respiratory:  Negative for cough, shortness of breath and wheezing.    Cardiovascular:  Positive for chest pain. Negative for palpitations and leg swelling.   Gastrointestinal:  Negative for nausea and vomiting.   Musculoskeletal:  Positive for myalgias.   Neurological:  Negative for dizziness and syncope.      Objective     /65   Pulse 57   Ht 162.6 cm (64\")   Wt 86.6 kg (191 lb)   BMI 32.79 kg/m²       Physical Exam    General : Alert, awake, no acute distress  Neck : Supple, no carotid bruit, no jugular venous distention  CVS : Regular rate and rhythm, no murmur, rubs or gallops  Lungs: Clear to auscultation bilaterally, no crackles or rhonchi  Abdomen: Soft, nontender, bowel sounds heard in all 4 quadrants  Extremities: Warm, well-perfused, no pedal edema    Result Review :     The following data was reviewed by: Jesse Wilson MD on 06/14/2023:    CMP          9/30/2022    09:36 12/1/2022    14:39 3/27/2023    09:31   CMP   Glucose 109   104    BUN 22   18    Creatinine 0.97  0.90  0.90    EGFR 67.0  73.3  73.3    Sodium 140   139    Potassium 3.8   3.9    Chloride 102   103    Calcium 9.7   9.3    Total Protein 7.0   7.0    Albumin 4.70   4.4    Globulin 2.3   2.6    Total Bilirubin 0.5   0.3    Alkaline Phosphatase 68   74    AST (SGOT) 18  "  24    ALT (SGPT) 13   17    Albumin/Globulin Ratio 2.0   1.7    BUN/Creatinine Ratio 22.7   20.0    Anion Gap 11.0   10.0        TSH          3/27/2023    09:31   TSH   TSH 2.250      Lipid Panel          9/30/2022    09:36   Lipid Panel   Total Cholesterol 196    Triglycerides 111    HDL Cholesterol 53    VLDL Cholesterol 20    LDL Cholesterol  123    LDL/HDL Ratio 2.28                      Assessment and Plan        Diagnoses and all orders for this visit:    1. Mixed hyperlipidemia (Primary)  Assessment & Plan:  She is currently atorvastatin but reports increasing muscle pains.  Advised her to hold atorvastatin for 3 to 5 days and see whether myalgia improves.  If not, she has to go back on the medication.  She verbalized understanding.      2. Essential hypertension  Assessment & Plan:  Blood pressure reasonably well controlled.  Continue carvedilol, losartan hydrochlorothiazide at the current dose without changes.      3. Chest pain, atypical  Assessment & Plan:  Atypical symptoms, persistent for the past several years.  Multiple previous cardiac evaluation including stress test were unremarkable.  She also has fibromyalgia.  We will hold off on doing any further testing at this time.                Follow Up     Return in about 1 year (around 6/14/2024) for Next scheduled follow up.    Patient was given instructions and counseling regarding her condition or for health maintenance advice. Please see specific information pulled into the AVS if appropriate.

## 2023-06-14 NOTE — ASSESSMENT & PLAN NOTE
Blood pressure reasonably well controlled.  Continue carvedilol, losartan hydrochlorothiazide at the current dose without changes.

## 2023-06-14 NOTE — ASSESSMENT & PLAN NOTE
She is currently atorvastatin but reports increasing muscle pains.  Advised her to hold atorvastatin for 3 to 5 days and see whether myalgia improves.  If not, she has to go back on the medication.  She verbalized understanding.

## 2023-06-16 ENCOUNTER — TELEPHONE (OUTPATIENT)
Dept: FAMILY MEDICINE CLINIC | Age: 63
End: 2023-06-16

## 2023-06-16 NOTE — TELEPHONE ENCOUNTER
Caller: Chely Calderón    Relationship: Self    Best call back number: [phone]     What was the call regarding: PATIENT STATES SHE IS GOING TO START TAKING HER DULOXETINE AGAIN.

## 2023-07-06 PROBLEM — R10.30 LOWER ABDOMINAL PAIN: Status: ACTIVE | Noted: 2023-07-06

## 2023-07-06 PROBLEM — F32.A CHRONIC DEPRESSION: Status: ACTIVE | Noted: 2023-07-06

## 2023-07-25 ENCOUNTER — OFFICE VISIT (OUTPATIENT)
Dept: FAMILY MEDICINE CLINIC | Age: 63
End: 2023-07-25
Payer: MEDICARE

## 2023-07-25 VITALS
HEART RATE: 71 BPM | HEIGHT: 64 IN | DIASTOLIC BLOOD PRESSURE: 85 MMHG | SYSTOLIC BLOOD PRESSURE: 131 MMHG | WEIGHT: 193.6 LBS | BODY MASS INDEX: 33.05 KG/M2 | TEMPERATURE: 98.2 F

## 2023-07-25 DIAGNOSIS — R10.30 LOWER ABDOMINAL PAIN: Primary | ICD-10-CM

## 2023-07-25 PROCEDURE — 1160F RVW MEDS BY RX/DR IN RCRD: CPT | Performed by: FAMILY MEDICINE

## 2023-07-25 PROCEDURE — 3079F DIAST BP 80-89 MM HG: CPT | Performed by: FAMILY MEDICINE

## 2023-07-25 PROCEDURE — 3075F SYST BP GE 130 - 139MM HG: CPT | Performed by: FAMILY MEDICINE

## 2023-07-25 PROCEDURE — 1159F MED LIST DOCD IN RCRD: CPT | Performed by: FAMILY MEDICINE

## 2023-07-25 PROCEDURE — 99213 OFFICE O/P EST LOW 20 MIN: CPT | Performed by: FAMILY MEDICINE

## 2023-07-25 RX ORDER — BACLOFEN 10 MG/1
10 TABLET ORAL DAILY
COMMUNITY
Start: 2023-07-11

## 2023-07-25 NOTE — PROGRESS NOTES
Chely Calderón presents to [facility].    Chief Complaint:  Abdominal pain    Subjective        History of Present Illness:  Chely is being seen today for follow up on lower abdominal pain.  Please see her most recent visit.  She ended up having essentially normal CT scan that did not point to an obvious cause for her abdominal pain.  She did have follow-up ultrasound the following day that did not show a worrisome process either.  Gynecology did see her on 7/11/2023 and suggested she might want to see urology if she is having ongoing pain.  Chely does think her symptoms could be related to the bladder.  She has a diagnosis of interstitial cystitis.    Regarding epigastric pain, she has seen improvement with pain since starting sucralfate.  She already takes omeprazole.    Review of Systems:  Review of Systems   Constitutional:  Negative for chills and fever.   Respiratory:  Negative for cough and shortness of breath.    Cardiovascular:  Negative for chest pain and palpitations.   Gastrointestinal:  Negative for abdominal pain, nausea and vomiting.      Objective   Medical History:  Past Medical History:    Arthritis    Dysuria    Essential (primary) hypertension    Family history of malignant neoplasm of breast    Fever    Fibromyalgia    Fibromyalgia, primary    Foot pain, right    GERD without esophagitis    Low back pain    Mixed hyperlipidemia    Other fatigue    Other specified anxiety disorders    Spontaneous ecchymoses    Unspecified abdominal pain    Unspecified lump in the right breast, unspecified quadrant     Past Surgical History:    CARDIAC CATHETERIZATION    CHOLECYSTECTOMY    COLONOSCOPY    ENDOMETRIAL ABLATION    TUBAL ABDOMINAL LIGATION      Family History   Problem Relation Age of Onset    Hyperlipidemia Mother     Diabetes type II Mother     Arthritis Mother     Heart disease Mother     Breast cancer Sister     Arthritis Sister     Cancer Sister     Arthritis  Father      Social History     Tobacco Use    Smoking status: Former     Packs/day: 0.25     Years: 21.00     Pack years: 5.25     Types: Cigarettes     Start date: 1/1/1992     Quit date: 1/1/2013     Years since quitting: 10.5    Smokeless tobacco: Never    Tobacco comments:     off and on from her 30s    Substance Use Topics    Alcohol use: Yes     Comment: 4-5 times a week       Health Maintenance Due   Topic Date Due    TDAP/TD VACCINES (1 - Tdap) Never done    ZOSTER VACCINE (1 of 2) Never done    PAP SMEAR  Never done        Immunization History   Administered Date(s) Administered    COVID-19 (MODERNA) 1st,2nd,3rd Dose Monovalent 03/12/2021, 04/09/2021, 11/18/2021    COVID-19 (PFIZER) BIVALENT 12+YRS 09/30/2022    Covid-19 (Pfizer) Gray Cap Monovalent 07/11/2022    Flu Vaccine Intradermal Quad 18-64YR 10/16/2012, 09/30/2016, 09/13/2017    FluLaval/Fluzone >6mos 09/19/2022    Flublok 18+yrs 10/15/2021    Influenza Quad Vaccine (Inpatient) 11/01/2013    Influenza Seasonal Injectable 10/16/2012, 09/30/2016, 09/13/2017    Influenza, Unspecified 10/22/2020       Allergies   Allergen Reactions    Lisinopril Cough        Medications:  Current Outpatient Medications on File Prior to Visit   Medication Sig    amLODIPine (NORVASC) 10 MG tablet Take 1 tablet by mouth Daily.    aspirin 81 MG chewable tablet Chew 1 tablet Daily.    atorvastatin (LIPITOR) 20 MG tablet Take 1 tablet by mouth Daily.    baclofen (LIORESAL) 10 MG tablet Take 1 tablet by mouth Daily.    carvedilol (COREG) 6.25 MG tablet Take 1 tablet by mouth 2 (Two) Times a Day.    DULoxetine (CYMBALTA) 30 MG capsule Take 1 capsule by mouth Daily.    losartan (COZAAR) 100 MG tablet Take 1 tablet by mouth Daily.    multivitamin with minerals tablet tablet Take 1 tablet by mouth Daily.    omeprazole (priLOSEC) 40 MG capsule Take 1 capsule by mouth Daily.    sucralfate (Carafate) 1 g tablet Take 1 tablet by mouth 3 (Three) Times a Day.    [DISCONTINUED]  "hydroCHLOROthiazide (HYDRODIURIL) 25 MG tablet Take 1 tablet by mouth Daily.    [DISCONTINUED] Probiotic Product (PROBIOTIC PO) Take 1 tablet by mouth Daily.     No current facility-administered medications on file prior to visit.       Vital Signs:   /85 (BP Location: Right arm, Patient Position: Sitting)   Pulse 71   Temp 98.2 °F (36.8 °C) (Oral)   Ht 162.6 cm (64.02\")   Wt 87.8 kg (193 lb 9.6 oz)   BMI 33.21 kg/m²       Physical Exam:  Physical Exam  Vitals reviewed.   Constitutional:       General: She is not in acute distress.     Appearance: She is not ill-appearing.   Eyes:      Pupils: Pupils are equal, round, and reactive to light.   Neck:      Comments: No thyromegaly  Cardiovascular:      Rate and Rhythm: Normal rate and regular rhythm.   Pulmonary:      Effort: Pulmonary effort is normal.      Breath sounds: Normal breath sounds.   Abdominal:      General: There is no distension.      Palpations: Abdomen is soft.      Tenderness: There is no abdominal tenderness.   Musculoskeletal:      Cervical back: Neck supple.   Lymphadenopathy:      Cervical: No cervical adenopathy.   Skin:     Findings: No lesion or rash.   Neurological:      Mental Status: She is alert.       Result Review      The following data was reviewed by Manoj Ybarra MD on 07/25/2023.  Lab Results   Component Value Date    WBC 6.43 07/06/2023    HGB 12.9 07/06/2023    HCT 40.3 07/06/2023    MCV 87.0 07/06/2023     07/06/2023     Lab Results   Component Value Date    GLUCOSE 98 07/06/2023    BUN 17 07/06/2023    CREATININE 0.95 07/06/2023     07/06/2023    K 3.9 07/06/2023     07/06/2023    CO2 24.8 07/06/2023    CALCIUM 9.6 07/06/2023    PROTEINTOT 7.4 07/06/2023    ALBUMIN 4.7 07/06/2023    ALT 26 07/06/2023    AST 29 07/06/2023    ALKPHOS 77 07/06/2023    BILITOT 0.6 07/06/2023    EGFR 68.7 07/06/2023    GLOB 2.7 07/06/2023    AGRATIO 1.7 07/06/2023    BCR 17.9 07/06/2023    ANIONGAP 12.2 " 07/06/2023      Lab Results   Component Value Date    CHOL 196 09/30/2022    CHLPL 175 03/22/2021    TRIG 111 09/30/2022    HDL 53 09/30/2022     (H) 09/30/2022     Lab Results   Component Value Date    TSH 2.250 03/27/2023     Lab Results   Component Value Date    HGBA1C 5.30 09/30/2022            Assessment and Plan:   Today, we have reviewed her care.  Chely has seen some improvement in how she feels over the last couple of weeks.  I have recommended she complete the course of sucralfate as prescribed.  She will also continue some muscle relaxer that was prescribed through gynecology and may follow-up with them at some point regarding her bladder.  Regarding her blood pressure, she will continue to hold hydrochlorothiazide.  We will reach out to her in about 30 days over [facility] asking her to stop in for a blood pressure check.  Otherwise, she will follow-up in September for Medicare wellness exam as scheduled.  If she has a significant recurrence of abdominal pain after stopping sucralfate, I did ask her to reach out.  It remains an open question whether to consider referral for EGD at some point.       Diagnoses and all orders for this visit:    1. Lower abdominal pain (Primary)    Follow Up   Return in about 8 weeks (around 9/21/2023) for Recheck as scheduled.  Patient was given instructions and counseling regarding her condition or for health maintenance advice. Please see specific information pulled into the AVS if appropriate.

## 2023-08-08 DIAGNOSIS — R10.30 LOWER ABDOMINAL PAIN: ICD-10-CM

## 2023-08-08 RX ORDER — SUCRALFATE 1 G/1
1 TABLET ORAL 3 TIMES DAILY
Qty: 90 TABLET | Refills: 0 | OUTPATIENT
Start: 2023-08-08

## 2023-08-08 NOTE — TELEPHONE ENCOUNTER
At her recent visit, I recommended she complete the course of sucralfate and see how things progressed.  If she having a recurrent problem with her stomach for which she thinks we need to refill this?  Thanks.

## 2023-09-25 ENCOUNTER — OFFICE VISIT (OUTPATIENT)
Dept: FAMILY MEDICINE CLINIC | Age: 63
End: 2023-09-25

## 2023-09-25 VITALS
HEART RATE: 61 BPM | SYSTOLIC BLOOD PRESSURE: 108 MMHG | TEMPERATURE: 97.9 F | DIASTOLIC BLOOD PRESSURE: 51 MMHG | HEIGHT: 64 IN | BODY MASS INDEX: 32.1 KG/M2 | WEIGHT: 188 LBS

## 2023-09-25 DIAGNOSIS — Z00.00 PHYSICAL EXAM: Primary | ICD-10-CM

## 2023-09-25 DIAGNOSIS — E78.2 MIXED HYPERLIPIDEMIA: ICD-10-CM

## 2023-09-25 DIAGNOSIS — K21.9 GERD WITHOUT ESOPHAGITIS: ICD-10-CM

## 2023-09-25 DIAGNOSIS — I10 ESSENTIAL HYPERTENSION: ICD-10-CM

## 2023-09-25 DIAGNOSIS — F32.A CHRONIC DEPRESSION: ICD-10-CM

## 2023-09-25 DIAGNOSIS — M79.7 FIBROMYALGIA: ICD-10-CM

## 2023-09-25 PROCEDURE — 1159F MED LIST DOCD IN RCRD: CPT | Performed by: FAMILY MEDICINE

## 2023-09-25 PROCEDURE — 1160F RVW MEDS BY RX/DR IN RCRD: CPT | Performed by: FAMILY MEDICINE

## 2023-09-25 PROCEDURE — G0439 PPPS, SUBSEQ VISIT: HCPCS | Performed by: FAMILY MEDICINE

## 2023-09-25 PROCEDURE — 3074F SYST BP LT 130 MM HG: CPT | Performed by: FAMILY MEDICINE

## 2023-09-25 PROCEDURE — 96160 PT-FOCUSED HLTH RISK ASSMT: CPT | Performed by: FAMILY MEDICINE

## 2023-09-25 PROCEDURE — 3078F DIAST BP <80 MM HG: CPT | Performed by: FAMILY MEDICINE

## 2023-09-25 PROCEDURE — 1170F FXNL STATUS ASSESSED: CPT | Performed by: FAMILY MEDICINE

## 2023-09-25 RX ORDER — OMEPRAZOLE 40 MG/1
40 CAPSULE, DELAYED RELEASE ORAL DAILY
Qty: 90 CAPSULE | Refills: 3 | Status: SHIPPED | OUTPATIENT
Start: 2023-09-25

## 2023-09-25 RX ORDER — LOSARTAN POTASSIUM 100 MG/1
100 TABLET ORAL DAILY
Qty: 90 TABLET | Refills: 3 | Status: SHIPPED | OUTPATIENT
Start: 2023-09-25

## 2023-09-25 RX ORDER — CARVEDILOL 6.25 MG/1
6.25 TABLET ORAL 2 TIMES DAILY
Qty: 180 TABLET | Refills: 3 | Status: SHIPPED | OUTPATIENT
Start: 2023-09-25

## 2023-09-25 RX ORDER — DULOXETIN HYDROCHLORIDE 30 MG/1
30 CAPSULE, DELAYED RELEASE ORAL DAILY
Qty: 90 CAPSULE | Refills: 3 | Status: SHIPPED | OUTPATIENT
Start: 2023-09-25

## 2023-09-25 RX ORDER — AMLODIPINE BESYLATE 10 MG/1
10 TABLET ORAL DAILY
Qty: 90 TABLET | Refills: 3 | Status: SHIPPED | OUTPATIENT
Start: 2023-09-25

## 2023-09-25 RX ORDER — ATORVASTATIN CALCIUM 20 MG/1
20 TABLET, FILM COATED ORAL DAILY
Qty: 90 TABLET | Refills: 3 | Status: SHIPPED | OUTPATIENT
Start: 2023-09-25

## 2023-09-25 NOTE — PROGRESS NOTES
The ABCs of the Annual Wellness Visit  Subsequent Medicare Wellness Visit    Subjective    Chely Calderón is a 63 y.o. female who presents for a Subsequent Medicare Wellness Visit.    The following portions of the patient's history were reviewed and updated as appropriate: allergies, current medications, past family history, past medical history, past social history, past surgical history, and problem list.    Compared to one year ago, the patient feels her physical health is the same.    Compared to one year ago, the patient feels her mental health is the same.    Recent Hospitalizations:  She was not admitted to the hospital during the last year.     Current Medical Providers:  Patient Care Team:  Manoj Ybarra MD as PCP - General (Family Medicine)  Aimee Joseph APRN (Family Medicine)    Outpatient Medications Prior to Visit   Medication Sig Dispense Refill    aspirin 81 MG chewable tablet Chew 1 tablet Daily.      baclofen (LIORESAL) 10 MG tablet Take 1 tablet by mouth Daily.      multivitamin with minerals tablet tablet Take 1 tablet by mouth Daily.      amLODIPine (NORVASC) 10 MG tablet Take 1 tablet by mouth Daily. 90 tablet 3    atorvastatin (LIPITOR) 20 MG tablet Take 1 tablet by mouth Daily. 90 tablet 3    carvedilol (COREG) 6.25 MG tablet Take 1 tablet by mouth 2 (Two) Times a Day. 180 tablet 1    DULoxetine (CYMBALTA) 30 MG capsule Take 1 capsule by mouth Daily. 90 capsule 0    losartan (COZAAR) 100 MG tablet Take 1 tablet by mouth Daily. 90 tablet 3    omeprazole (priLOSEC) 40 MG capsule Take 1 capsule by mouth Daily. 90 capsule 3    sucralfate (Carafate) 1 g tablet Take 1 tablet by mouth 3 (Three) Times a Day. 90 tablet 0     No facility-administered medications prior to visit.       No opioid medication identified on active medication list. I have reviewed chart for other potential  high risk medication/s and harmful drug interactions in the elderly.      Aspirin is on active  "medication list. Aspirin use is indicated based on review of current medical condition/s. Pros and cons of this therapy have been discussed today. Benefits of this medication outweigh potential harm.  Patient has been encouraged to continue taking this medication.  .    Patient Active Problem List   Diagnosis    Hair loss    Mass of axilla, bilateral    Muscle cramps    Essential hypertension    Hyperlipidemia    Low back pain    Other long term (current) drug therapy    Arthritis    Family history of malignant neoplasm of breast    Fibromyalgia    GERD without esophagitis    Other specified anxiety disorders    Lumbar radiculopathy    Chest pain, atypical    Lower abdominal pain    Chronic depression     Advance Care Planning  Advance Directive is not on file.  ACP discussion was held with the patient during this visit. Patient does not have an advance directive, information provided.  Her son, Nir, would make decisions if needed.     Objective    Vitals:    09/25/23 0851   BP: 108/51   BP Location: Left arm   Patient Position: Sitting   Pulse: 61   Temp: 97.9 °F (36.6 °C)   TempSrc: Oral   Weight: 85.3 kg (188 lb)   Height: 162.6 cm (64.02\")     Estimated body mass index is 32.25 kg/m² as calculated from the following:    Height as of this encounter: 162.6 cm (64.02\").    Weight as of this encounter: 85.3 kg (188 lb).    BMI is >= 30 and <35. (Class 1 Obesity). The following options were offered after discussion;: exercise counseling/recommendations and nutrition counseling/recommendations.     Does the patient have evidence of cognitive impairment? No.        HEALTH RISK ASSESSMENT    Smoking Status:  Social History     Tobacco Use   Smoking Status Former    Packs/day: 0.25    Years: 21.00    Pack years: 5.25    Types: Cigarettes    Start date: 1/1/1992    Quit date: 1/1/2013    Years since quitting: 10.7   Smokeless Tobacco Never   Tobacco Comments    off and on from her 30s      Alcohol Consumption:  Social " History     Substance and Sexual Activity   Alcohol Use Yes    Comment: 4-5 times a week     Fall Risk Screen:    MEGAN Fall Risk Assessment has not been completed.    Depression Screenin/25/2023     9:15 AM   PHQ-2/PHQ-9 Depression Screening   Little Interest or Pleasure in Doing Things 0-->not at all   Feeling Down, Depressed or Hopeless 0-->not at all   PHQ-9: Brief Depression Severity Measure Score 0       Health Habits and Functional and Cognitive Screenin/25/2023     9:00 AM   Functional & Cognitive Status   Do you have difficulty preparing food and eating? No   Do you have difficulty bathing yourself, getting dressed or grooming yourself? No   Do you have difficulty using the toilet? No   Do you have difficulty moving around from place to place? No   Do you have trouble with steps or getting out of a bed or a chair? No   Current Diet Well Balanced Diet   Dental Exam Not up to date   Eye Exam Up to date   Exercise (times per week) 0 times per week   Current Exercises Include No Regular Exercise   Do you need help using the phone?  No   Are you deaf or do you have serious difficulty hearing?  No   Do you need help to go to places out of walking distance? No   Do you need help shopping? No   Do you need help preparing meals?  No   Do you need help with housework?  No   Do you need help with laundry? No   Do you need help taking your medications? No   Do you need help managing money? No   Do you ever drive or ride in a car without wearing a seat belt? No   Have you felt unusual stress, anger or loneliness in the last month? No   Who do you live with? Alone   If you need help, do you have trouble finding someone available to you? No   Have you been bothered in the last four weeks by sexual problems? No   Do you have difficulty concentrating, remembering or making decisions? Yes       Age-appropriate Screening Schedule:  Refer to the list below for future screening recommendations based on  "patient's age, sex and/or medical conditions. Orders for these recommended tests are listed in the plan section. The patient has been provided with a written plan.    Health Maintenance   Topic Date Due    TDAP/TD VACCINES (1 - Tdap) Never done    ZOSTER VACCINE (1 of 2) Never done    PAP SMEAR  Never done    BMI FOLLOWUP  09/19/2023    LIPID PANEL  09/30/2023    INFLUENZA VACCINE  10/01/2023    ANNUAL WELLNESS VISIT  09/25/2024    MAMMOGRAM  11/11/2024    COLORECTAL CANCER SCREENING  03/21/2027    HEPATITIS C SCREENING  Completed    COVID-19 Vaccine  Completed    Pneumococcal Vaccine 0-64  Aged Out          CMS Preventative Services Quick Reference  Risk Factors Identified During Encounter  Chronic Pain:  Continue current medications.  Depression/Dysphoria: Current medication is effective, no change recommended  Immunizations Discussed/Encouraged: Tdap, Influenza, Shingrix, and COVID19  The above risks/problems have been discussed with the patient.  Pertinent information has been shared with the patient in the After Visit Summary.  An After Visit Summary and PPPS were made available to the patient.    Follow Up:   Next Medicare Wellness visit to be scheduled in 1 year.     Review of Systems:  Review of Systems   Constitutional:  Negative for chills and fever.   Respiratory:  Negative for cough and shortness of breath.    Cardiovascular:  Negative for chest pain and palpitations.   Gastrointestinal:  Negative for abdominal pain, nausea and vomiting.      Objective   Vital Signs:  /51 (BP Location: Left arm, Patient Position: Sitting)   Pulse 61   Temp 97.9 °F (36.6 °C) (Oral)   Ht 162.6 cm (64.02\")   Wt 85.3 kg (188 lb)   BMI 32.25 kg/m²     Physical Exam  Vitals and nursing note reviewed.   Constitutional:       General: She is not in acute distress.     Appearance: She is not ill-appearing.   HENT:      Right Ear: Tympanic membrane and ear canal normal.      Left Ear: Tympanic membrane and ear canal " normal.      Ears:      Comments: Hearing is normal with forced whisper bilaterally.     Mouth/Throat:      Mouth: Mucous membranes are moist.      Comments: Pharynx appears normal  Eyes:      Extraocular Movements: Extraocular movements intact.      Pupils: Pupils are equal, round, and reactive to light.      Comments: Binocular vision is 20/20 with correction.   Neck:      Thyroid: No thyromegaly.   Cardiovascular:      Rate and Rhythm: Normal rate and regular rhythm.      Heart sounds: No murmur heard.  Pulmonary:      Effort: Pulmonary effort is normal.      Breath sounds: Normal breath sounds.   Abdominal:      General: There is no distension.      Palpations: Abdomen is soft. There is no mass.      Tenderness: There is no abdominal tenderness.   Musculoskeletal:      Cervical back: Normal range of motion.   Skin:     Findings: No lesion or rash.   Neurological:      General: No focal deficit present.      Mental Status: She is oriented to person, place, and time.      Cranial Nerves: No cranial nerve deficit.   Psychiatric:         Mood and Affect: Mood normal.     The following data was reviewed by Manoj Ybarra MD on 09/25/2023.  Lab Results   Component Value Date    WBC 6.43 07/06/2023    HGB 12.9 07/06/2023    HCT 40.3 07/06/2023    MCV 87.0 07/06/2023     07/06/2023     Lab Results   Component Value Date    GLUCOSE 98 07/06/2023    BUN 17 07/06/2023    CREATININE 0.95 07/06/2023     07/06/2023    K 3.9 07/06/2023     07/06/2023    CO2 24.8 07/06/2023    CALCIUM 9.6 07/06/2023    PROTEINTOT 7.4 07/06/2023    ALBUMIN 4.7 07/06/2023    ALT 26 07/06/2023    AST 29 07/06/2023    ALKPHOS 77 07/06/2023    BILITOT 0.6 07/06/2023    EGFR 68.7 07/06/2023    GLOB 2.7 07/06/2023    AGRATIO 1.7 07/06/2023    BCR 17.9 07/06/2023    ANIONGAP 12.2 07/06/2023      Lab Results   Component Value Date    CHOL 196 09/30/2022    CHLPL 175 03/22/2021    TRIG 111 09/30/2022    HDL 53 09/30/2022    LDL  123 (H) 09/30/2022     Lab Results   Component Value Date    TSH 2.250 03/27/2023     Lab Results   Component Value Date    HGBA1C 5.30 09/30/2022               Assessment and Plan:   Today, we have reviewed her care.  Regarding the Medicare wellness exam, she is basically up-to-date on recommended cancer screenings at this time.  We discussed vaccines, but she declines vaccines today.  There is no other specific change in her care I would recommend.  Her medications will be refilled as a courtesy today.  She has had recent blood work that looks good.  Tentative follow-up will be again in about a year.    Diagnoses and all orders for this visit:    1. Physical exam (Primary)    2. Essential hypertension  -     amLODIPine (NORVASC) 10 MG tablet; Take 1 tablet by mouth Daily.  Dispense: 90 tablet; Refill: 3  -     carvedilol (COREG) 6.25 MG tablet; Take 1 tablet by mouth 2 (Two) Times a Day.  Dispense: 180 tablet; Refill: 3  -     losartan (COZAAR) 100 MG tablet; Take 1 tablet by mouth Daily.  Dispense: 90 tablet; Refill: 3    3. Mixed hyperlipidemia  -     atorvastatin (LIPITOR) 20 MG tablet; Take 1 tablet by mouth Daily.  Dispense: 90 tablet; Refill: 3    4. GERD without esophagitis  -     omeprazole (priLOSEC) 40 MG capsule; Take 1 capsule by mouth Daily.  Dispense: 90 capsule; Refill: 3    5. Chronic depression  -     DULoxetine (CYMBALTA) 30 MG capsule; Take 1 capsule by mouth Daily.  Dispense: 90 capsule; Refill: 3    6. Fibromyalgia  -     DULoxetine (CYMBALTA) 30 MG capsule; Take 1 capsule by mouth Daily.  Dispense: 90 capsule; Refill: 3       Follow Up  Return in about 1 year (around 9/25/2024) for Recheck, Medicare Wellness.  Patient was given instructions and counseling regarding her condition or for health maintenance advice. Please see specific information pulled into the AVS if appropriate.

## 2023-11-14 ENCOUNTER — TELEPHONE (OUTPATIENT)
Dept: FAMILY MEDICINE CLINIC | Age: 63
End: 2023-11-14
Payer: MEDICARE

## 2024-01-04 ENCOUNTER — TELEPHONE (OUTPATIENT)
Dept: FAMILY MEDICINE CLINIC | Age: 64
End: 2024-01-04

## 2024-01-04 ENCOUNTER — TELEMEDICINE (OUTPATIENT)
Dept: FAMILY MEDICINE CLINIC | Age: 64
End: 2024-01-04
Payer: MEDICARE

## 2024-01-04 DIAGNOSIS — U07.1 COVID-19: Primary | ICD-10-CM

## 2024-01-04 NOTE — TELEPHONE ENCOUNTER
Caller: Chely Calderón    Relationship: Self    Best call back number: [phone]     What is the best time to reach you: ANYTIME     Who are you requesting to speak with (clinical staff, provider,  specific staff member): CLINICAL     What was the call regarding: PATIENT IS CALLING INQUIRING ABOUT A MEDICATION FOR COVID, FOLLOWING AT HOME TEST POSITIVE, HUB DID ADVISED FOR POSSIBLE APPOINTMENT NEEDED FOR , PRESCRIBED MEDICATION.

## 2024-01-04 NOTE — PROGRESS NOTES
Chely Calderón presents to [facility].    Chief Complaint:  COVID-19    TELEHEALTH VISIT:   - Chely consented to this telehealth visit.   - Persons on the call include:  patient - ChelyDr. Ybarra   - The patient is at home.  I am at the office.   - This visit is being conducted over Zoom with audio and video capability.   - This visit is being done remotely due to the COVID-19 diagnosis.    Subjective   History of Present Illness:  Chely is being seen remotely today due to a diagnosis of COVID-19.  She has tested positive on a home test.  She developed symptoms roughly 48 hours ago including chills, head and chest congestion, sore throat, cough, headache, and diarrhea.  She is not having significant other symptoms of concern such as weakness or shortness of breath.    Review of Systems:  Review of Systems   Constitutional:  Positive for chills. Negative for fever.   HENT:  Positive for congestion and sore throat.    Respiratory:  Positive for cough. Negative for shortness of breath.    Cardiovascular:  Negative for chest pain and palpitations.   Gastrointestinal:  Positive for diarrhea. Negative for abdominal pain, nausea and vomiting.       Objective   Medical History:  Past Medical History:    Arthritis    Dysuria    Essential (primary) hypertension    Family history of malignant neoplasm of breast    Fever    Fibromyalgia    Fibromyalgia, primary    Foot pain, right    GERD without esophagitis    Low back pain    Mixed hyperlipidemia    Other fatigue    Other specified anxiety disorders    Spontaneous ecchymoses    Unspecified abdominal pain    Unspecified lump in the right breast, unspecified quadrant     Past Surgical History:    CARDIAC CATHETERIZATION    CHOLECYSTECTOMY    COLONOSCOPY    ENDOMETRIAL ABLATION    TUBAL ABDOMINAL LIGATION      Family History   Problem Relation Age of Onset    Hyperlipidemia Mother     Diabetes type II Mother     Arthritis Mother     Heart  disease Mother     Breast cancer Sister     Arthritis Sister     Cancer Sister     Arthritis Father      Social History     Tobacco Use    Smoking status: Former     Packs/day: 0.25     Years: 21.00     Additional pack years: 0.00     Total pack years: 5.25     Types: Cigarettes     Start date: 1992     Quit date: 2013     Years since quittin.0    Smokeless tobacco: Never    Tobacco comments:     off and on from her 30s    Substance Use Topics    Alcohol use: Yes     Comment: 4-5 times a week       Health Maintenance Due   Topic Date Due    TDAP/TD VACCINES (1 - Tdap) Never done    ZOSTER VACCINE (1 of 2) Never done    PAP SMEAR  Never done    LIPID PANEL  2023        Immunization History   Administered Date(s) Administered    COVID-19 (MODERNA) 1st,2nd,3rd Dose Monovalent 2021, 2021, 2021    COVID-19 (PFIZER) BIVALENT 12+YRS 2022    COVID-19 F23 (PFIZER) 12YRS+ (COMIRNATY) 10/26/2023    Covid-19 (Pfizer) Gray Cap Monovalent 2022    Flu Vaccine Intradermal Quad 18-64YR 10/16/2012, 2016, 2017    Flublok 18+yrs 10/15/2021, 10/26/2023    Fluzone (or Fluarix & Flulaval for VFC) >6mos 2022    Influenza Quad Vaccine (Inpatient) 2013    Influenza Seasonal Injectable 10/16/2012, 2016, 2017    Influenza, Unspecified 10/22/2020       Allergies   Allergen Reactions    Lisinopril Cough        Medications:  Current Outpatient Medications on File Prior to Visit   Medication Sig    amLODIPine (NORVASC) 10 MG tablet Take 1 tablet by mouth Daily.    aspirin 81 MG chewable tablet Chew 1 tablet Daily.    atorvastatin (LIPITOR) 20 MG tablet Take 1 tablet by mouth Daily.    baclofen (LIORESAL) 10 MG tablet Take 1 tablet by mouth Daily.    carvedilol (COREG) 6.25 MG tablet Take 1 tablet by mouth 2 (Two) Times a Day.    DULoxetine (CYMBALTA) 30 MG capsule Take 1 capsule by mouth Daily.    losartan (COZAAR) 100 MG tablet Take 1 tablet by mouth Daily.     multivitamin with minerals tablet tablet Take 1 tablet by mouth Daily.    omeprazole (priLOSEC) 40 MG capsule Take 1 capsule by mouth Daily.     No current facility-administered medications on file prior to visit.       Vital Signs:   There were no vitals taken for this visit.      Physical Exam:  Physical Exam  Vitals reviewed.   Constitutional:       General: She is not in acute distress.     Appearance: She is ill-appearing (tired appearing).   Eyes:      Pupils: Pupils are equal, round, and reactive to light.   Neck:      Comments: No visible finding  Pulmonary:      Effort: Pulmonary effort is normal.   Musculoskeletal:      Cervical back: Neck supple.   Lymphadenopathy:      Cervical: No cervical adenopathy.   Skin:     Findings: No lesion or rash.   Neurological:      General: No focal deficit present.      Mental Status: She is alert.   Psychiatric:         Mood and Affect: Mood normal.     Result Review   The following data was reviewed by Manoj Ybarra MD on 01/04/2024.  Lab Results   Component Value Date    WBC 6.43 07/06/2023    HGB 12.9 07/06/2023    HCT 40.3 07/06/2023    MCV 87.0 07/06/2023     07/06/2023     Lab Results   Component Value Date    GLUCOSE 98 07/06/2023    BUN 17 07/06/2023    CREATININE 0.95 07/06/2023     07/06/2023    K 3.9 07/06/2023     07/06/2023    CO2 24.8 07/06/2023    CALCIUM 9.6 07/06/2023    PROTEINTOT 7.4 07/06/2023    ALBUMIN 4.7 07/06/2023    ALT 26 07/06/2023    AST 29 07/06/2023    ALKPHOS 77 07/06/2023    BILITOT 0.6 07/06/2023    EGFR 68.7 07/06/2023    GLOB 2.7 07/06/2023    AGRATIO 1.7 07/06/2023    BCR 17.9 07/06/2023    ANIONGAP 12.2 07/06/2023      Lab Results   Component Value Date    CHOL 196 09/30/2022    CHLPL 175 03/22/2021    TRIG 111 09/30/2022    HDL 53 09/30/2022     (H) 09/30/2022     Lab Results   Component Value Date    TSH 2.250 03/27/2023     Lab Results   Component Value Date    HGBA1C 5.30 09/30/2022           Assessment and Plan:   Today, we have reviewed her care.  Given her age, I would recommend moving ahead with treatment with Paxlovid.  We discussed potential risks and benefits including possible side effects such as dysgeusia and diarrhea.  She will monitor for these.    Regarding isolation, it would be recommended that Chely isolate through and including Sunday.  If she does not have fever and is seeing improved symptoms on Thursday, she could release from isolation on Monday.  We would recommend she wear a well fitting mask when around others for at least 5 additional days though.  We also discussed potential red flag symptoms for which she may want to seek medical attention including significant shortness of breath, new chest pain, fever of 100.5 degrees or higher that does not respond to Tylenol, and/or significant weakness.  Hopefully, she will have a mild course with illness.  We will see how things progress.    Diagnoses and all orders for this visit:    1. COVID-19 (Primary)  -     Nirmatrelvir & Ritonavir, 300mg/100mg, (PAXLOVID) 20 x 150 MG & 10 x 100MG tablet therapy pack tablet; Take 3 tablets by mouth 2 (Two) Times a Day. Decrease amlodipine to 1/2 tablet daily while taking.  No atorvastatin for 7 days.  Dispense: 30 tablet; Refill: 0    Follow Up  Return if symptoms worsen or fail to improve.  Patient was given instructions and counseling regarding her condition or for health maintenance advice. Please see specific information pulled into the AVS if appropriate.

## 2024-01-04 NOTE — TELEPHONE ENCOUNTER
Appt justice, has been having symptoms x 3 days, states she uses [facility], but [facility] doesn't have in stock

## 2024-01-04 NOTE — TELEPHONE ENCOUNTER
Confirm how many days she has had COVID.  If she is within 5 day time frame, please work in for telehealth visit at 4:30 PM.  Also, confirm which pharmacy she usually uses and call to see if they have Paxlovid in stock.  Thanks.

## 2024-01-06 ENCOUNTER — APPOINTMENT (OUTPATIENT)
Dept: GENERAL RADIOLOGY | Facility: HOSPITAL | Age: 64
End: 2024-01-06
Payer: MEDICARE

## 2024-01-06 ENCOUNTER — HOSPITAL ENCOUNTER (EMERGENCY)
Facility: HOSPITAL | Age: 64
Discharge: HOME | End: 2024-01-06
Attending: EMERGENCY MEDICINE
Payer: MEDICARE

## 2024-01-06 ENCOUNTER — APPOINTMENT (OUTPATIENT)
Dept: CT IMAGING | Facility: HOSPITAL | Age: 64
End: 2024-01-06
Payer: MEDICARE

## 2024-01-06 VITALS
WEIGHT: 185.41 LBS | HEIGHT: 64 IN | HEART RATE: 97 BPM | TEMPERATURE: 98.3 F | OXYGEN SATURATION: 99 % | SYSTOLIC BLOOD PRESSURE: 141 MMHG | DIASTOLIC BLOOD PRESSURE: 79 MMHG | BODY MASS INDEX: 31.65 KG/M2 | RESPIRATION RATE: 12 BRPM

## 2024-01-06 DIAGNOSIS — R89.9 ABNORMAL LABORATORY TEST: ICD-10-CM

## 2024-01-06 DIAGNOSIS — R06.00 DYSPNEA, UNSPECIFIED TYPE: ICD-10-CM

## 2024-01-06 DIAGNOSIS — R00.2 PALPITATION: Primary | ICD-10-CM

## 2024-01-06 LAB
ALBUMIN SERPL-MCNC: 4.5 G/DL (ref 3.5–5.2)
ALBUMIN/GLOB SERPL: 1.5 G/DL
ALP SERPL-CCNC: 96 U/L (ref 39–117)
ALT SERPL W P-5'-P-CCNC: 18 U/L (ref 1–33)
ANION GAP SERPL CALCULATED.3IONS-SCNC: 13.3 MMOL/L (ref 5–15)
AST SERPL-CCNC: 21 U/L (ref 1–32)
BASOPHILS # BLD AUTO: 0.03 10*3/MM3 (ref 0–0.2)
BASOPHILS NFR BLD AUTO: 0.5 % (ref 0–1.5)
BILIRUB SERPL-MCNC: 0.4 MG/DL (ref 0–1.2)
BUN SERPL-MCNC: 19 MG/DL (ref 8–23)
BUN/CREAT SERPL: 11.2 (ref 7–25)
CALCIUM SPEC-SCNC: 9.1 MG/DL (ref 8.6–10.5)
CHLORIDE SERPL-SCNC: 106 MMOL/L (ref 98–107)
CO2 SERPL-SCNC: 21.7 MMOL/L (ref 22–29)
CREAT SERPL-MCNC: 1.7 MG/DL (ref 0.57–1)
DEPRECATED RDW RBC AUTO: 45.1 FL (ref 37–54)
EGFRCR SERPLBLD CKD-EPI 2021: 34 ML/MIN/1.73
EOSINOPHIL # BLD AUTO: 0.13 10*3/MM3 (ref 0–0.4)
EOSINOPHIL NFR BLD AUTO: 2 % (ref 0.3–6.2)
ERYTHROCYTE [DISTWIDTH] IN BLOOD BY AUTOMATED COUNT: 14.4 % (ref 12.3–15.4)
GLOBULIN UR ELPH-MCNC: 3 GM/DL
GLUCOSE SERPL-MCNC: 107 MG/DL (ref 65–99)
HCT VFR BLD AUTO: 41.1 % (ref 34–46.6)
HGB BLD-MCNC: 13.3 G/DL (ref 12–15.9)
HOLD SPECIMEN: NORMAL
HOLD SPECIMEN: NORMAL
IMM GRANULOCYTES # BLD AUTO: 0.01 10*3/MM3 (ref 0–0.05)
IMM GRANULOCYTES NFR BLD AUTO: 0.2 % (ref 0–0.5)
LYMPHOCYTES # BLD AUTO: 2.96 10*3/MM3 (ref 0.7–3.1)
LYMPHOCYTES NFR BLD AUTO: 46 % (ref 19.6–45.3)
MCH RBC QN AUTO: 28 PG (ref 26.6–33)
MCHC RBC AUTO-ENTMCNC: 32.4 G/DL (ref 31.5–35.7)
MCV RBC AUTO: 86.5 FL (ref 79–97)
MONOCYTES # BLD AUTO: 0.32 10*3/MM3 (ref 0.1–0.9)
MONOCYTES NFR BLD AUTO: 5 % (ref 5–12)
NEUTROPHILS NFR BLD AUTO: 2.98 10*3/MM3 (ref 1.7–7)
NEUTROPHILS NFR BLD AUTO: 46.3 % (ref 42.7–76)
NRBC BLD AUTO-RTO: 0 /100 WBC (ref 0–0.2)
NT-PROBNP SERPL-MCNC: 41.1 PG/ML (ref 0–900)
PLATELET # BLD AUTO: 300 10*3/MM3 (ref 140–450)
PMV BLD AUTO: 10 FL (ref 6–12)
POTASSIUM SERPL-SCNC: 4 MMOL/L (ref 3.5–5.2)
PROT SERPL-MCNC: 7.5 G/DL (ref 6–8.5)
QT INTERVAL: 392 MS
QTC INTERVAL: 439 MS
RBC # BLD AUTO: 4.75 10*6/MM3 (ref 3.77–5.28)
SODIUM SERPL-SCNC: 141 MMOL/L (ref 136–145)
TROPONIN T SERPL HS-MCNC: 6 NG/L
WBC NRBC COR # BLD AUTO: 6.43 10*3/MM3 (ref 3.4–10.8)
WHOLE BLOOD HOLD COAG: NORMAL
WHOLE BLOOD HOLD SPECIMEN: NORMAL

## 2024-01-06 PROCEDURE — 99284 EMERGENCY DEPT VISIT MOD MDM: CPT

## 2024-01-06 PROCEDURE — 93005 ELECTROCARDIOGRAM TRACING: CPT | Performed by: EMERGENCY MEDICINE

## 2024-01-06 PROCEDURE — 96360 HYDRATION IV INFUSION INIT: CPT

## 2024-01-06 PROCEDURE — 74176 CT ABD & PELVIS W/O CONTRAST: CPT

## 2024-01-06 PROCEDURE — 80053 COMPREHEN METABOLIC PANEL: CPT

## 2024-01-06 PROCEDURE — 93005 ELECTROCARDIOGRAM TRACING: CPT

## 2024-01-06 PROCEDURE — 83880 ASSAY OF NATRIURETIC PEPTIDE: CPT

## 2024-01-06 PROCEDURE — 85025 COMPLETE CBC W/AUTO DIFF WBC: CPT

## 2024-01-06 PROCEDURE — 25810000003 SODIUM CHLORIDE 0.9 % SOLUTION

## 2024-01-06 PROCEDURE — 84484 ASSAY OF TROPONIN QUANT: CPT

## 2024-01-06 PROCEDURE — 71045 X-RAY EXAM CHEST 1 VIEW: CPT

## 2024-01-06 RX ORDER — SODIUM CHLORIDE 0.9 % (FLUSH) 0.9 %
10 SYRINGE (ML) INJECTION AS NEEDED
Status: DISCONTINUED | OUTPATIENT
Start: 2024-01-06 | End: 2024-01-07 | Stop reason: HOSPADM

## 2024-01-06 RX ADMIN — SODIUM CHLORIDE 1000 ML: 9 INJECTION, SOLUTION INTRAVENOUS at 20:24

## 2024-01-07 NOTE — DISCHARGE INSTRUCTIONS
Read and follow educational instructions provided to you in discharge packet. If symptoms worsen or fail to improve as anticipated return to ER.  Discontinue Paxlovid and may resume blood pressure medication starting tomorrow.  Schedule appointment with your PCP to discuss ER visit and decline in renal function,GFR.  Patient agrees to treatment plan.

## 2024-01-07 NOTE — ED PROVIDER NOTES
Time: 8:33 PM EST  Date of encounter:  1/6/2024  Independent Historian/Clinical History and Information was obtained by:   Patient  Chief Complaint: Shortness of breath and palpitation    History is limited by: N/A    History of Present Illness:  Patient is a 63 y.o. year old female who presents to the emergency department for evaluation of shortness of breath and palpitations.  Patient states she was diagnosed with COVID this past Thursday and was prescribed Paxlovid. States she was instructed to decrease amlodipine to 1/2 tablet daily while taking and no atorvastatin for 7 days.  States that she believes that the Paxlovid which is causing side effects and her blood pressure to raised since she had come off her blood pressure medications.    HPI    Patient Care Team  Primary Care Provider: Manoj Ybarra MD    Past Medical History:     Allergies   Allergen Reactions    Lisinopril Cough     Past Medical History:   Diagnosis Date    Arthritis     Dysuria     Essential (primary) hypertension     Family history of malignant neoplasm of breast     Fever     Fibromyalgia     Fibromyalgia, primary 2001    Foot pain, right     GERD without esophagitis     Low back pain     Mixed hyperlipidemia     Other fatigue     Other specified anxiety disorders     Spontaneous ecchymoses     Unspecified abdominal pain     Unspecified lump in the right breast, unspecified quadrant      Past Surgical History:   Procedure Laterality Date    CARDIAC CATHETERIZATION      CHOLECYSTECTOMY      COLONOSCOPY      ENDOMETRIAL ABLATION      TUBAL ABDOMINAL LIGATION       Family History   Problem Relation Age of Onset    Hyperlipidemia Mother     Diabetes type II Mother     Arthritis Mother     Heart disease Mother     Breast cancer Sister     Arthritis Sister     Cancer Sister     Arthritis Father        Home Medications:  Prior to Admission medications    Medication Sig Start Date End Date Taking? Authorizing Provider   amLODIPine  (NORVASC) 10 MG tablet Take 1 tablet by mouth Daily. 23   Manoj Ybarra MD   aspirin 81 MG chewable tablet Chew 1 tablet Daily.    Jeri Guerra MD   atorvastatin (LIPITOR) 20 MG tablet Take 1 tablet by mouth Daily. 23   Manoj Ybarra MD   baclofen (LIORESAL) 10 MG tablet Take 1 tablet by mouth Daily. 23   Jeri Guerra MD   carvedilol (COREG) 6.25 MG tablet Take 1 tablet by mouth 2 (Two) Times a Day. 23   Manoj Ybarra MD   DULoxetine (CYMBALTA) 30 MG capsule Take 1 capsule by mouth Daily. 23   Manoj Ybarra MD   losartan (COZAAR) 100 MG tablet Take 1 tablet by mouth Daily. 23   Manoj Ybarra MD   multivitamin with minerals tablet tablet Take 1 tablet by mouth Daily.    Jeri Guerra MD   Nirmatrelvir & Ritonavir, 300mg/100mg, (PAXLOVID) 20 x 150 MG & 10 x 100MG tablet therapy pack tablet Take 3 tablets by mouth 2 (Two) Times a Day. Decrease amlodipine to 1/2 tablet daily while taking.  No atorvastatin for 7 days. 24   Manoj Ybarra MD   omeprazole (priLOSEC) 40 MG capsule Take 1 capsule by mouth Daily. 23   Manoj Ybarra MD        Social History:   Social History     Tobacco Use    Smoking status: Former     Packs/day: 0.25     Years: 21.00     Additional pack years: 0.00     Total pack years: 5.25     Types: Cigarettes     Start date: 1992     Quit date: 2013     Years since quittin.0    Smokeless tobacco: Never    Tobacco comments:     off and on from her 30s    Vaping Use    Vaping Use: Never used   Substance Use Topics    Alcohol use: Yes     Comment: 4-5 times a week    Drug use: Never         Review of Systems:  Review of Systems   Constitutional:  Negative for chills and fever.   HENT:  Negative for congestion, ear pain and sore throat.    Eyes:  Negative for pain.   Respiratory:  Positive for shortness of breath. Negative for cough and chest tightness.   "  Cardiovascular:  Positive for palpitations. Negative for chest pain.   Gastrointestinal:  Negative for abdominal pain, diarrhea, nausea and vomiting.   Genitourinary:  Negative for flank pain and hematuria.   Musculoskeletal:  Negative for joint swelling.   Skin:  Negative for pallor.   Neurological:  Negative for seizures and headaches.   All other systems reviewed and are negative.         Physical Exam:  /80   Pulse 99   Temp 98.3 °F (36.8 °C) (Oral)   Resp 12   Ht 162.6 cm (64\")   Wt 84.1 kg (185 lb 6.5 oz)   SpO2 99%   BMI 31.83 kg/m²     Physical Exam  Vitals and nursing note reviewed.   Constitutional:       General: She is not in acute distress.     Appearance: Normal appearance. She is not ill-appearing, toxic-appearing or diaphoretic.   HENT:      Head: Normocephalic and atraumatic.      Nose: Congestion present. No rhinorrhea.      Mouth/Throat:      Mouth: Mucous membranes are moist.   Eyes:      General: No scleral icterus.     Conjunctiva/sclera: Conjunctivae normal.   Cardiovascular:      Rate and Rhythm: Normal rate and regular rhythm.      Pulses: Normal pulses.      Heart sounds: Normal heart sounds. No murmur heard.     No gallop.   Pulmonary:      Effort: Pulmonary effort is normal. No respiratory distress.      Breath sounds: Examination of the right-upper field reveals wheezing. Examination of the left-upper field reveals wheezing. Examination of the right-lower field reveals wheezing. Examination of the left-lower field reveals wheezing. Wheezing present.   Abdominal:      General: Abdomen is flat. Bowel sounds are normal.      Palpations: Abdomen is soft.      Tenderness: There is generalized abdominal tenderness. There is no guarding.   Musculoskeletal:         General: Normal range of motion.      Cervical back: Normal range of motion and neck supple.   Skin:     General: Skin is warm and dry.      Capillary Refill: Capillary refill takes less than 2 seconds.   Neurological: "      General: No focal deficit present.      Mental Status: She is alert and oriented to person, place, and time. Mental status is at baseline.   Psychiatric:         Mood and Affect: Mood normal.         Judgment: Judgment normal.         Vital signs were reviewed under triage note.            Procedures:  Procedures      Medical Decision Making:      Comorbidities that affect care:    Hypertension, hyperlipidemia, fever, fibromyalgia    External Notes reviewed:    Previous Clinic Note: 1 4 2020 for telehealth visit diagnosis of COVID      The following orders were placed and all results were independently analyzed by me:  Orders Placed This Encounter   Procedures    XR Chest 1 View    CT Abdomen Pelvis Without Contrast    [facility] Draw    Comprehensive Metabolic Panel    BNP    Single High Sensitivity Troponin T    CBC Auto Differential    NPO Diet NPO Type: Strict NPO    Undress & Gown    Continuous Pulse Oximetry    Vital Signs    Oxygen Therapy- Nasal Cannula; Titrate 1-6 LPM Per SpO2; 90 - 95%    ECG 12 Lead ED Triage Standing Order; SOA    Insert Peripheral IV    CBC & Differential    Green Top (Gel)    Lavender Top    Gold Top - SST    Light Blue Top       Medications Given in the Emergency Department:  Medications   sodium chloride 0.9 % flush 10 mL (has no administration in time range)   sodium chloride 0.9 % bolus 1,000 mL (0 mL Intravenous Stopped 1/6/24 2146)        ED Course:         Labs:    Lab Results (last 24 hours)       Procedure Component Value Units Date/Time    CBC & Differential [[id number]]  (Abnormal) Collected: 01/06/24 1752    Specimen: Blood Updated: 01/06/24 1805    Narrative:      The following orders were created for panel order CBC & Differential.  Procedure                               Abnormality         Status                     ---------                               -----------         ------                     CBC Auto Differential[[id number]]        Abnormal            Final  result                 Please view results for these tests on the individual orders.    Comprehensive Metabolic Panel [[id number]]  (Abnormal) Collected: 01/06/24 1752    Specimen: Blood Updated: 01/06/24 1826     Glucose 107 mg/dL      BUN 19 mg/dL      Creatinine 1.70 mg/dL      Sodium 141 mmol/L      Potassium 4.0 mmol/L      Chloride 106 mmol/L      CO2 21.7 mmol/L      Calcium 9.1 mg/dL      Total Protein 7.5 g/dL      Albumin 4.5 g/dL      ALT (SGPT) 18 U/L      AST (SGOT) 21 U/L      Alkaline Phosphatase 96 U/L      Total Bilirubin 0.4 mg/dL      Globulin 3.0 gm/dL      A/G Ratio 1.5 g/dL      BUN/Creatinine Ratio 11.2     Anion Gap 13.3 mmol/L      eGFR 34.0 mL/min/1.73     Narrative:      GFR Normal >60  Chronic Kidney Disease <60  Kidney Failure <15      BNP [[id number]]  (Normal) Collected: 01/06/24 1752    Specimen: Blood Updated: 01/06/24 1824     proBNP 41.1 pg/mL     Narrative:      This assay is used as an aid in the diagnosis of individuals suspected of having heart failure. It can be used as an aid in the diagnosis of acute decompensated heart failure (ADHF) in patients presenting with signs and symptoms of ADHF to the emergency department (ED). In addition, NT-proBNP of <300 pg/mL indicates ADHF is not likely.    Age Range Result Interpretation  NT-proBNP Concentration (pg/mL:      <50             Positive            >450                   Gray                 300-450                    Negative             <300    50-75           Positive            >900                  Gray                300-900                  Negative            <300      >75             Positive            >1800                  Gray                300-1800                  Negative            <300    Single High Sensitivity Troponin T [[id number]]  (Normal) Collected: 01/06/24 1752    Specimen: Blood Updated: 01/06/24 1826     HS Troponin T 6 ng/L     Narrative:      High Sensitive Troponin T Reference Range:  <14.0 ng/L-  Negative Female for AMI  <22.0 ng/L- Negative Male for AMI  >=14 - Abnormal Female indicating possible myocardial injury.  >=22 - Abnormal Male indicating possible myocardial injury.   Clinicians would have to utilize clinical acumen, EKG, Troponin, and serial changes to determine if it is an Acute Myocardial Infarction or myocardial injury due to an underlying chronic condition.         CBC Auto Differential [[id number]]  (Abnormal) Collected: 01/06/24 1752    Specimen: Blood Updated: 01/06/24 1805     WBC 6.43 10*3/mm3      RBC 4.75 10*6/mm3      Hemoglobin 13.3 g/dL      Hematocrit 41.1 %      MCV 86.5 fL      MCH 28.0 pg      MCHC 32.4 g/dL      RDW 14.4 %      RDW-SD 45.1 fl      MPV 10.0 fL      Platelets 300 10*3/mm3      Neutrophil % 46.3 %      Lymphocyte % 46.0 %      Monocyte % 5.0 %      Eosinophil % 2.0 %      Basophil % 0.5 %      Immature Grans % 0.2 %      Neutrophils, Absolute 2.98 10*3/mm3      Lymphocytes, Absolute 2.96 10*3/mm3      Monocytes, Absolute 0.32 10*3/mm3      Eosinophils, Absolute 0.13 10*3/mm3      Basophils, Absolute 0.03 10*3/mm3      Immature Grans, Absolute 0.01 10*3/mm3      nRBC 0.0 /100 WBC              Imaging:    CT Abdomen Pelvis Without Contrast    Result Date: 1/6/2024  PROCEDURE: CT ABDOMEN PELVIS WO CONTRAST  COMPARISON: None.  INDICATIONS: GENERALIZED ABDOMINAL PAIN. POSITIVE COVID-19 INFECTION.  TECHNIQUE: 750 CT images were created without intravenous or oral contrast agent administration.   PROTOCOL:   Standard CT imaging protocol performed.    RADIATION:   Total DLP: 617.5 mGy*cm.   Automated exposure control was utilized to minimize radiation dose.  FINDINGS:  LIVER: Normal.  No enlargement, atrophy, abnormal density, or significant focal lesion.  BILIARY: The patient has undergone cholecystectomy. PANCREAS: Normal.  No lesion, fluid collection, ductal dilatation, or atrophy.  No acute pancreatitis is suggested. SPLEEN: Normal.  No enlargement or focal lesion.   KIDNEYS: Normal.  No mass, obstruction, or calcification.  No hydronephrosis is suspected. ADRENALS: Normal.  No mass or enlargement.  AORTA/VASCULAR: Normal.  No aneurysm.  Atherosclerotic changes involve the aortoiliac arterial system without aneurysmal dilatation. RETROPERITONEUM: Normal.  No mass or adenopathy.  BOWEL/MESENTERY: Normal.  No visible mass, obstruction, or bowel wall thickening.  Nonobstructing appendicoliths are identified.  No acute appendicitis.  There are scattered colonic diverticula without acute colonic diverticulitis.  No pneumoperitoneum or pneumatosis. ABDOMINAL WALL: A tiny fat-containing umbilical hernia is seen.  It does not contain bowel. URINARY BLADDER: Normal.  No visible focal wall thickening, lesion, or calculus.  PELVIC NODES: Normal.  No adenopathy.  PELVIC ORGANS: Normal.  No visible mass.  Pelvic organs appropriate for patient age.  BONES: No bony lesion or acute fracture.  Bilateral facet osteoarthritis is seen at L5-S1 with mild chronic anterolisthesis. LUNG BASES: Normal.  No visible pulmonary or pleural disease.  OTHER: There are pelvic phleboliths.       No acute findings are seen by nonenhanced CT examination of the abdomen and pelvis.     Please note that portions of this note were completed with a voice recognition program.  JULIETA SOL JR, MD       Electronically Signed and Approved By: JULIETA SOL JR, MD on 1/06/2024 at 22:34              XR Chest 1 View    Result Date: 1/6/2024  PROCEDURE: XR CHEST 1 VW  COMPARISON: [facility] , XR CHEST PA AND LATERAL, 11/04/2021, 16:46.  INDICATIONS: SOA Triage Protocol  FINDINGS:  Lungs are adequately expanded and appear clear.  No pneumothorax or large pleural effusion is seen.  Cardiomediastinal contours appear stable.        No acute cardiopulmonary abnormality is identified.       ANDREA CORTEZ MD       Electronically Signed and Approved By: ANDREA CORTEZ MD on 1/06/2024 at 18:29                 Differential Diagnosis and Discussion:    Dyspnea: Differential diagnosis includes but is not limited to metabolic acidosis, neurological disorders, psychogenic, asthma, pneumothorax, upper airway obstruction, COPD, pneumonia, noncardiogenic pulmonary edema, interstitial lung disease, anemia, congestive heart failure, and pulmonary embolism  Palpitations: Differential diagnosis includes but is not limited to anxiety, atrioventricular blocks, mitral valve disease, hypoxia, coronary artery disease, hypokalemia, anemia, fever, COPD, congestive heart failure, pericarditis, Richard-Parkinson-White syndrome, pulmonary embolism, SVT, atrial fibrillation, atrial flutter, sinus tachycardia, thyrotoxicosis, and pheochromocytoma.    All labs were reviewed and interpreted by me.  All X-rays impressions were independently interpreted by me.  EKG was interpreted by supervising attending.  CT scan radiology impression was interpreted by me.    MDM     Amount and/or Complexity of Data Reviewed  Clinical lab tests: reviewed  Tests in the radiology section of CPT®: reviewed  Tests in the medicine section of CPT®: reviewed    Risk of Complications, Morbidity, and/or Mortality  Presenting problems: moderate  Diagnostic procedures: moderate  Management options: moderate         Patient Care Considerations:    Consider antibiotics however there is no signs or symptoms of bacterial infection    Consultants/Shared Management Plan:    None    Social Determinants of Health:    Patient is independent, reliable, and has access to care.       Disposition and Care Coordination:    Discharged: The patient is suitable and stable for discharge with no need for consideration of observation or admission.    [unfilled]  I have explained discharge medications and the need for follow up with the patient/caretakers. This was also printed in the discharge instructions. Patient was discharged with the following medications and follow up:       Medication List        Stop      Paxlovid (300/100) 20 x 150 MG & 10 x 100MG tablet therapy pack tablet  Generic drug: Nirmatrelvir & Ritonavir (300mg/100mg)           Manoj Ybarra MD  [address] [place] [address]  [phone]      To discuss ER visit       Final diagnoses:   Palpitation   Dyspnea, unspecified type   Abnormal laboratory test        ED Disposition       ED Disposition   Discharge    Condition   Stable    Comment   --               This medical record created using voice recognition software.             Kallie Cruz, APRN  01/06/24 [id number]

## 2024-01-09 ENCOUNTER — LAB (OUTPATIENT)
Dept: LAB | Facility: HOSPITAL | Age: 64
End: 2024-01-09
Payer: MEDICARE

## 2024-01-09 ENCOUNTER — OFFICE VISIT (OUTPATIENT)
Dept: FAMILY MEDICINE CLINIC | Age: 64
End: 2024-01-09
Payer: MEDICARE

## 2024-01-09 VITALS
BODY MASS INDEX: 32.23 KG/M2 | SYSTOLIC BLOOD PRESSURE: 147 MMHG | HEART RATE: 67 BPM | DIASTOLIC BLOOD PRESSURE: 83 MMHG | HEIGHT: 64 IN | TEMPERATURE: 97.8 F | OXYGEN SATURATION: 99 % | WEIGHT: 188.8 LBS

## 2024-01-09 DIAGNOSIS — U07.1 COVID-19: Primary | ICD-10-CM

## 2024-01-09 DIAGNOSIS — R79.89 ELEVATED SERUM CREATININE: ICD-10-CM

## 2024-01-09 LAB
AMORPH URATE CRY URNS QL MICRO: NORMAL /HPF
ANION GAP SERPL CALCULATED.3IONS-SCNC: 10.2 MMOL/L (ref 5–15)
BACTERIA UR QL AUTO: NORMAL /HPF
BILIRUB UR QL STRIP: NEGATIVE
BUN SERPL-MCNC: 21 MG/DL (ref 8–23)
BUN/CREAT SERPL: 26.9 (ref 7–25)
CALCIUM SPEC-SCNC: 9.7 MG/DL (ref 8.6–10.5)
CHLORIDE SERPL-SCNC: 107 MMOL/L (ref 98–107)
CLARITY UR: ABNORMAL
CO2 SERPL-SCNC: 23.8 MMOL/L (ref 22–29)
COLOR UR: YELLOW
CREAT SERPL-MCNC: 0.78 MG/DL (ref 0.57–1)
EGFRCR SERPLBLD CKD-EPI 2021: 86.5 ML/MIN/1.73
GLUCOSE SERPL-MCNC: 95 MG/DL (ref 65–99)
GLUCOSE UR STRIP-MCNC: NEGATIVE MG/DL
HGB UR QL STRIP.AUTO: NEGATIVE
KETONES UR QL STRIP: NEGATIVE
LEUKOCYTE ESTERASE UR QL STRIP.AUTO: ABNORMAL
NITRITE UR QL STRIP: NEGATIVE
PH UR STRIP.AUTO: 5.5 [PH] (ref 5–8)
POTASSIUM SERPL-SCNC: 4.4 MMOL/L (ref 3.5–5.2)
PROT UR QL STRIP: ABNORMAL
RBC # UR STRIP: NORMAL /HPF
REF LAB TEST METHOD: NORMAL
SODIUM SERPL-SCNC: 141 MMOL/L (ref 136–145)
SP GR UR STRIP: 1.02 (ref 1–1.03)
SQUAMOUS #/AREA URNS HPF: NORMAL /HPF
UROBILINOGEN UR QL STRIP: ABNORMAL
WBC # UR STRIP: NORMAL /HPF

## 2024-01-09 PROCEDURE — 36415 COLL VENOUS BLD VENIPUNCTURE: CPT

## 2024-01-09 PROCEDURE — 99214 OFFICE O/P EST MOD 30 MIN: CPT | Performed by: PHYSICIAN ASSISTANT

## 2024-01-09 PROCEDURE — 3079F DIAST BP 80-89 MM HG: CPT | Performed by: PHYSICIAN ASSISTANT

## 2024-01-09 PROCEDURE — 80048 BASIC METABOLIC PNL TOTAL CA: CPT

## 2024-01-09 PROCEDURE — 3077F SYST BP >= 140 MM HG: CPT | Performed by: PHYSICIAN ASSISTANT

## 2024-01-09 PROCEDURE — 1160F RVW MEDS BY RX/DR IN RCRD: CPT | Performed by: PHYSICIAN ASSISTANT

## 2024-01-09 PROCEDURE — 1159F MED LIST DOCD IN RCRD: CPT | Performed by: PHYSICIAN ASSISTANT

## 2024-01-09 PROCEDURE — 81001 URINALYSIS AUTO W/SCOPE: CPT

## 2024-01-09 RX ORDER — BACLOFEN 10 MG/1
10 TABLET ORAL 3 TIMES DAILY
Qty: 15 TABLET | Refills: 0 | Status: SHIPPED | OUTPATIENT
Start: 2024-01-09

## 2024-01-09 RX ORDER — AZELASTINE 1 MG/ML
2 SPRAY, METERED NASAL 2 TIMES DAILY
Qty: 30 ML | Refills: 0 | Status: SHIPPED | OUTPATIENT
Start: 2024-01-09 | End: 2024-01-09 | Stop reason: SDUPTHER

## 2024-01-09 RX ORDER — BACLOFEN 10 MG/1
10 TABLET ORAL 3 TIMES DAILY
Qty: 15 TABLET | Refills: 0 | Status: SHIPPED | OUTPATIENT
Start: 2024-01-09 | End: 2024-01-09 | Stop reason: SDUPTHER

## 2024-01-09 RX ORDER — AZELASTINE 1 MG/ML
2 SPRAY, METERED NASAL 2 TIMES DAILY
Qty: 30 ML | Refills: 0 | Status: SHIPPED | OUTPATIENT
Start: 2024-01-09

## 2024-01-09 NOTE — PATIENT INSTRUCTIONS
If you test positive for COVID-19, stay home for at least 5 days and isolate from others in your home. You are likely most infectious during these first 5 days.  Wear a high-quality mask if you must be around others at home and in public.  Do not go places where you are unable to wear a mask. For travel guidance, see [facility]’s Travel webpage.   Do not travel.  Stay home and separate from others as much as possible.  Use a separate bathroom, if possible.  Take steps to improve ventilation at home, if possible.  Don’t share personal household items, like cups, towels, and utensils.  Monitor your symptoms. If you have an emergency warning sign (like trouble breathing), seek emergency medical care immediately.    End isolation based on how serious your COVID-19 symptoms were. Loss of taste and smell may persist for weeks or months after recovery and need not delay the end of isolation.  If you had no symptoms you may end isolation after day 5.  If you had symptoms and your symptoms are improving, you may end isolation after day 5 if you are fever-free for 24 hours (without the use of fever-reducing medication).  If you had symptoms and your symptoms are not improving continue to isolate until you are fever-free for 24 hours (without the use of fever-reducing medication) and your symptoms are improving.  If you had symptoms and had moderate illness (you experienced shortness of breath or had difficulty breathing) you need to isolate through day 10.  If you had symptoms and had severe illness (you were hospitalized) or have a weakened immune system you need to isolate through day 10. Consult your doctor before ending isolation. Ending isolation without a viral test may not be an option for you.    Regardless of when you end isolation until at least day 11:  Avoid being around people who are more likely to get very sick from COVID-19.  Remember to wear a high-quality mask when indoors around others at home and in public.  Do  not go places where you are unable to wear a mask until you are able to discontinue masking.   For travel guidance, see CDC’s Travel webpage.    If you have access to antigen (rapid) tests, you should consider using them. With two sequential negative tests 48 hours apart, you may remove your mask sooner than day 10.  Note: If your antigen test results are positive, you may still be infectious. You should continue wearing a mask and wait at least 48 hours before taking another test. Continue taking antigen tests at least 48 hours apart until you have two sequential negative results. This may mean you need to continue wearing a mask and testing beyond day 10.    After you have ended isolation, if your COVID-19 symptoms recur or worsen, restart your isolation at day 0. Talk to a healthcare provider if you have questions about your symptoms or when to end isolation.

## 2024-01-09 NOTE — PROGRESS NOTES
Diagnoses and all orders for this visit:    1. COVID-19 (Primary)  Comments:  Reviewed isolation guidelines. Since she is still symptomatic, I recommended she continue isolating for 10 days or until she feels better. Guidelines provided.  Orders:  -     Discontinue: baclofen (LIORESAL) 10 MG tablet; Take 1 tablet by mouth 3 (Three) Times a Day.  Dispense: 15 tablet; Refill: 0  -     Discontinue: azelastine (ASTELIN) 0.1 % nasal spray; 2 sprays into the nostril(s) as directed by provider 2 (Two) Times a Day. Use in each nostril as directed  Dispense: 30 mL; Refill: 0  -     azelastine (ASTELIN) 0.1 % nasal spray; 2 sprays into the nostril(s) as directed by provider 2 (Two) Times a Day. Use in each nostril as directed  Dispense: 30 mL; Refill: 0  -     baclofen (LIORESAL) 10 MG tablet; Take 1 tablet by mouth 3 (Three) Times a Day.  Dispense: 15 tablet; Refill: 0    2. Elevated serum creatinine  Comments:  Encouraged to force fluids. This may be secondary to Paxlovid or dehydration. Will repeat today.  Orders:  -     Basic metabolic panel; Future  -     Urinalysis With Microscopic - Urine, Clean Catch; Future            Subjective     CHIEF COMPLAINT    Chief Complaint   Patient presents with    ER follow up     Reaction to paxlovid             History of Present Illness  This is a 63-year-old female, diagnosed with COVID on 1/4/2024, presenting to the clinic for an ER follow-up.  She presented to the emergency department with palpitations. This was ultimately discovered to be a PAC which they thought was secondary to Paxlovid and changing her blood pressure medications to take it.  She has continued to have some palpitations but they have decreased significantly.  In the ER she was also found to have elevated creatinine with decreased GFR.  Chart review shows no history of kidney dysfunction previously.  She does still have COVID symptoms and states she tested positive for COVID with an at-home test this  morning.            Review of Systems   Constitutional:  Negative for chills, fatigue and fever.   HENT:  Positive for congestion, ear pain (left ear) and sore throat. Negative for rhinorrhea.    Respiratory:  Positive for cough. Negative for shortness of breath and wheezing.    Cardiovascular:  Negative for chest pain.   Gastrointestinal:  Positive for abdominal pain. Negative for diarrhea, nausea and vomiting.   Musculoskeletal:  Negative for myalgias.   Skin:  Negative for rash.   Neurological:  Positive for dizziness. Negative for headaches.            Past Medical History:   Diagnosis Date    Arthritis     Dysuria     Essential (primary) hypertension     Family history of malignant neoplasm of breast     Fever     Fibromyalgia     Fibromyalgia, primary     Foot pain, right     GERD without esophagitis     Low back pain     Mixed hyperlipidemia     Other fatigue     Other specified anxiety disorders     Spontaneous ecchymoses     Unspecified abdominal pain     Unspecified lump in the right breast, unspecified quadrant             Past Surgical History:   Procedure Laterality Date    CARDIAC CATHETERIZATION      CHOLECYSTECTOMY      COLONOSCOPY      ENDOMETRIAL ABLATION      TUBAL ABDOMINAL LIGATION              Family History   Problem Relation Age of Onset    Hyperlipidemia Mother     Diabetes type II Mother     Arthritis Mother     Heart disease Mother     Breast cancer Sister     Arthritis Sister     Cancer Sister     Arthritis Father             Social History     Socioeconomic History    Marital status:     Number of children: 2   Tobacco Use    Smoking status: Former     Packs/day: 0.25     Years: 21.00     Additional pack years: 0.00     Total pack years: 5.25     Types: Cigarettes     Start date: 1992     Quit date: 2013     Years since quittin.0    Smokeless tobacco: Never    Tobacco comments:     off and on from her 30s    Vaping Use    Vaping Use: Never used   Substance and  "Sexual Activity    Alcohol use: Yes     Comment: 4-5 times a week    Drug use: Never    Sexual activity: Not Currently     Partners: Male     Birth control/protection: None            Allergies   Allergen Reactions    Lisinopril Cough            Current Outpatient Medications on File Prior to Visit   Medication Sig Dispense Refill    amLODIPine (NORVASC) 10 MG tablet Take 1 tablet by mouth Daily. 90 tablet 3    aspirin 81 MG chewable tablet Chew 1 tablet Daily.      atorvastatin (LIPITOR) 20 MG tablet Take 1 tablet by mouth Daily. 90 tablet 3    carvedilol (COREG) 6.25 MG tablet Take 1 tablet by mouth 2 (Two) Times a Day. 180 tablet 3    DULoxetine (CYMBALTA) 30 MG capsule Take 1 capsule by mouth Daily. 90 capsule 3    losartan (COZAAR) 100 MG tablet Take 1 tablet by mouth Daily. 90 tablet 3    multivitamin with minerals tablet tablet Take 1 tablet by mouth Daily.      omeprazole (priLOSEC) 40 MG capsule Take 1 capsule by mouth Daily. 90 capsule 3    [DISCONTINUED] baclofen (LIORESAL) 10 MG tablet Take 1 tablet by mouth Daily. (Patient not taking: Reported on 1/9/2024)       No current facility-administered medications on file prior to visit.            /83 (BP Location: Right arm, Patient Position: Sitting)   Pulse 67   Temp 97.8 °F (36.6 °C) (Oral)   Ht 162.6 cm (64.02\")   Wt 85.6 kg (188 lb 12.8 oz)   SpO2 99%   BMI 32.39 kg/m²          Objective     Physical Exam  Vitals and nursing note reviewed.   Constitutional:       General: She is not in acute distress.     Appearance: Normal appearance.   HENT:      Head: Normocephalic and atraumatic.      Right Ear: Tympanic membrane, ear canal and external ear normal.      Left Ear: Tympanic membrane, ear canal and external ear normal.      Nose: No congestion or rhinorrhea.      Mouth/Throat:      Mouth: Mucous membranes are moist.      Pharynx: Oropharynx is clear. No posterior oropharyngeal erythema.   Eyes:      Extraocular Movements: Extraocular " movements intact.      Conjunctiva/sclera: Conjunctivae normal.      Pupils: Pupils are equal, round, and reactive to light.   Cardiovascular:      Rate and Rhythm: Normal rate and regular rhythm.      Heart sounds: Normal heart sounds.   Pulmonary:      Effort: Pulmonary effort is normal. No respiratory distress.      Breath sounds: Normal breath sounds. No wheezing.   Abdominal:      General: There is no distension.      Palpations: Abdomen is soft.      Tenderness: There is no abdominal tenderness.   Musculoskeletal:      Cervical back: Normal range of motion. No rigidity.   Skin:     General: Skin is warm and dry.   Neurological:      Mental Status: She is alert and oriented to person, place, and time.   Psychiatric:         Mood and Affect: Mood normal.         Behavior: Behavior normal.                    Lab Results (last 24 hours)       Procedure Component Value Units Date/Time    Basic metabolic panel [[id number]] Collected: 01/09/24 1436    Specimen: Blood Updated: 01/09/24 1436    Urinalysis With Microscopic - Urine, Clean Catch [[id number]]  (Abnormal) Collected: 01/09/24 1436    Specimen: Urine, Clean Catch Updated: 01/09/24 1456    Narrative:      The following orders were created for panel order Urinalysis With Microscopic - Urine, Clean Catch.  Procedure                               Abnormality         Status                     ---------                               -----------         ------                     Urinalysis without micro...[[id number]]  Abnormal            Final result               Urinalysis, Microscopic ...[[id number]]                      Final result                 Please view results for these tests on the individual orders.    Urinalysis without microscopic (no culture) - Urine, Clean Catch [[id number]]  (Abnormal) Collected: 01/09/24 1436    Specimen: Urine, Clean Catch Updated: 01/09/24 1446     Color, UA Yellow     Appearance, UA Slightly Cloudy     pH, UA 5.5     Specific  Gravity, UA 1.025     Glucose, UA Negative     Ketones, UA Negative     Bilirubin, UA Negative     Blood, UA Negative     Protein, UA Trace     Leuk Esterase, UA Trace     Nitrite, UA Negative     Urobilinogen, UA 0.2 E.U./dL    Urinalysis, Microscopic Only - Urine, Clean Catch [[id number]] Collected: 01/09/24 1436    Specimen: Urine, Clean Catch Updated: 01/09/24 1456     RBC, UA None Seen /HPF      WBC, UA 0-2 /HPF      Bacteria, UA None Seen /HPF      Squamous Epithelial Cells, UA 0-2 /HPF      Amorphous Crystals, UA Small/1+ /HPF      Methodology Manual Light Microscopy          The following data was reviewed for this visit:  Comprehensive Metabolic Panel (01/06/2024 17:52)  Telemedicine with Manoj Ybarra MD (01/04/2024)  ED with Bala Cardenas DO (01/06/2024)                Diagnoses and all orders for this visit:    1. COVID-19 (Primary)  Comments:  Reviewed isolation guidelines. Since she is still symptomatic, I recommended she continue isolating for 10 days or until she feels better. Guidelines provided.  Orders:  -     Discontinue: baclofen (LIORESAL) 10 MG tablet; Take 1 tablet by mouth 3 (Three) Times a Day.  Dispense: 15 tablet; Refill: 0  -     Discontinue: azelastine (ASTELIN) 0.1 % nasal spray; 2 sprays into the nostril(s) as directed by provider 2 (Two) Times a Day. Use in each nostril as directed  Dispense: 30 mL; Refill: 0  -     azelastine (ASTELIN) 0.1 % nasal spray; 2 sprays into the nostril(s) as directed by provider 2 (Two) Times a Day. Use in each nostril as directed  Dispense: 30 mL; Refill: 0  -     baclofen (LIORESAL) 10 MG tablet; Take 1 tablet by mouth 3 (Three) Times a Day.  Dispense: 15 tablet; Refill: 0    2. Elevated serum creatinine  Comments:  Encouraged to force fluids. This may be secondary to Paxlovid or dehydration. Will repeat today.  Orders:  -     Basic metabolic panel; Future  -     Urinalysis With Microscopic - Urine, Clean Catch; Future             Additional  Instructions for the Follow-ups that You Need to Schedule       Basic metabolic panel    Jan 14, 2024 (Approximate)      Release to patient: Routine Release                          FOR FULL DISCHARGE INSTRUCTIONS/COMMENTS/HANDOUTS please see the   AVS

## 2024-01-14 LAB
QT INTERVAL: 392 MS
QTC INTERVAL: 439 MS

## 2024-02-22 ENCOUNTER — TELEPHONE (OUTPATIENT)
Dept: FAMILY MEDICINE CLINIC | Age: 64
End: 2024-02-22
Payer: MEDICARE

## 2024-02-22 ENCOUNTER — TRANSCRIBE ORDERS (OUTPATIENT)
Dept: FAMILY MEDICINE CLINIC | Age: 64
End: 2024-02-22

## 2024-02-22 ENCOUNTER — OFFICE VISIT (OUTPATIENT)
Dept: FAMILY MEDICINE CLINIC | Age: 64
End: 2024-02-22
Payer: MEDICARE

## 2024-02-22 ENCOUNTER — LAB (OUTPATIENT)
Dept: LAB | Facility: HOSPITAL | Age: 64
End: 2024-02-22
Payer: MEDICARE

## 2024-02-22 VITALS
BODY MASS INDEX: 32.98 KG/M2 | WEIGHT: 193.2 LBS | OXYGEN SATURATION: 98 % | TEMPERATURE: 98.6 F | HEART RATE: 64 BPM | HEIGHT: 64 IN | SYSTOLIC BLOOD PRESSURE: 129 MMHG | DIASTOLIC BLOOD PRESSURE: 72 MMHG

## 2024-02-22 DIAGNOSIS — N63.10 LARGE MASS OF RIGHT BREAST: Primary | ICD-10-CM

## 2024-02-22 DIAGNOSIS — N63.15 UNSPECIFIED LUMP IN THE RIGHT BREAST, OVERLAPPING QUADRANTS: ICD-10-CM

## 2024-02-22 DIAGNOSIS — R10.11 RUQ ABDOMINAL PAIN: ICD-10-CM

## 2024-02-22 LAB
ALBUMIN SERPL-MCNC: 4.9 G/DL (ref 3.5–5.2)
ALBUMIN/GLOB SERPL: 2.5 G/DL
ALP SERPL-CCNC: 93 U/L (ref 39–117)
ALT SERPL W P-5'-P-CCNC: 20 U/L (ref 1–33)
AMYLASE SERPL-CCNC: 71 U/L (ref 28–100)
ANION GAP SERPL CALCULATED.3IONS-SCNC: 11 MMOL/L (ref 5–15)
AST SERPL-CCNC: 22 U/L (ref 1–32)
BILIRUB SERPL-MCNC: 0.6 MG/DL (ref 0–1.2)
BUN SERPL-MCNC: 12 MG/DL (ref 8–23)
BUN/CREAT SERPL: 12.8 (ref 7–25)
CALCIUM SPEC-SCNC: 9.6 MG/DL (ref 8.6–10.5)
CHLORIDE SERPL-SCNC: 105 MMOL/L (ref 98–107)
CO2 SERPL-SCNC: 27 MMOL/L (ref 22–29)
CREAT SERPL-MCNC: 0.94 MG/DL (ref 0.57–1)
EGFRCR SERPLBLD CKD-EPI 2021: 69.2 ML/MIN/1.73
GLOBULIN UR ELPH-MCNC: 2 GM/DL
GLUCOSE SERPL-MCNC: 96 MG/DL (ref 65–99)
LIPASE SERPL-CCNC: 20 U/L (ref 13–60)
POTASSIUM SERPL-SCNC: 4.7 MMOL/L (ref 3.5–5.2)
PROT SERPL-MCNC: 6.9 G/DL (ref 6–8.5)
SODIUM SERPL-SCNC: 143 MMOL/L (ref 136–145)

## 2024-02-22 PROCEDURE — 80053 COMPREHEN METABOLIC PANEL: CPT

## 2024-02-22 PROCEDURE — 36415 COLL VENOUS BLD VENIPUNCTURE: CPT

## 2024-02-22 PROCEDURE — 1160F RVW MEDS BY RX/DR IN RCRD: CPT | Performed by: NURSE PRACTITIONER

## 2024-02-22 PROCEDURE — 3074F SYST BP LT 130 MM HG: CPT | Performed by: NURSE PRACTITIONER

## 2024-02-22 PROCEDURE — 82150 ASSAY OF AMYLASE: CPT

## 2024-02-22 PROCEDURE — 3078F DIAST BP <80 MM HG: CPT | Performed by: NURSE PRACTITIONER

## 2024-02-22 PROCEDURE — 83690 ASSAY OF LIPASE: CPT

## 2024-02-22 PROCEDURE — 1159F MED LIST DOCD IN RCRD: CPT | Performed by: NURSE PRACTITIONER

## 2024-02-22 PROCEDURE — 99213 OFFICE O/P EST LOW 20 MIN: CPT | Performed by: NURSE PRACTITIONER

## 2024-02-22 NOTE — TELEPHONE ENCOUNTER
Caller: Chely Calderón    Relationship: Self    Best call back number: [phone]     Who are you requesting to speak with (clinical staff, provider,  specific staff member):     What was the call regarding: PATIENT WAS SCHEDULED FOR A MAMMOGRAM AND ULTRASOUND AT [facility]. IN ORDER FOR THE TESTS TO BE COVERED UNDER HER INSURANCE, THEY HAVE TO BE SCHEDULED AT [facility].

## 2024-02-22 NOTE — PROGRESS NOTES
"Chief Complaint  Breast Mass (Located under right breast; pt states that she noticed it last night )    Subjective    Patient is in today with complaints of finding a breast mass underneath her right breast yesterday, and is having right upper quadrant pain that radiates into her back.  She reports that in September she had an abnormal mammogram, and later had to have a biopsy, from that she developed a seroma that sent her to the emergency room in December.  She is seeing a surgeon, and has a follow-up with them scheduled on March 5.  She reports that she has had her gallbladder removed.  Due to an abnormal mammogram she is also had recent labs and a CT scan of her abdomen and pelvis that were normal.  She denies any fever, but does report that she just feels tired.        Chely Calderón presents to [facility]  Breast Mass  This is a new problem. The current episode started yesterday. Associated symptoms include abdominal pain. Pertinent negatives include no nausea or vomiting.   Abdominal Pain  This is a new problem. The current episode started 1 to 4 weeks ago. The pain is located in the RUQ and LUQ. The quality of the pain is burning. The abdominal pain radiates to the back. Pertinent negatives include no constipation, diarrhea, nausea or vomiting. The pain is relieved by Nothing. She has tried proton pump inhibitors for the symptoms.       Objective   Vital Signs:  /72 (BP Location: Left arm, Patient Position: Sitting, Cuff Size: Large Adult)   Pulse 64   Temp 98.6 °F (37 °C) (Oral)   Ht 162.6 cm (64.02\")   Wt 87.6 kg (193 lb 3.2 oz)   SpO2 98% Comment: room air  BMI 33.14 kg/m²   Estimated body mass index is 33.14 kg/m² as calculated from the following:    Height as of this encounter: 162.6 cm (64.02\").    Weight as of this encounter: 87.6 kg (193 lb 3.2 oz).               Physical Exam  HENT:      Head: Normocephalic.   Cardiovascular:      Rate and Rhythm: " Normal rate.   Pulmonary:      Effort: Pulmonary effort is normal.   Chest:      Chest wall: Edema present.   Breasts:     Right: Mass present. No inverted nipple or nipple discharge.       Lymphadenopathy:      Upper Body:      Right upper body: No supraclavicular or axillary adenopathy.   Skin:     General: Skin is warm and dry.   Neurological:      Mental Status: She is alert and oriented to person, place, and time.   Psychiatric:         Mood and Affect: Mood normal.        Result Review :      CMP          7/6/2023    14:43 1/6/2024    17:52 1/9/2024    14:36   CMP   Glucose 98  107  95    BUN 17  19  21    Creatinine 0.95  1.70  0.78    EGFR 68.7  34.0  86.5    Sodium 138  141  141    Potassium 3.9  4.0  4.4    Chloride 101  106  107    Calcium 9.6  9.1  9.7    Total Protein 7.4  7.5     Albumin 4.7  4.5     Globulin 2.7  3.0     Total Bilirubin 0.6  0.4     Alkaline Phosphatase 77  96     AST (SGOT) 29  21     ALT (SGPT) 26  18     Albumin/Globulin Ratio 1.7  1.5     BUN/Creatinine Ratio 17.9  11.2  26.9    Anion Gap 12.2  13.3  10.2          PROCEDURE:  CT ABDOMEN PELVIS WO CONTRAST     COMPARISON: None.     INDICATIONS:  GENERALIZED ABDOMINAL PAIN. POSITIVE COVID-19 INFECTION.     TECHNIQUE:    750 CT images were created without intravenous or oral contrast agent administration.       PROTOCOL:     Standard CT imaging protocol performed.                 RADIATION:      Total DLP: 617.5 mGy*cm.               Automated exposure control was utilized to minimize radiation dose.      FINDINGS:          LIVER:  Normal.  No enlargement, atrophy, abnormal density, or significant focal lesion.    BILIARY:            The patient has undergone cholecystectomy.  PANCREAS:      Normal.  No lesion, fluid collection, ductal dilatation, or atrophy.  No acute   pancreatitis is suggested.  SPLEEN:           Normal.  No enlargement or focal lesion.    KIDNEYS:          Normal.  No mass, obstruction, or calcification.  No  hydronephrosis is suspected.  ADRENALS:      Normal.  No mass or enlargement.    AORTA/VASCULAR:      Normal.  No aneurysm.  Atherosclerotic changes involve the aortoiliac arterial   system without aneurysmal dilatation.  RETROPERITONEUM:  Normal.  No mass or adenopathy.    BOWEL/MESENTERY:  Normal.  No visible mass, obstruction, or bowel wall thickening.  Nonobstructing   appendicoliths are identified.  No acute appendicitis.  There are scattered colonic diverticula   without acute colonic diverticulitis.  No pneumoperitoneum or pneumatosis.  ABDOMINAL WALL:      A tiny fat-containing umbilical hernia is seen.  It does not contain bowel.  URINARY BLADDER:    Normal.  No visible focal wall thickening, lesion, or calculus.    PELVIC NODES:            Normal.  No adenopathy.    PELVIC ORGANS:         Normal.  No visible mass.  Pelvic organs appropriate for patient age.    BONES:             No bony lesion or acute fracture.  Bilateral facet osteoarthritis is seen at L5-S1 with mild   chronic anterolisthesis.  LUNG BASES:  Normal.  No visible pulmonary or pleural disease.    OTHER:             There are pelvic phleboliths.     IMPRESSION:               No acute findings are seen by nonenhanced CT examination of the abdomen and pelvis.             Please note that portions of this note were completed with a voice recognition program.     JULIETA SOL JR, MD         Electronically Signed and Approved By: JULIETA SOL JR, MD on 1/06/2024 at 22:34           Assessment and Plan     Diagnoses and all orders for this visit:    1. Large mass of right breast (Primary)  -     Mammo Diagnostic Digital Tomosynthesis Bilateral With CAD; Future  -     US Breast Right Complete; Future    2. RUQ abdominal pain  -     Comprehensive metabolic panel; Future  -     Amylase; Future  -     Lipase; Future    3. Unspecified lump in the right breast, overlapping quadrants  -     Mammo Diagnostic Digital Tomosynthesis Bilateral With CAD;  Future  -     US Breast Right Complete; Future    Has upcoming appointment with surgeon, will try to obtain repeat mammogram and US before this appointment.  Will get updated labs to assess for elevated liver enzymes, or pancreatic enzymes.  Recommend bland diet until she sees the surgeon.  Emergency room for worsening.         Follow Up     Return if symptoms worsen or fail to improve.  Patient was given instructions and counseling regarding her condition or for health maintenance advice. Please see specific information pulled into the AVS if appropriate.

## 2024-02-27 ENCOUNTER — TELEPHONE (OUTPATIENT)
Dept: FAMILY MEDICINE CLINIC | Age: 64
End: 2024-02-27
Payer: MEDICARE

## 2024-02-27 DIAGNOSIS — N63.10 LARGE MASS OF RIGHT BREAST: ICD-10-CM

## 2024-02-27 DIAGNOSIS — R10.84 GENERALIZED ABDOMINAL PAIN: Primary | ICD-10-CM

## 2024-02-27 NOTE — TELEPHONE ENCOUNTER
Both orders were faxed to [facility] Scheduling on 02/26. Calling today to ensure that both tests get scheduled.

## 2024-02-27 NOTE — TELEPHONE ENCOUNTER
"If there is something I need to do here, please let me know.  I do not see any orders I need to place.  I do not recall looking at this CT abdomen until just now.  The finding of a \"tiny fat-containing umbilical hernia\" is probably not significant, but I would be happy to see her back and review her care if she would like.  Thanks  "

## 2024-02-27 NOTE — TELEPHONE ENCOUNTER
Patient is scheduled for Diagnostic Mammogram and Ultrasound @ [facility] on 03-04 @ 2:00 pm.     Patient inf of apt date and time.     Spoke with pt- she states that she is in a lot of pain, both breast and abdominal pain. She states that she had a CT Abd/Pel back in January when she went to the ER. She sees where it states she has an umbilical hernia', but that no one ever called to discuss those results with her.     Routing message to [employer] to contact patient regarding further treatment plan.

## 2024-02-27 NOTE — TELEPHONE ENCOUNTER
Caller: Chely Calderón    Relationship: Self    Best call back number: [phone]    What orders are you requesting (i.e. lab or imaging): IMAGING      Where will you receive your lab/imaging services: [facility]    Additional notes: ORDERS FOR THE ULTRASOUND WERE TRANSFERRED TO [facility] AND IT IS SCHEDULED FOR 03/01/2024, BUT THEY DID NOT GET THE ORDERS FOR THE MAMMOGRAM PATIENT NEEDS TO KNOW IF SHE NEEDS TO GET BOTH AND IF THE MAMMOGRAM ORDER CAN BE SENT SO SHE CAN GET THEM ALL ON THE SAME DAY    PLEASE CONTACT AND ADVISE

## 2024-03-04 DIAGNOSIS — N63.15 UNSPECIFIED LUMP IN THE RIGHT BREAST, OVERLAPPING QUADRANTS: ICD-10-CM

## 2024-03-04 DIAGNOSIS — N63.10 LARGE MASS OF RIGHT BREAST: ICD-10-CM

## 2024-03-05 DIAGNOSIS — N63.10 LARGE MASS OF RIGHT BREAST: ICD-10-CM

## 2024-03-05 DIAGNOSIS — N63.15 UNSPECIFIED LUMP IN THE RIGHT BREAST, OVERLAPPING QUADRANTS: ICD-10-CM

## 2024-07-09 ENCOUNTER — TELEPHONE (OUTPATIENT)
Dept: FAMILY MEDICINE CLINIC | Age: 64
End: 2024-07-09
Payer: MEDICARE

## 2024-07-09 DIAGNOSIS — I10 ESSENTIAL HYPERTENSION: ICD-10-CM

## 2024-07-09 RX ORDER — AMLODIPINE BESYLATE 10 MG/1
10 TABLET ORAL DAILY
Qty: 90 TABLET | Refills: 1 | Status: SHIPPED | OUTPATIENT
Start: 2024-07-09

## 2024-07-09 RX ORDER — CARVEDILOL 6.25 MG/1
6.25 TABLET ORAL 2 TIMES DAILY
Qty: 14 TABLET | Refills: 0 | Status: SHIPPED | OUTPATIENT
Start: 2024-07-09

## 2024-07-09 NOTE — TELEPHONE ENCOUNTER
Caller: Chely Calderón    Relationship to patient: Self    Best call back number: [phone]    Patient is needing: PATIENT'S PRESCRIPTION FOR   carvedilol (COREG) 6.25 MG tablet Take 1 tablet by mouth 2 (Two) Times a Day WAS LOST IN THE MAIL AND PATIENT IS REQUESTING A ONE WEEK REFILL BE SENT TO   [facility] [place], [place] - [address] [facility] - [phone] [facility] [phone]  [phone]     PATIENT HAS BEEN OUT FOR A WEEK AND SAID MAIL ORDER WILL BE DELIVERING WITHIN A WEEK. PATIENT SAID BLOOD PRESSURE HAS BEEN HIGH WITHOUT HER MEDICATION

## 2024-07-30 ENCOUNTER — OFFICE VISIT (OUTPATIENT)
Dept: CARDIOLOGY | Facility: CLINIC | Age: 64
End: 2024-07-30
Payer: MEDICARE

## 2024-07-30 VITALS
WEIGHT: 191 LBS | SYSTOLIC BLOOD PRESSURE: 134 MMHG | HEART RATE: 68 BPM | BODY MASS INDEX: 32.61 KG/M2 | DIASTOLIC BLOOD PRESSURE: 74 MMHG | HEIGHT: 64 IN

## 2024-07-30 DIAGNOSIS — I10 ESSENTIAL HYPERTENSION: ICD-10-CM

## 2024-07-30 DIAGNOSIS — R00.2 PALPITATIONS: Primary | ICD-10-CM

## 2024-07-30 DIAGNOSIS — E78.2 MIXED HYPERLIPIDEMIA: ICD-10-CM

## 2024-07-30 PROCEDURE — 3078F DIAST BP <80 MM HG: CPT | Performed by: FAMILY MEDICINE

## 2024-07-30 PROCEDURE — 1159F MED LIST DOCD IN RCRD: CPT | Performed by: FAMILY MEDICINE

## 2024-07-30 PROCEDURE — 1160F RVW MEDS BY RX/DR IN RCRD: CPT | Performed by: FAMILY MEDICINE

## 2024-07-30 PROCEDURE — 99214 OFFICE O/P EST MOD 30 MIN: CPT | Performed by: FAMILY MEDICINE

## 2024-07-30 PROCEDURE — 3075F SYST BP GE 130 - 139MM HG: CPT | Performed by: FAMILY MEDICINE

## 2024-07-30 RX ORDER — MONTELUKAST SODIUM 4 MG/1
1 TABLET, CHEWABLE ORAL 2 TIMES DAILY
COMMUNITY

## 2024-07-30 NOTE — ASSESSMENT & PLAN NOTE
Uncertain lipid control.  Will recheck fasting lipid profile.  In the meantime continue current dose atorvastatin.

## 2024-07-30 NOTE — ASSESSMENT & PLAN NOTE
Will check 48-hour Holter monitor to evaluate symptoms of palpitations.  See if she has significant ectopy or arrhythmia.  Recommend staying well-hydrated, limit caffeine intake.  Will also check her electrolytes and thyroid function.

## 2024-07-30 NOTE — ASSESSMENT & PLAN NOTE
Blood pressure is well-controlled.  Continue current doses of losartan, carvedilol, and hydrochlorothiazide.  Previous labs show normal joint renal function.

## 2024-08-02 DIAGNOSIS — I10 ESSENTIAL HYPERTENSION: ICD-10-CM

## 2024-08-02 RX ORDER — CARVEDILOL 6.25 MG/1
6.25 TABLET ORAL 2 TIMES DAILY
Qty: 14 TABLET | Refills: 0 | Status: SHIPPED | OUTPATIENT
Start: 2024-08-02

## 2024-08-02 NOTE — TELEPHONE ENCOUNTER
Caller: Chely Calderón    Relationship: Self    Best call back number: [phone]     Requested Prescriptions:   Requested Prescriptions     Pending Prescriptions Disp Refills    carvedilol (COREG) 6.25 MG tablet 14 tablet 0     Sig: Take 1 tablet by mouth 2 (Two) Times a Day.        Pharmacy where request should be sent: [facility] [id number] [place] - [address] [facility] - [phone]  - [phone] FX     Last office visit with prescribing clinician: 9/25/2023   Last telemedicine visit with prescribing clinician: Visit date not found   Next office visit with prescribing clinician: 9/26/2024     Additional details provided by patient: CALLER STATED THAT SHE HAS ONE DOSE REMAINING AND HAS NOT RECEIVED REFILL FROM MAIL ORDER PHARMACY     Does the patient have less than a 3 day supply:  [x] Yes  [] No    Dyan Mccartney Rep   08/02/24 15:09 EDT

## 2024-08-05 ENCOUNTER — LAB (OUTPATIENT)
Dept: LAB | Facility: HOSPITAL | Age: 64
End: 2024-08-05
Payer: MEDICARE

## 2024-08-05 DIAGNOSIS — E78.2 MIXED HYPERLIPIDEMIA: ICD-10-CM

## 2024-08-05 DIAGNOSIS — I10 ESSENTIAL HYPERTENSION: ICD-10-CM

## 2024-08-05 DIAGNOSIS — R00.2 PALPITATIONS: ICD-10-CM

## 2024-08-05 DIAGNOSIS — R73.9 HYPERGLYCEMIA: Primary | ICD-10-CM

## 2024-08-05 LAB
ALBUMIN SERPL-MCNC: 4 G/DL (ref 3.5–5.2)
ALBUMIN/GLOB SERPL: 1.4 G/DL
ALP SERPL-CCNC: 82 U/L (ref 39–117)
ALT SERPL W P-5'-P-CCNC: 17 U/L (ref 1–33)
ANION GAP SERPL CALCULATED.3IONS-SCNC: 15 MMOL/L (ref 5–15)
AST SERPL-CCNC: 21 U/L (ref 1–32)
BILIRUB SERPL-MCNC: 0.5 MG/DL (ref 0–1.2)
BUN SERPL-MCNC: 18 MG/DL (ref 8–23)
BUN/CREAT SERPL: 16.4 (ref 7–25)
CALCIUM SPEC-SCNC: 9.1 MG/DL (ref 8.6–10.5)
CHLORIDE SERPL-SCNC: 104 MMOL/L (ref 98–107)
CHOLEST SERPL-MCNC: 139 MG/DL (ref 0–200)
CO2 SERPL-SCNC: 20 MMOL/L (ref 22–29)
CREAT SERPL-MCNC: 1.1 MG/DL (ref 0.57–1)
DEPRECATED RDW RBC AUTO: 48.5 FL (ref 37–54)
EGFRCR SERPLBLD CKD-EPI 2021: 57.3 ML/MIN/1.73
ERYTHROCYTE [DISTWIDTH] IN BLOOD BY AUTOMATED COUNT: 14.5 % (ref 12.3–15.4)
GLOBULIN UR ELPH-MCNC: 2.8 GM/DL
GLUCOSE SERPL-MCNC: 114 MG/DL (ref 65–99)
HCT VFR BLD AUTO: 38.9 % (ref 34–46.6)
HDLC SERPL-MCNC: 60 MG/DL (ref 40–60)
HGB BLD-MCNC: 12.2 G/DL (ref 12–15.9)
LDLC SERPL CALC-MCNC: 65 MG/DL (ref 0–100)
LDLC/HDLC SERPL: 1.09 {RATIO}
MCH RBC QN AUTO: 28.3 PG (ref 26.6–33)
MCHC RBC AUTO-ENTMCNC: 31.4 G/DL (ref 31.5–35.7)
MCV RBC AUTO: 90.3 FL (ref 79–97)
PLATELET # BLD AUTO: 347 10*3/MM3 (ref 140–450)
PMV BLD AUTO: 9 FL (ref 6–12)
POTASSIUM SERPL-SCNC: 4.2 MMOL/L (ref 3.5–5.2)
PROT SERPL-MCNC: 6.8 G/DL (ref 6–8.5)
RBC # BLD AUTO: 4.31 10*6/MM3 (ref 3.77–5.28)
SODIUM SERPL-SCNC: 139 MMOL/L (ref 136–145)
TRIGL SERPL-MCNC: 68 MG/DL (ref 0–150)
TSH SERPL DL<=0.05 MIU/L-ACNC: 2.49 UIU/ML (ref 0.27–4.2)
VLDLC SERPL-MCNC: 14 MG/DL (ref 5–40)
WBC NRBC COR # BLD AUTO: 5.32 10*3/MM3 (ref 3.4–10.8)

## 2024-08-05 PROCEDURE — 80053 COMPREHEN METABOLIC PANEL: CPT

## 2024-08-05 PROCEDURE — 80061 LIPID PANEL: CPT

## 2024-08-05 PROCEDURE — 36415 COLL VENOUS BLD VENIPUNCTURE: CPT

## 2024-08-05 PROCEDURE — 85027 COMPLETE CBC AUTOMATED: CPT

## 2024-08-05 PROCEDURE — 84443 ASSAY THYROID STIM HORMONE: CPT

## 2024-08-08 ENCOUNTER — TELEPHONE (OUTPATIENT)
Dept: FAMILY MEDICINE CLINIC | Age: 64
End: 2024-08-08
Payer: MEDICARE

## 2024-08-08 NOTE — TELEPHONE ENCOUNTER
Caller: Chely Calderón    Relationship: Self    Best call back number: [phone]    What is the best time to reach you: ANYTIME    Who are you requesting to speak with (clinical staff, provider,  specific staff member): MARCOS    What was the call regarding: PATIENT IS CONCERNED ABOUT HER GLUCOSE LEVELS.  SHE HAS CONTINUES HEADACHE, EYE SIGHT ISSUE  AND HEADACHES AND WANTED TO SEE IF DR. GUARDADO COULD WORK HER IN FOR A VISIT OR DO A VIDEO VISTE TO DISCUSS A PLAN OF TREATMENT.

## 2024-08-08 NOTE — TELEPHONE ENCOUNTER
I would be willing to work her in at 11:15 AM or 3:00 PM tomorrow.  There is likely to be a wait.  Thanks.

## 2024-08-09 ENCOUNTER — OFFICE VISIT (OUTPATIENT)
Dept: FAMILY MEDICINE CLINIC | Age: 64
End: 2024-08-09
Payer: MEDICARE

## 2024-08-09 VITALS
OXYGEN SATURATION: 100 % | SYSTOLIC BLOOD PRESSURE: 148 MMHG | BODY MASS INDEX: 32.33 KG/M2 | HEART RATE: 64 BPM | DIASTOLIC BLOOD PRESSURE: 63 MMHG | HEIGHT: 64 IN | TEMPERATURE: 97.5 F | WEIGHT: 189.4 LBS

## 2024-08-09 DIAGNOSIS — K21.9 GERD WITHOUT ESOPHAGITIS: ICD-10-CM

## 2024-08-09 DIAGNOSIS — R51.9 NONINTRACTABLE HEADACHE, UNSPECIFIED CHRONICITY PATTERN, UNSPECIFIED HEADACHE TYPE: ICD-10-CM

## 2024-08-09 DIAGNOSIS — E78.2 MIXED HYPERLIPIDEMIA: ICD-10-CM

## 2024-08-09 DIAGNOSIS — I10 ESSENTIAL HYPERTENSION: ICD-10-CM

## 2024-08-09 DIAGNOSIS — R73.9 HYPERGLYCEMIA: Primary | ICD-10-CM

## 2024-08-09 DIAGNOSIS — R07.9 CHEST PAIN, UNSPECIFIED TYPE: ICD-10-CM

## 2024-08-09 LAB
EXPIRATION DATE: NORMAL
HBA1C MFR BLD: 5.6 % (ref 4.5–5.7)
Lab: NORMAL

## 2024-08-09 PROCEDURE — 3044F HG A1C LEVEL LT 7.0%: CPT | Performed by: FAMILY MEDICINE

## 2024-08-09 PROCEDURE — 3078F DIAST BP <80 MM HG: CPT | Performed by: FAMILY MEDICINE

## 2024-08-09 PROCEDURE — 1125F AMNT PAIN NOTED PAIN PRSNT: CPT | Performed by: FAMILY MEDICINE

## 2024-08-09 PROCEDURE — 93000 ELECTROCARDIOGRAM COMPLETE: CPT | Performed by: FAMILY MEDICINE

## 2024-08-09 PROCEDURE — 3077F SYST BP >= 140 MM HG: CPT | Performed by: FAMILY MEDICINE

## 2024-08-09 PROCEDURE — 1159F MED LIST DOCD IN RCRD: CPT | Performed by: FAMILY MEDICINE

## 2024-08-09 PROCEDURE — 99214 OFFICE O/P EST MOD 30 MIN: CPT | Performed by: FAMILY MEDICINE

## 2024-08-09 PROCEDURE — 83036 HEMOGLOBIN GLYCOSYLATED A1C: CPT | Performed by: FAMILY MEDICINE

## 2024-08-09 PROCEDURE — 1160F RVW MEDS BY RX/DR IN RCRD: CPT | Performed by: FAMILY MEDICINE

## 2024-08-09 RX ORDER — CYCLOBENZAPRINE HCL 10 MG
10 TABLET ORAL 2 TIMES DAILY PRN
Qty: 30 TABLET | Refills: 1 | Status: SHIPPED | OUTPATIENT
Start: 2024-08-09

## 2024-08-09 NOTE — PROGRESS NOTES
Chely Calderón presents to [facility].    Chief Complaint:  Hyperglycemia, blood pressure, headaches    Subjective   History of Present Illness:  Chely is being seen today for follow-up on her care.  She is concerned about how she has been feeling recently.  She was noted on recent testing to have a mild elevation of her glucose and would like to rule out diabetes.  She had an A1c that was fairly normal on most recent check, but that was about 2 years ago.    She has noted an increased frequency of headaches as well as fatigue.  She states that she is having a headache on a daily basis the headache is primarily in the temple and frontal region.  She does have some pain on the top of the head as well.  This is typically a dull headache with a few seconds intermittently where it will feel more intense.  She denies any obvious numbness or weakness.  She denies any change with speech or swallowing.  She has noted some vision change, but she recently saw the eye doctor regarding this.  She takes Tylenol as needed for headache currently.    She is concerned about ongoing issues with chest pain.  She has at least in part attributed this to reflux.  She did have a recent EGD that confirmed gastritis.  She has not had recent stress test.    Review of Systems:  Review of Systems   Constitutional:  Negative for chills and fever.   Respiratory:  Negative for cough and shortness of breath.    Cardiovascular:  Positive for chest pain and palpitations.   Gastrointestinal:  Negative for abdominal pain, nausea and vomiting.      Objective   Medical History:  Past Medical History:    Arthritis    Dysuria    Essential (primary) hypertension    Family history of malignant neoplasm of breast    Fever    Fibromyalgia    Fibromyalgia, primary    Foot pain, right    GERD without esophagitis    Heart murmur    Low back pain    Mixed hyperlipidemia    Other fatigue    Other specified anxiety disorders     Spontaneous ecchymoses    Unspecified abdominal pain    Unspecified lump in the right breast, unspecified quadrant     Past Surgical History:    CARDIAC CATHETERIZATION    CHOLECYSTECTOMY    COLONOSCOPY    ENDOMETRIAL ABLATION    TUBAL ABDOMINAL LIGATION      Family History   Problem Relation Age of Onset    Hyperlipidemia Mother     Diabetes type II Mother     Arthritis Mother     Heart disease Mother     Breast cancer Sister     Arthritis Sister     Cancer Sister     Heart disease Sister     Arthritis Father      Social History     Tobacco Use    Smoking status: Former     Current packs/day: 0.00     Average packs/day: 0.3 packs/day for 21.2 years (5.3 ttl pk-yrs)     Types: Cigarettes     Start date: 1992     Quit date: 2013     Years since quittin.6    Smokeless tobacco: Never    Tobacco comments:     off and on from her 30s    Substance Use Topics    Alcohol use: Yes     Comment: 4-5 times a week       Health Maintenance Due   Topic Date Due    TDAP/TD VACCINES (1 - Tdap) Never done    PAP SMEAR  Never done    INFLUENZA VACCINE  2024    ANNUAL WELLNESS VISIT  2024        Immunization History   Administered Date(s) Administered    ABRYSVO (RSV, 60+ or pregnant women 32-36 wks) 2024    COVID-19 (MODERNA) 1st,2nd,3rd Dose Monovalent 2021, 2021, 2021    COVID-19 (PFIZER) BIVALENT 12+YRS 2022    COVID-19 F23 (PFIZER) 12YRS+ (COMIRNATY) 10/26/2023    Covid-19 (Pfizer) Gray Cap Monovalent 2022    Flu Vaccine Intradermal Quad 18-64YR 10/16/2012, 2016, 2017    Flublok 18+yrs 10/15/2021, 10/26/2023    Fluzone (or Fluarix & Flulaval for VFC) >6mos 2022    Influenza Quad Vaccine (Inpatient) 2013    Influenza Seasonal Injectable 10/16/2012, 2016, 2017    Influenza, Unspecified 10/22/2020    Shingrix 2024, 2024       Allergies   Allergen Reactions    Lisinopril Cough        Medications:  Current Outpatient Medications  "on File Prior to Visit   Medication Sig    amLODIPine (NORVASC) 10 MG tablet TAKE 1 TABLET EVERY DAY    atorvastatin (LIPITOR) 20 MG tablet Take 1 tablet by mouth Daily.    carvedilol (COREG) 6.25 MG tablet Take 1 tablet by mouth 2 (Two) Times a Day.    colestipol (COLESTID) 1 g tablet Take 1 tablet by mouth 2 (Two) Times a Day.    losartan (COZAAR) 100 MG tablet Take 1 tablet by mouth Daily.    omeprazole (priLOSEC) 40 MG capsule Take 1 capsule by mouth Daily.     No current facility-administered medications on file prior to visit.       Vital Signs:   /63 (BP Location: Left arm, Patient Position: Sitting)   Pulse 64   Temp 97.5 °F (36.4 °C) (Oral)   Ht 162.6 cm (64.02\")   Wt 85.9 kg (189 lb 6.4 oz)   SpO2 100%   BMI 32.49 kg/m²       Physical Exam:  Physical Exam  Vitals reviewed.   Constitutional:       General: She is not in acute distress.     Appearance: She is not ill-appearing.   Eyes:      Pupils: Pupils are equal, round, and reactive to light.   Neck:      Comments: No thyromegaly  Cardiovascular:      Rate and Rhythm: Normal rate and regular rhythm.   Pulmonary:      Effort: Pulmonary effort is normal.      Breath sounds: Normal breath sounds.   Abdominal:      General: There is no distension.      Palpations: Abdomen is soft.      Tenderness: There is no abdominal tenderness.   Musculoskeletal:      Cervical back: Neck supple.   Lymphadenopathy:      Cervical: No cervical adenopathy.   Skin:     Findings: No lesion or rash.   Neurological:      General: No focal deficit present.      Mental Status: She is alert.      Cranial Nerves: No cranial nerve deficit.      Motor: No weakness.     Result Review   The following data was reviewed by Manoj Ybarra MD on 08/09/2024.  Lab Results   Component Value Date    WBC 5.32 08/05/2024    HGB 12.2 08/05/2024    HCT 38.9 08/05/2024    MCV 90.3 08/05/2024     08/05/2024     Lab Results   Component Value Date    GLUCOSE 114 (H) 08/05/2024 "    BUN 18 08/05/2024    CREATININE 1.10 (H) 08/05/2024     08/05/2024    K 4.2 08/05/2024     08/05/2024    CO2 20.0 (L) 08/05/2024    CALCIUM 9.1 08/05/2024    PROTEINTOT 6.8 08/05/2024    ALBUMIN 4.0 08/05/2024    ALT 17 08/05/2024    AST 21 08/05/2024    ALKPHOS 82 08/05/2024    BILITOT 0.5 08/05/2024    EGFR 57.3 (L) 08/05/2024    GLOB 2.8 08/05/2024    AGRATIO 1.4 08/05/2024    BCR 16.4 08/05/2024    ANIONGAP 15.0 08/05/2024      Lab Results   Component Value Date    CHOL 139 08/05/2024    CHLPL 175 03/22/2021    TRIG 68 08/05/2024    HDL 60 08/05/2024    LDL 65 08/05/2024     Lab Results   Component Value Date    TSH 2.490 08/05/2024     Lab Results   Component Value Date    HGBA1C 5.6 08/09/2024     ECG 12 Lead    Date/Time: 8/9/2024 3:45 PM  Performed by: Manoj Ybarra MD    Authorized by: Manoj Ybarra MD  Comparison: compared with previous ECG from 1/6/2024  Similar to previous ECG  Rhythm: sinus rhythm  Rate: normal  BPM: 70  Conduction: conduction normal  ST Segments: ST segments normal  T Waves: T waves normal  QRS axis: normal  Other: no other findings    Clinical impression: normal ECG  Clinical impression comment: This is a normal EKG without obvious change compared to most recent tracing.         Assessment and Plan:   Today, we have reviewed her care.  This includes recent labs that were ordered by cardiology.  There was no finding that was of obvious concern in the short-term.  She is concerned about the elevated sugar, and we will check an A1c level today.  Regarding the headache, there is not any worrisome finding on physical exam.  This could be a tension type headache.  We will give her a trial of generic Flexeril for as needed use especially at night to see if that is of benefit.  Risk of sedation is discussed.  EKG will also be obtained today.  Consider increasing the dose of carvedilol based on her recent heart monitor testing.  We will also recheck her blood  pressure.  I may move ahead with a stress test.    Diagnoses and all orders for this visit:    1. Hyperglycemia (Primary)  -     POC Glycosylated Hemoglobin (Hb A1C)    2. Nonintractable headache, unspecified chronicity pattern, unspecified headache type  -     cyclobenzaprine (FLEXERIL) 10 MG tablet; Take 1 tablet by mouth 2 (Two) Times a Day As Needed for Muscle Spasms.  Dispense: 30 tablet; Refill: 1    3. Chest pain, unspecified type  -     ECG 12 Lead    4. Essential hypertension  -     ECG 12 Lead    Follow Up  Return in about 7 weeks (around 9/26/2024) for Recheck, Next scheduled follow up.  Patient was given instructions and counseling regarding her condition or for health maintenance advice. Please see specific information pulled into the AVS if appropriate.

## 2024-08-09 NOTE — Clinical Note
Good afternoon, Netta Phan.  Chely was in today with concerns about her blood pressure and ongoing headaches.  I did review her Holter monitor, and she has frequent PACs and some runs.  What are your thoughts on me increasing her carvedilol to 12.5 mg twice daily?  Thanks.

## 2024-08-10 RX ORDER — LOSARTAN POTASSIUM 100 MG/1
100 TABLET ORAL DAILY
Qty: 90 TABLET | Refills: 3 | Status: SHIPPED | OUTPATIENT
Start: 2024-08-10

## 2024-08-10 RX ORDER — AMLODIPINE BESYLATE 10 MG/1
10 TABLET ORAL DAILY
Qty: 90 TABLET | Refills: 3 | Status: SHIPPED | OUTPATIENT
Start: 2024-08-10

## 2024-08-10 RX ORDER — ATORVASTATIN CALCIUM 20 MG/1
20 TABLET, FILM COATED ORAL DAILY
Qty: 90 TABLET | Refills: 3 | Status: SHIPPED | OUTPATIENT
Start: 2024-08-10

## 2024-08-10 RX ORDER — CARVEDILOL 12.5 MG/1
12.5 TABLET ORAL 2 TIMES DAILY
Qty: 180 TABLET | Refills: 3 | Status: SHIPPED | OUTPATIENT
Start: 2024-08-10 | End: 2024-08-12 | Stop reason: SDUPTHER

## 2024-08-10 RX ORDER — OMEPRAZOLE 40 MG/1
40 CAPSULE, DELAYED RELEASE ORAL DAILY
Qty: 90 CAPSULE | Refills: 3 | Status: SHIPPED | OUTPATIENT
Start: 2024-08-10

## 2024-08-11 DIAGNOSIS — R00.2 PALPITATIONS: Primary | ICD-10-CM

## 2024-08-12 ENCOUNTER — TELEPHONE (OUTPATIENT)
Dept: CARDIOLOGY | Facility: CLINIC | Age: 64
End: 2024-08-12
Payer: MEDICARE

## 2024-08-12 RX ORDER — CARVEDILOL 12.5 MG/1
12.5 TABLET ORAL 2 TIMES DAILY
Qty: 14 TABLET | Refills: 0 | Status: SHIPPED | OUTPATIENT
Start: 2024-08-12

## 2024-08-12 NOTE — TELEPHONE ENCOUNTER
----- Message from Netta Burlington sent at 8/11/2024 10:15 PM EDT -----  Holter monitor showed her underlying rhythm is sinus, however she is having frequent early beats originating from the upper portion of the heart.  Approximately 6% of her heartbeats were PACs.  She should continue the increased dose of carvedilol the Dr. Ybarra gave her last week.  I also want to set her up for an echocardiogram for further evaluation.  I will place the order.

## 2024-08-13 NOTE — TELEPHONE ENCOUNTER
ANH patient. Went over results and recommendations. Patient is agreeable to ECHO. Patient states PCP mentioned possible Stress test, will wait for ECHO results prior to ordering Stress test. Patient verbalized understanding and appreciation.

## 2024-08-15 DIAGNOSIS — I10 ESSENTIAL HYPERTENSION: ICD-10-CM

## 2024-08-15 RX ORDER — CARVEDILOL 12.5 MG/1
12.5 TABLET ORAL 2 TIMES DAILY
Qty: 14 TABLET | Refills: 0 | OUTPATIENT
Start: 2024-08-15

## 2024-08-28 ENCOUNTER — HOSPITAL ENCOUNTER (OUTPATIENT)
Dept: CARDIOLOGY | Facility: HOSPITAL | Age: 64
Discharge: HOME | End: 2024-08-28
Admitting: FAMILY MEDICINE
Payer: MEDICARE

## 2024-08-28 DIAGNOSIS — R00.2 PALPITATIONS: ICD-10-CM

## 2024-08-28 PROCEDURE — 93306 TTE W/DOPPLER COMPLETE: CPT

## 2024-09-02 LAB
ASCENDING AORTA: 2.3 CM
BH CV ECHO MEAS - AO MAX PG: 12.4 MMHG
BH CV ECHO MEAS - AO MEAN PG: 5.8 MMHG
BH CV ECHO MEAS - AO ROOT DIAM: 1.9 CM
BH CV ECHO MEAS - AO V2 MAX: 175.8 CM/SEC
BH CV ECHO MEAS - AO V2 VTI: 34.6 CM
BH CV ECHO MEAS - EDV(MOD-SP2): 45.3 ML
BH CV ECHO MEAS - EDV(MOD-SP4): 66.7 ML
BH CV ECHO MEAS - EF(MOD-SP2): 49 %
BH CV ECHO MEAS - EF(MOD-SP4): 60.3 %
BH CV ECHO MEAS - ESV(MOD-SP2): 23.1 ML
BH CV ECHO MEAS - ESV(MOD-SP4): 26.5 ML
BH CV ECHO MEAS - IVS/LVPW: 1.4 CM
BH CV ECHO MEAS - IVSD: 1.05 CM
BH CV ECHO MEAS - LA DIMENSION: 3.6 CM
BH CV ECHO MEAS - LAT PEAK E' VEL: 11.6 CM/SEC
BH CV ECHO MEAS - LV DIASTOLIC VOL/BSA (35-75): 34.9 CM2
BH CV ECHO MEAS - LV MAX PG: 4 MMHG
BH CV ECHO MEAS - LV MEAN PG: 2.2 MMHG
BH CV ECHO MEAS - LV SYSTOLIC VOL/BSA (12-30): 13.9 CM2
BH CV ECHO MEAS - LV V1 MAX: 100 CM/SEC
BH CV ECHO MEAS - LV V1 VTI: 22.4 CM
BH CV ECHO MEAS - LVIDD: 4.9 CM
BH CV ECHO MEAS - LVIDS: 3.7 CM
BH CV ECHO MEAS - LVOT DIAM: 2 CM
BH CV ECHO MEAS - LVPWD: 0.75 CM
BH CV ECHO MEAS - MED PEAK E' VEL: 12.1 CM/SEC
BH CV ECHO MEAS - MV A MAX VEL: 84.5 CM/SEC
BH CV ECHO MEAS - MV E MAX VEL: 86.2 CM/SEC
BH CV ECHO MEAS - MV E/A: 1.02
BH CV ECHO MEAS - RAP SYSTOLE: 5 MMHG
BH CV ECHO MEAS - RVDD: 2.7 CM
BH CV ECHO MEAS - RVSP: 28 MMHG
BH CV ECHO MEAS - SV(MOD-SP2): 22.2 ML
BH CV ECHO MEAS - SV(MOD-SP4): 40.2 ML
BH CV ECHO MEAS - SVI(MOD-SP2): 11.6 ML/M2
BH CV ECHO MEAS - SVI(MOD-SP4): 21 ML/M2
BH CV ECHO MEAS - TAPSE (>1.6): 1.86 CM
BH CV ECHO MEAS - TR MAX PG: 22.5 MMHG
BH CV ECHO MEAS - TR MAX VEL: 237.2 CM/SEC
BH CV ECHO MEASUREMENTS AVERAGE E/E' RATIO: 7.27

## 2024-09-06 ENCOUNTER — TELEPHONE (OUTPATIENT)
Dept: CARDIOLOGY | Facility: CLINIC | Age: 64
End: 2024-09-06
Payer: MEDICARE

## 2024-09-06 NOTE — TELEPHONE ENCOUNTER
----- Message from Netta Vanegas sent at 9/5/2024  7:38 PM EDT -----  Echocardiogram shows normal cardiac function without any significant valvular issues.  She should continue her current cardiac medications.  She is scheduled for stress test later this month, recommend scheduling follow-up appointment after test to discuss results as well as reevaluate symptoms palpitations after recent dose adjustment to carvedilol.

## 2024-09-24 ENCOUNTER — HOSPITAL ENCOUNTER (OUTPATIENT)
Dept: NUCLEAR MEDICINE | Facility: HOSPITAL | Age: 64
Discharge: HOME | End: 2024-09-24
Payer: MEDICARE

## 2024-09-24 DIAGNOSIS — I10 ESSENTIAL HYPERTENSION: ICD-10-CM

## 2024-09-24 DIAGNOSIS — R07.9 CHEST PAIN, UNSPECIFIED TYPE: ICD-10-CM

## 2024-09-24 DIAGNOSIS — M72.2 PLANTAR FASCIITIS: ICD-10-CM

## 2024-09-24 DIAGNOSIS — E78.2 MIXED HYPERLIPIDEMIA: ICD-10-CM

## 2024-09-24 LAB
BH CV IMMEDIATE POST TECH DATA BLOOD PRESSURE: NORMAL MMHG
BH CV IMMEDIATE POST TECH DATA HEART RATE: 95 BPM
BH CV IMMEDIATE POST TECH DATA OXYGEN SATS: 97 %
BH CV REST NUCLEAR ISOTOPE DOSE: 9.7 MCI
BH CV SIX MINUTE RECOVERY TECH DATA BLOOD PRESSURE: NORMAL
BH CV SIX MINUTE RECOVERY TECH DATA HEART RATE: 92 BPM
BH CV SIX MINUTE RECOVERY TECH DATA OXYGEN SATURATION: 98 %
BH CV STRESS BP STAGE 1: NORMAL
BH CV STRESS COMMENTS STAGE 1: NORMAL
BH CV STRESS DOSE REGADENOSON STAGE 1: 0.4
BH CV STRESS DURATION MIN STAGE 1: 0
BH CV STRESS DURATION SEC STAGE 1: 10
BH CV STRESS HR STAGE 1: 81
BH CV STRESS NUCLEAR ISOTOPE DOSE: 38.2 MCI
BH CV STRESS O2 STAGE 1: 97
BH CV STRESS PROTOCOL 1: NORMAL
BH CV STRESS RECOVERY BP: NORMAL MMHG
BH CV STRESS RECOVERY HR: 92 BPM
BH CV STRESS RECOVERY O2: 98 %
BH CV STRESS STAGE 1: 1
BH CV THREE MINUTE POST TECH DATA BLOOD PRESSURE: NORMAL MMHG
BH CV THREE MINUTE POST TECH DATA HEART RATE: 94 BPM
BH CV THREE MINUTE POST TECH DATA OXYGEN SATURATION: 98 %
LV EF NUC BP: 64 %
MAXIMAL PREDICTED HEART RATE: 158 BPM
PERCENT MAX PREDICTED HR: 60.13 %
STRESS BASELINE BP: NORMAL MMHG
STRESS BASELINE HR: 67 BPM
STRESS O2 SAT REST: 99 %
STRESS PERCENT HR: 71 %
STRESS POST O2 SAT PEAK: 99 %
STRESS POST PEAK BP: NORMAL MMHG
STRESS POST PEAK HR: 95 BPM
STRESS TARGET HR: 134 BPM

## 2024-09-24 PROCEDURE — 25010000002 REGADENOSON 0.4 MG/5ML SOLUTION: Performed by: FAMILY MEDICINE

## 2024-09-24 PROCEDURE — 93016 CV STRESS TEST SUPVJ ONLY: CPT | Performed by: NURSE PRACTITIONER

## 2024-09-24 PROCEDURE — 78452 HT MUSCLE IMAGE SPECT MULT: CPT | Performed by: INTERNAL MEDICINE

## 2024-09-24 PROCEDURE — A9502 TC99M TETROFOSMIN: HCPCS | Performed by: FAMILY MEDICINE

## 2024-09-24 PROCEDURE — 0 TECHNETIUM TETROFOSMIN KIT: Performed by: FAMILY MEDICINE

## 2024-09-24 PROCEDURE — 78452 HT MUSCLE IMAGE SPECT MULT: CPT

## 2024-09-24 PROCEDURE — 93018 CV STRESS TEST I&R ONLY: CPT | Performed by: INTERNAL MEDICINE

## 2024-09-24 PROCEDURE — 93017 CV STRESS TEST TRACING ONLY: CPT

## 2024-09-24 RX ORDER — REGADENOSON 0.08 MG/ML
0.4 INJECTION, SOLUTION INTRAVENOUS
Status: COMPLETED | OUTPATIENT
Start: 2024-09-24 | End: 2024-09-24

## 2024-09-24 RX ADMIN — TETROFOSMIN 1 DOSE: 1.38 INJECTION, POWDER, LYOPHILIZED, FOR SOLUTION INTRAVENOUS at 07:55

## 2024-09-24 RX ADMIN — TETROFOSMIN 1 DOSE: 1.38 INJECTION, POWDER, LYOPHILIZED, FOR SOLUTION INTRAVENOUS at 09:25

## 2024-09-24 RX ADMIN — REGADENOSON 0.4 MG: 0.08 INJECTION, SOLUTION INTRAVENOUS at 09:25

## 2024-10-04 ENCOUNTER — LAB (OUTPATIENT)
Dept: LAB | Facility: HOSPITAL | Age: 64
End: 2024-10-04
Payer: MEDICARE

## 2024-10-04 ENCOUNTER — OFFICE VISIT (OUTPATIENT)
Dept: FAMILY MEDICINE CLINIC | Age: 64
End: 2024-10-04
Payer: MEDICARE

## 2024-10-04 VITALS
DIASTOLIC BLOOD PRESSURE: 61 MMHG | BODY MASS INDEX: 32.3 KG/M2 | HEIGHT: 64 IN | TEMPERATURE: 97.9 F | WEIGHT: 189.2 LBS | SYSTOLIC BLOOD PRESSURE: 120 MMHG | OXYGEN SATURATION: 100 % | HEART RATE: 67 BPM

## 2024-10-04 DIAGNOSIS — I10 ESSENTIAL HYPERTENSION: ICD-10-CM

## 2024-10-04 DIAGNOSIS — Z00.00 PHYSICAL EXAM: Primary | ICD-10-CM

## 2024-10-04 DIAGNOSIS — Z23 ENCOUNTER FOR IMMUNIZATION: ICD-10-CM

## 2024-10-04 DIAGNOSIS — R79.89 ELEVATED SERUM CREATININE: ICD-10-CM

## 2024-10-04 DIAGNOSIS — E78.2 MIXED HYPERLIPIDEMIA: ICD-10-CM

## 2024-10-04 LAB
ANION GAP SERPL CALCULATED.3IONS-SCNC: 9 MMOL/L (ref 5–15)
BACTERIA UR QL AUTO: ABNORMAL /HPF
BILIRUB UR QL STRIP: NEGATIVE
BUN SERPL-MCNC: 16 MG/DL (ref 8–23)
BUN/CREAT SERPL: 17.8 (ref 7–25)
CALCIUM SPEC-SCNC: 9.5 MG/DL (ref 8.6–10.5)
CHLORIDE SERPL-SCNC: 106 MMOL/L (ref 98–107)
CLARITY UR: CLEAR
CO2 SERPL-SCNC: 26 MMOL/L (ref 22–29)
COLOR UR: YELLOW
CREAT SERPL-MCNC: 0.9 MG/DL (ref 0.57–1)
EGFRCR SERPLBLD CKD-EPI 2021: 72.4 ML/MIN/1.73
GLUCOSE SERPL-MCNC: 98 MG/DL (ref 65–99)
GLUCOSE UR STRIP-MCNC: NEGATIVE MG/DL
HGB UR QL STRIP.AUTO: NEGATIVE
KETONES UR QL STRIP: NEGATIVE
LEUKOCYTE ESTERASE UR QL STRIP.AUTO: NEGATIVE
NITRITE UR QL STRIP: NEGATIVE
PH UR STRIP.AUTO: 6.5 [PH] (ref 5–8)
POTASSIUM SERPL-SCNC: 4.4 MMOL/L (ref 3.5–5.2)
PROT UR QL STRIP: NEGATIVE
RBC # UR STRIP: ABNORMAL /HPF
REF LAB TEST METHOD: ABNORMAL
SODIUM SERPL-SCNC: 141 MMOL/L (ref 136–145)
SP GR UR STRIP: 1.01 (ref 1–1.03)
SQUAMOUS #/AREA URNS HPF: ABNORMAL /HPF
UROBILINOGEN UR QL STRIP: NORMAL
WBC # UR STRIP: ABNORMAL /HPF

## 2024-10-04 PROCEDURE — 36415 COLL VENOUS BLD VENIPUNCTURE: CPT

## 2024-10-04 PROCEDURE — 81001 URINALYSIS AUTO W/SCOPE: CPT

## 2024-10-04 PROCEDURE — 80048 BASIC METABOLIC PNL TOTAL CA: CPT

## 2024-10-04 RX ORDER — ASPIRIN 81 MG/1
81 TABLET ORAL DAILY
COMMUNITY

## 2024-10-04 NOTE — PROGRESS NOTES
The ABCs of the Annual Wellness Visit  Subsequent Medicare Wellness Visit    Subjective    Chely Calderón is a 64 y.o. patient who presents for a Subsequent Medicare Wellness Visit.    The following portions of the patient's history were reviewed and updated as appropriate: allergies, current medications, past family history, past medical history, past social history, past surgical history, and problem list.    Compared to one year ago, the patient feels her physical health is better.    Compared to one year ago, the patient feels her mental health is better.    Recent Hospitalizations:  She was not admitted to the hospital during the last year.     Current Medical Providers:  Patient Care Team:  Manoj Ybarra MD as PCP - General (Family Medicine)  Jesse Wilson MD as Consulting Physician (Cardiology)  Angélica Emmanuel APRN as Nurse Practitioner (Obstetrics and Gynecology)    Outpatient Medications Prior to Visit   Medication Sig Dispense Refill    amLODIPine (NORVASC) 10 MG tablet Take 1 tablet by mouth Daily. 90 tablet 3    atorvastatin (LIPITOR) 20 MG tablet Take 1 tablet by mouth Daily. 90 tablet 3    carvedilol (COREG) 12.5 MG tablet Take 1 tablet by mouth 2 (Two) Times a Day. 14 tablet 0    cyclobenzaprine (FLEXERIL) 10 MG tablet Take 1 tablet by mouth 2 (Two) Times a Day As Needed for Muscle Spasms. 30 tablet 1    losartan (COZAAR) 100 MG tablet Take 1 tablet by mouth Daily. 90 tablet 3    omeprazole (priLOSEC) 40 MG capsule Take 1 capsule by mouth Daily. 90 capsule 3    aspirin 81 MG EC tablet Take 1 tablet by mouth Daily.      colestipol (COLESTID) 1 g tablet Take 1 tablet by mouth 2 (Two) Times a Day. (Patient not taking: Reported on 10/4/2024)       No facility-administered medications prior to visit.       No opioid medication identified on active medication list. I have reviewed chart for other potential  high risk medication/s and harmful drug interactions in the  "elderly.      Aspirin is on active medication list.  She tolerates this well and has not had obvious side effects from the medication.    Patient Active Problem List   Diagnosis    Hair loss    Mass of axilla, bilateral    Muscle cramps    Essential hypertension    Hyperlipidemia    Low back pain    Other long term (current) drug therapy    Arthritis    Family history of malignant neoplasm of breast    Fibromyalgia    GERD without esophagitis    Other specified anxiety disorders    Lumbar radiculopathy    Chest pain, atypical    Lower abdominal pain    Chronic depression    Palpitations     Advance Care Planning  Advance Directive is not on file.  ACP discussion was held with the patient during this visit. Patient does not have an advance directive, information provided.  Her son, Nir, would make decisions if needed.     Objective    Vitals:    10/04/24 1105   BP: 120/61   BP Location: Left arm   Patient Position: Sitting   Pulse: 67   Temp: 97.9 °F (36.6 °C)   TempSrc: Oral   SpO2: 100%   Weight: 85.8 kg (189 lb 3.2 oz)   Height: 162.6 cm (64.02\")   PainSc:   6   PainLoc: Foot     Estimated body mass index is 32.46 kg/m² as calculated from the following:    Height as of this encounter: 162.6 cm (64.02\").    Weight as of this encounter: 85.8 kg (189 lb 3.2 oz).    BMI is >= 30 and <35. (Class 1 Obesity). The following options were offered after discussion;: exercise counseling/recommendations and nutrition counseling/recommendations    Does the patient have evidence of cognitive impairment? No     HEALTH RISK ASSESSMENT    Smoking Status:  Social History     Tobacco Use   Smoking Status Former    Current packs/day: 0.00    Average packs/day: 0.3 packs/day for 21.2 years (5.3 ttl pk-yrs)    Types: Cigarettes    Start date: 1992    Quit date: 2013    Years since quittin.7   Smokeless Tobacco Never   Tobacco Comments    off and on from her 30s      Alcohol Consumption:  Social History     Substance and " Sexual Activity   Alcohol Use Yes    Comment: 4-5 times a week     Fall Risk Screen:    MEGAN Fall Risk Assessment was completed, and patient is at LOW risk for falls.Assessment completed on:10/4/2024    Depression Screening:      10/4/2024    11:03 AM   PHQ-2/PHQ-9 Depression Screening   Little Interest or Pleasure in Doing Things 0-->not at all   Feeling Down, Depressed or Hopeless 0-->not at all   PHQ-9: Brief Depression Severity Measure Score 0       Health Habits and Functional and Cognitive Screening:      10/4/2024    11:03 AM   Functional & Cognitive Status   Do you have difficulty preparing food and eating? No   Do you have difficulty bathing yourself, getting dressed or grooming yourself? No   Do you have difficulty using the toilet? No   Do you have difficulty moving around from place to place? No   Do you have trouble with steps or getting out of a bed or a chair? No   Current Diet Well Balanced Diet   Dental Exam Not up to date   Eye Exam Up to date   Exercise (times per week) 3 times per week   Current Exercises Include Other        Exercise Comment stretches   Do you need help using the phone?  No   Are you deaf or do you have serious difficulty hearing?  No   Do you need help to go to places out of walking distance? No   Do you need help shopping? Yes   Do you need help preparing meals?  No   Do you need help with housework?  No   Do you need help with laundry? No   Do you need help taking your medications? No   Do you need help managing money? No   Do you ever drive or ride in a car without wearing a seat belt? No   Have you felt unusual stress, anger or loneliness in the last month? Yes   Who do you live with? Alone   If you need help, do you have trouble finding someone available to you? No   Have you been bothered in the last four weeks by sexual problems? No   Do you have difficulty concentrating, remembering or making decisions? No       Age-appropriate Screening Schedule:  Refer to the list  below for future screening recommendations based on patient's age, sex and/or medical conditions. Orders for these recommended tests are listed in the plan section. The patient has been provided with a written plan.    Health Maintenance   Topic Date Due    TDAP/TD VACCINES (1 - Tdap) Never done    INFLUENZA VACCINE  08/01/2024    LIPID PANEL  08/05/2025    ANNUAL WELLNESS VISIT  10/04/2025    BMI FOLLOWUP  10/04/2025    MAMMOGRAM  03/04/2026    PAP SMEAR  06/13/2026    COLORECTAL CANCER SCREENING  03/21/2027    HEPATITIS C SCREENING  Completed    COVID-19 Vaccine  Completed    ZOSTER VACCINE  Completed    Pneumococcal Vaccine 0-64  Aged Out          CMS Preventative Services Quick Reference  Risk Factors Identified During Encounter  Immunizations Discussed/Encouraged: Tdap, Influenza, and COVID19  The above risks/problems have been discussed with the patient.  Pertinent information has been shared with the patient in the After Visit Summary.  An After Visit Summary and PPPS were made available to the patient.    Follow Up:   Next Medicare Wellness visit to be scheduled in 1 year.     Additional E&M Note during same encounter follows:  Patient has multiple medical problems which are significant and separately identifiable that require additional work above and beyond the Medicare Wellness Visit.      Chief Complaint:  Blood pressure, cholesterol    Subjective    History of Present Illness:  In addition to the Medicare wellness exam, Chely is also here for follow-up on her usual care.  She has hypertension and remains on treatment for this as noted.  Her blood pressure is in a good range here.  She does check it intermittently at home and says it typically runs in a good range there.  She is not having obvious side effects from the medications.    She also takes cholesterol-lowering medication and seems to tolerate it well.    Review of Systems:  Review of Systems   Constitutional:  Negative for chills and fever.  "  Respiratory:  Negative for cough and shortness of breath.    Cardiovascular:  Negative for chest pain and palpitations.   Gastrointestinal:  Negative for abdominal pain, nausea and vomiting.      Objective   Vital Signs:  /61 (BP Location: Left arm, Patient Position: Sitting)   Pulse 67   Temp 97.9 °F (36.6 °C) (Oral)   Ht 162.6 cm (64.02\")   Wt 85.8 kg (189 lb 3.2 oz)   SpO2 100%   BMI 32.46 kg/m²     Physical Exam  Vitals and nursing note reviewed.   Constitutional:       General: She is not in acute distress.     Appearance: She is obese. She is not ill-appearing.   HENT:      Right Ear: Tympanic membrane and ear canal normal.      Left Ear: Tympanic membrane and ear canal normal.      Ears:      Comments: Hearing is normal with forced whisper bilaterally.     Mouth/Throat:      Mouth: Mucous membranes are moist.      Comments: Pharynx appears normal  Eyes:      Extraocular Movements: Extraocular movements intact.      Pupils: Pupils are equal, round, and reactive to light.      Comments: Binocular vision is 20/20 with correction.   Neck:      Thyroid: No thyromegaly.   Cardiovascular:      Rate and Rhythm: Normal rate and regular rhythm.      Heart sounds: Murmur heard.   Pulmonary:      Effort: Pulmonary effort is normal.      Breath sounds: Normal breath sounds.   Abdominal:      General: There is no distension.      Palpations: Abdomen is soft. There is no mass.      Tenderness: There is no abdominal tenderness.   Musculoskeletal:      Cervical back: Normal range of motion.   Skin:     Findings: No lesion or rash.   Neurological:      General: No focal deficit present.      Mental Status: She is oriented to person, place, and time.      Cranial Nerves: No cranial nerve deficit.      Motor: No weakness.      Coordination: Coordination normal.      Gait: Gait normal.   Psychiatric:         Mood and Affect: Mood normal.       The following data was reviewed by Manoj Ybarra MD on " 10/04/2024.  Lab Results   Component Value Date    WBC 5.32 08/05/2024    HGB 12.2 08/05/2024    HCT 38.9 08/05/2024    MCV 90.3 08/05/2024     08/05/2024     Lab Results   Component Value Date    GLUCOSE 114 (H) 08/05/2024    BUN 18 08/05/2024    CREATININE 1.10 (H) 08/05/2024     08/05/2024    K 4.2 08/05/2024     08/05/2024    CO2 20.0 (L) 08/05/2024    CALCIUM 9.1 08/05/2024    PROTEINTOT 6.8 08/05/2024    ALBUMIN 4.0 08/05/2024    ALT 17 08/05/2024    AST 21 08/05/2024    ALKPHOS 82 08/05/2024    BILITOT 0.5 08/05/2024    EGFR 57.3 (L) 08/05/2024    GLOB 2.8 08/05/2024    AGRATIO 1.4 08/05/2024    BCR 16.4 08/05/2024    ANIONGAP 15.0 08/05/2024      Lab Results   Component Value Date    CHOL 139 08/05/2024    CHLPL 175 03/22/2021    TRIG 68 08/05/2024    HDL 60 08/05/2024    LDL 65 08/05/2024     Lab Results   Component Value Date    TSH 2.490 08/05/2024     Lab Results   Component Value Date    HGBA1C 5.6 08/09/2024             Assessment and Plan:   Today, we have reviewed her care.  Chely is doing well overall.  Regarding the Medicare wellness exam, she is currently up-to-date on recommended cancer screenings.  I am going to review her chart further though to make sure we have a copy of her most recent colonoscopy.  We might reach out to her about this.  We also reviewed vaccines as noted above and we will move ahead with COVID and flu vaccines today.    Her blood pressure is well-controlled today, and her cholesterol was in a good range on the most recent check.  I am not recommending any specific change with her medications.  They have recently been refilled, and we will continue them at this time.  Her creatinine was just above normal on most recent check, and we will recheck it and urine test today as a precaution.  Otherwise, tentative follow-up with me will be again in about a year, sooner if needed.    Diagnoses and all orders for this visit:    1. Physical exam (Primary)    2.  Elevated serum creatinine  -     Urinalysis With Microscopic - Urine, Clean Catch; Future  -     Basic Metabolic Panel; Future    3. Essential hypertension  -     Urinalysis With Microscopic - Urine, Clean Catch; Future  -     Basic Metabolic Panel; Future    4. Mixed hyperlipidemia  -     Basic Metabolic Panel; Future    5. Encounter for immunization  -     Fluzone High-Dose 65+yrs ([id number])  -     COVID-19 (Pfizer) 12yrs+ (COMIRNATY)       Follow Up  Return in about 1 year (around 10/4/2025) for Recheck, Medicare Wellness.  Patient was given instructions and counseling regarding her condition or for health maintenance advice. Please see specific information pulled into the AVS if appropriate.

## 2024-11-08 NOTE — PROGRESS NOTES
Chief Complaint  Rapid Heart Rate and Fatigue    Subjective        History of Present Illness  Chely Calderón presents to [facility] CARDIOLOGY   Ms. Calderón is a 63-year-old female patient coming in today for cardiac follow-up.  She reports recently she has been having some mild symptoms of palpitations.  She describes a fluttering feeling in her chest, and at times noting her heart rate going faster, the most she has noticed close to 100.  Along the symptoms she has had is lasted 10 to 15 minutes.  She states she first noticed this a couple days after giving blood.  States she has been drinking quite a bit of scott tea and stopped this without any change in her symptoms.  She also question if it could be related to medication Colestid.        Past History:     (1) Fibromyalgia. (2) Hypertension. (3) Hyperlipidemia. (4) Negative for coronary artery disease or diabetes mellitus.       Past Medical History:   Diagnosis Date    Arthritis     Dysuria     Essential (primary) hypertension     Family history of malignant neoplasm of breast     Fever     Fibromyalgia     Fibromyalgia, primary 2001    Foot pain, right     GERD without esophagitis     Heart murmur     Low back pain     Mixed hyperlipidemia     Other fatigue     Other specified anxiety disorders     Spontaneous ecchymoses     Unspecified abdominal pain     Unspecified lump in the right breast, unspecified quadrant        Allergies   Allergen Reactions    Lisinopril Cough        Past Surgical History:   Procedure Laterality Date    CARDIAC CATHETERIZATION      CHOLECYSTECTOMY      COLONOSCOPY      ENDOMETRIAL ABLATION      TUBAL ABDOMINAL LIGATION          Social History  She  reports that she quit smoking about 11 years ago. Her smoking use included cigarettes. She started smoking about 32 years ago. She has a 5.3 pack-year smoking history. She has never used smokeless tobacco. She reports current alcohol use. She reports that she does not  "use drugs.    Family History  Her family history includes Arthritis in her father, mother, and sister; Breast cancer in her sister; Cancer in her sister; Diabetes type II in her mother; Heart disease in her mother and sister; Hyperlipidemia in her mother.       Current Outpatient Medications on File Prior to Visit   Medication Sig    amLODIPine (NORVASC) 10 MG tablet TAKE 1 TABLET EVERY DAY    atorvastatin (LIPITOR) 20 MG tablet Take 1 tablet by mouth Daily.    carvedilol (COREG) 6.25 MG tablet Take 1 tablet by mouth 2 (Two) Times a Day.    losartan (COZAAR) 100 MG tablet Take 1 tablet by mouth Daily.    omeprazole (priLOSEC) 40 MG capsule Take 1 capsule by mouth Daily.    colestipol (COLESTID) 1 g tablet Take 1 tablet by mouth 2 (Two) Times a Day.    [DISCONTINUED] aspirin 81 MG chewable tablet Chew 1 tablet Daily.    [DISCONTINUED] azelastine (ASTELIN) 0.1 % nasal spray 2 sprays into the nostril(s) as directed by provider 2 (Two) Times a Day. Use in each nostril as directed    [DISCONTINUED] baclofen (LIORESAL) 10 MG tablet Take 1 tablet by mouth 3 (Three) Times a Day. (Patient not taking: Reported on 2/22/2024)    [DISCONTINUED] DULoxetine (CYMBALTA) 30 MG capsule Take 1 capsule by mouth Daily. (Patient not taking: Reported on 2/22/2024)    [DISCONTINUED] multivitamin with minerals tablet tablet Take 1 tablet by mouth Daily.     No current facility-administered medications on file prior to visit.         Review of Systems   Respiratory:  Negative for cough, chest tightness and shortness of breath.    Cardiovascular:  Positive for palpitations. Negative for chest pain and leg swelling.   Gastrointestinal:  Negative for nausea and vomiting.   Neurological:  Negative for dizziness and syncope.        Objective   Vitals:    07/30/24 1434   BP: 134/74   Pulse: 68   Weight: 86.6 kg (191 lb)   Height: 162.6 cm (64\")         Physical Exam  General : Alert, awake, no acute distress  Neck : Supple, no carotid bruit, no " jugular venous distention  CVS : Regular rate and rhythm, no murmur, no rubs or gallops  Lungs: Clear to auscultation bilaterally, no crackles or rhonchi  Abdomen: Soft, nontender, bowel sounds active  Extremities: Warm, well-perfused, no pedal edema      Result Review     The following data was reviewed by VALARIE Porter  proBNP   Date Value Ref Range Status   01/06/2024 41.1 0.0 - 900.0 pg/mL Final     CMP          1/6/2024    17:52 1/9/2024    14:36 2/22/2024    11:19   CMP   Glucose 107  95  96    BUN 19  21  12    Creatinine 1.70  0.78  0.94    EGFR 34.0  86.5  69.2    Sodium 141  141  143    Potassium 4.0  4.4  4.7    Chloride 106  107  105    Calcium 9.1  9.7  9.6    Total Protein 7.5   6.9    Albumin 4.5   4.9    Globulin 3.0   2.0    Total Bilirubin 0.4   0.6    Alkaline Phosphatase 96   93    AST (SGOT) 21   22    ALT (SGPT) 18   20    Albumin/Globulin Ratio 1.5   2.5    BUN/Creatinine Ratio 11.2  26.9  12.8    Anion Gap 13.3  10.2  11.0      CBC w/diff          1/6/2024    17:52   CBC w/Diff   WBC 6.43    RBC 4.75    Hemoglobin 13.3    Hematocrit 41.1    MCV 86.5    MCH 28.0    MCHC 32.4    RDW 14.4    Platelets 300    Neutrophil Rel % 46.3    Immature Granulocyte Rel % 0.2    Lymphocyte Rel % 46.0    Monocyte Rel % 5.0    Eosinophil Rel % 2.0    Basophil Rel % 0.5       Lab Results   Component Value Date    TSH 2.250 03/27/2023        Lab Results   Component Value Date    TROPONINT 6 01/06/2024                          Assessment and Plan   Diagnoses and all orders for this visit:    1. Palpitations (Primary)  Assessment & Plan:  Will check 48-hour Holter monitor to evaluate symptoms of palpitations.  See if she has significant ectopy or arrhythmia.  Recommend staying well-hydrated, limit caffeine intake.  Will also check her electrolytes and thyroid function.    Orders:  -     Comprehensive Metabolic Panel; Future  -     Holter Monitor - 48 Hour; Future  -     TSH; Future  -     CBC (No Diff);  Future    2. Essential hypertension  Assessment & Plan:  Blood pressure is well-controlled.  Continue current doses of losartan, carvedilol, and hydrochlorothiazide.  Previous labs show normal joint renal function.    Orders:  -     Comprehensive Metabolic Panel; Future    3. Mixed hyperlipidemia  Assessment & Plan:   Uncertain lipid control.  Will recheck fasting lipid profile.  In the meantime continue current dose atorvastatin.    Orders:  -     Lipid Panel; Future  -     Comprehensive Metabolic Panel; Future                Follow Up   Return for Will schedule after testing completed.    Patient was given instructions and counseling regarding her condition or for health maintenance advice. Please see specific information pulled into the AVS if appropriate.     Signed,  Netta Vanegas, APRN  07/30/2024     Dictated Utilizing Dragon Dictation: Please note that portions of this note were completed with a voice recognition program.  Part of this note may be an electronic transcription/translation of spoken language to printed text using the Dragon Dictation System.     No

## 2024-11-14 ENCOUNTER — TELEPHONE (OUTPATIENT)
Dept: FAMILY MEDICINE CLINIC | Age: 64
End: 2024-11-14
Payer: MEDICARE

## 2024-11-14 DIAGNOSIS — Z78.0 POSTMENOPAUSAL: Primary | ICD-10-CM

## 2024-11-14 DIAGNOSIS — Z12.11 SCREEN FOR COLON CANCER: ICD-10-CM

## 2024-11-14 DIAGNOSIS — Z80.0 FH: COLON CANCER: ICD-10-CM

## 2024-11-14 NOTE — TELEPHONE ENCOUNTER
Pt inf re tickle, orders placed. Pt also was wondering about her colonoscopy if she is due, last one in our chart was from 2017, states her father was recently diagnosed with colon cancer.

## 2024-11-14 NOTE — TELEPHONE ENCOUNTER
I would recommend moving ahead with colonoscopy.  Please refer to Dr. Keating or provider of choice for the diagnoses in this note.  Thanks.

## 2024-12-16 ENCOUNTER — HOSPITAL ENCOUNTER (OUTPATIENT)
Dept: BONE DENSITY | Facility: HOSPITAL | Age: 64
Discharge: HOME | End: 2024-12-16
Admitting: FAMILY MEDICINE
Payer: MEDICARE

## 2024-12-16 DIAGNOSIS — Z78.0 POSTMENOPAUSAL: ICD-10-CM

## 2024-12-16 PROCEDURE — 77080 DXA BONE DENSITY AXIAL: CPT

## 2024-12-30 ENCOUNTER — OFFICE VISIT (OUTPATIENT)
Dept: FAMILY MEDICINE CLINIC | Age: 64
End: 2024-12-30
Payer: MEDICARE

## 2024-12-30 VITALS
OXYGEN SATURATION: 99 % | SYSTOLIC BLOOD PRESSURE: 148 MMHG | DIASTOLIC BLOOD PRESSURE: 83 MMHG | BODY MASS INDEX: 33.87 KG/M2 | TEMPERATURE: 97.8 F | WEIGHT: 198.4 LBS | HEIGHT: 64 IN | HEART RATE: 57 BPM

## 2024-12-30 DIAGNOSIS — R21 RASH: Primary | ICD-10-CM

## 2024-12-30 DIAGNOSIS — R10.84 GENERALIZED ABDOMINAL PAIN: ICD-10-CM

## 2024-12-30 DIAGNOSIS — D22.9 CHANGE IN MOLE: ICD-10-CM

## 2024-12-30 PROCEDURE — 1125F AMNT PAIN NOTED PAIN PRSNT: CPT

## 2024-12-30 PROCEDURE — 99213 OFFICE O/P EST LOW 20 MIN: CPT

## 2024-12-30 PROCEDURE — 1159F MED LIST DOCD IN RCRD: CPT

## 2024-12-30 PROCEDURE — 3079F DIAST BP 80-89 MM HG: CPT

## 2024-12-30 PROCEDURE — 1160F RVW MEDS BY RX/DR IN RCRD: CPT

## 2024-12-30 PROCEDURE — 3077F SYST BP >= 140 MM HG: CPT

## 2024-12-30 RX ORDER — PREDNISONE 5 MG/1
TABLET ORAL
Qty: 36 TABLET | Refills: 0 | Status: SHIPPED | OUTPATIENT
Start: 2024-12-30

## 2024-12-30 RX ORDER — CETIRIZINE HYDROCHLORIDE 10 MG/1
10 TABLET ORAL DAILY
Qty: 30 TABLET | Refills: 0 | Status: SHIPPED | OUTPATIENT
Start: 2024-12-30

## 2024-12-30 NOTE — PROGRESS NOTES
Subjective     CHIEF COMPLAINT    Chief Complaint   Patient presents with    Rash     Torso, bilateral flanks, bilateral arms. Onset Friday. Pt concerned with shingles.      History of Present Illness  Patient is a 64 year old female, presenting to the clinic today with complaints of a rash. Rash has been present for 4 days. Rash is located to torso and under her bilateral arms near her axilla. Rash is itching. Rash started on her left side. No changes to soaps, lotions, detergents. No recent medication changes. She has not been outside a lot. No one in her family has a similar rash. She has tried antibiotic ointment and coconut oil on the rash.     She notes abdominal pain. This has been present for a few days as well. No nausea, vomiting, constipation. Has noted some diarrhea and soft stools. No fever. No recent travel. Has had a cholecystectomy. She reports having had abdominal pain like this before. She had EGD over the summer with Dr. Morales and states multiple polyps in stomach which were biopsied.       Review of Systems   Constitutional:  Positive for chills. Negative for fever.   Gastrointestinal:  Positive for abdominal pain and diarrhea. Negative for anal bleeding, blood in stool, nausea and vomiting.   Skin:  Positive for rash.       Past Medical History:   Diagnosis Date    Arthritis     Dysuria     Essential (primary) hypertension     Family history of malignant neoplasm of breast     Fever     Fibromyalgia     Fibromyalgia, primary 2001    Foot pain, right     GERD without esophagitis     Heart murmur     Low back pain     Mixed hyperlipidemia     Other fatigue     Other specified anxiety disorders     Spontaneous ecchymoses     Unspecified abdominal pain     Unspecified lump in the right breast, unspecified quadrant             Past Surgical History:   Procedure Laterality Date    CARDIAC CATHETERIZATION      CHOLECYSTECTOMY      COLONOSCOPY  03/21/2017    Scattered sigmoid diverticulosis     COLONOSCOPY  2008    Colonic spasticity    ENDOMETRIAL ABLATION      ENDOSCOPY  2024    Gastritis, fundic gland polyp    ENDOSCOPY  2008    Gastritis, bile reflux    ENDOSCOPY  2010    Gastritis, bile reflux    TUBAL ABDOMINAL LIGATION              Family History   Problem Relation Age of Onset    Hyperlipidemia Mother     Diabetes type II Mother     Arthritis Mother     Heart disease Mother     Arthritis Father     Colon cancer Father     Breast cancer Sister     Arthritis Sister     Cancer Sister     Heart disease Sister             Social History     Socioeconomic History    Marital status:     Number of children: 2   Tobacco Use    Smoking status: Former     Current packs/day: 0.00     Average packs/day: 0.3 packs/day for 21.2 years (5.3 ttl pk-yrs)     Types: Cigarettes     Start date: 1992     Quit date: 2013     Years since quittin.0    Smokeless tobacco: Never    Tobacco comments:     off and on from her 30s    Vaping Use    Vaping status: Never Used   Substance and Sexual Activity    Alcohol use: Yes     Comment: 4-5 times a week    Drug use: Never    Sexual activity: Not Currently     Partners: Male     Birth control/protection: None            Allergies   Allergen Reactions    Lisinopril Cough            Current Outpatient Medications on File Prior to Visit   Medication Sig Dispense Refill    amLODIPine (NORVASC) 10 MG tablet Take 1 tablet by mouth Daily. 90 tablet 3    aspirin 81 MG EC tablet Take 1 tablet by mouth Daily.      atorvastatin (LIPITOR) 20 MG tablet Take 1 tablet by mouth Daily. 90 tablet 3    carvedilol (COREG) 12.5 MG tablet Take 1 tablet by mouth 2 (Two) Times a Day. 14 tablet 0    cyclobenzaprine (FLEXERIL) 10 MG tablet Take 1 tablet by mouth 2 (Two) Times a Day As Needed for Muscle Spasms. 30 tablet 1    losartan (COZAAR) 100 MG tablet Take 1 tablet by mouth Daily. 90 tablet 3    omeprazole (priLOSEC) 40 MG capsule Take 1 capsule by mouth  "Daily. 90 capsule 3     No current facility-administered medications on file prior to visit.       /83   Pulse 57   Temp 97.8 °F (36.6 °C) (Oral)   Ht 162.6 cm (64.02\")   Wt 90 kg (198 lb 6.4 oz)   SpO2 99% Comment: room air  BMI 34.04 kg/m²          Objective     Physical Exam  Vitals and nursing note reviewed.   Constitutional:       General: She is not in acute distress.     Appearance: Normal appearance. She is not ill-appearing.   Pulmonary:      Effort: Pulmonary effort is normal. No respiratory distress.   Abdominal:      General: Bowel sounds are normal. There is no distension.      Palpations: Abdomen is soft.      Tenderness: There is no abdominal tenderness. There is no guarding or rebound.   Skin:     Findings: Rash present.      Comments: Scattered, pink/erythematous papules to abdomen and bilateral upper extremities near axilla. Rash without ulceration or vesicular appearance. No drainage or bleeding from rash. Does not follow dermatomal pattern    There is a small, dark mole located to right upper arm. No bleeding noted   Neurological:      General: No focal deficit present.      Mental Status: She is alert and oriented to person, place, and time.   Psychiatric:         Mood and Affect: Mood and affect normal.         Behavior: Behavior normal.         Assessment & Plan  Rash  Unclear etiology. Rash is pruritic, without drainage, bleeding or vesicular appearance. Treating with prednisone taper as below and zyrtec. Can use cool compresses. Avoid scratching. Return and ER precautions advised.     Orders:    predniSONE (DELTASONE) 5 MG tablet; Take 8 tabs PO the first day, then 7 the following day, then 6 the day after, and so on decreasing by 1 tab per day until medication is gone    cetirizine (zyrTEC) 10 MG tablet; Take 1 tablet by mouth Daily.    Generalized abdominal pain  Reviewed recent note from Dr. Morales. Patient declines further workup today. No acute findings on exam.        Change " in mole  Mole to right arm has gotten bigger, she has had it her whole life but never had it evaluated. Recommend follow up with dermatology for further evaluation.                 Follow up:  Return if symptoms worsen or fail to improve.  Patient was given instructions and counseling regarding her condition or for health maintenance advice. Please see specific information pulled into the AVS if appropriate.

## 2025-02-28 ENCOUNTER — LAB (OUTPATIENT)
Dept: LAB | Facility: HOSPITAL | Age: 65
End: 2025-02-28
Payer: MEDICARE

## 2025-02-28 ENCOUNTER — OFFICE VISIT (OUTPATIENT)
Dept: FAMILY MEDICINE CLINIC | Age: 65
End: 2025-02-28
Payer: MEDICARE

## 2025-02-28 VITALS
OXYGEN SATURATION: 98 % | HEART RATE: 67 BPM | WEIGHT: 203.6 LBS | BODY MASS INDEX: 34.76 KG/M2 | DIASTOLIC BLOOD PRESSURE: 60 MMHG | HEIGHT: 64 IN | TEMPERATURE: 97.4 F | SYSTOLIC BLOOD PRESSURE: 137 MMHG

## 2025-02-28 DIAGNOSIS — F32.A CHRONIC DEPRESSION: ICD-10-CM

## 2025-02-28 DIAGNOSIS — R63.5 WEIGHT GAIN: ICD-10-CM

## 2025-02-28 DIAGNOSIS — R53.83 FATIGUE, UNSPECIFIED TYPE: ICD-10-CM

## 2025-02-28 DIAGNOSIS — E78.2 MIXED HYPERLIPIDEMIA: ICD-10-CM

## 2025-02-28 DIAGNOSIS — K57.90 DIVERTICULOSIS: Primary | ICD-10-CM

## 2025-02-28 DIAGNOSIS — I10 ESSENTIAL HYPERTENSION: ICD-10-CM

## 2025-02-28 DIAGNOSIS — Z12.31 ENCOUNTER FOR SCREENING MAMMOGRAM FOR MALIGNANT NEOPLASM OF BREAST: ICD-10-CM

## 2025-02-28 DIAGNOSIS — K21.9 GERD WITHOUT ESOPHAGITIS: ICD-10-CM

## 2025-02-28 LAB
ALBUMIN SERPL-MCNC: 4 G/DL (ref 3.5–5.2)
ALBUMIN/GLOB SERPL: 1.3 G/DL
ALP SERPL-CCNC: 92 U/L (ref 39–117)
ALT SERPL W P-5'-P-CCNC: 15 U/L (ref 1–33)
ANION GAP SERPL CALCULATED.3IONS-SCNC: 10 MMOL/L (ref 5–15)
AST SERPL-CCNC: 21 U/L (ref 1–32)
BASOPHILS # BLD AUTO: 0.03 10*3/MM3 (ref 0–0.2)
BASOPHILS NFR BLD AUTO: 0.6 % (ref 0–1.5)
BILIRUB SERPL-MCNC: 0.4 MG/DL (ref 0–1.2)
BUN SERPL-MCNC: 17 MG/DL (ref 8–23)
BUN/CREAT SERPL: 14 (ref 7–25)
CALCIUM SPEC-SCNC: 9.8 MG/DL (ref 8.6–10.5)
CHLORIDE SERPL-SCNC: 106 MMOL/L (ref 98–107)
CO2 SERPL-SCNC: 27 MMOL/L (ref 22–29)
CREAT SERPL-MCNC: 1.21 MG/DL (ref 0.57–1)
DEPRECATED RDW RBC AUTO: 44 FL (ref 37–54)
EGFRCR SERPLBLD CKD-EPI 2021: 50.8 ML/MIN/1.73
EOSINOPHIL # BLD AUTO: 0.13 10*3/MM3 (ref 0–0.4)
EOSINOPHIL NFR BLD AUTO: 2.4 % (ref 0.3–6.2)
ERYTHROCYTE [DISTWIDTH] IN BLOOD BY AUTOMATED COUNT: 13.6 % (ref 12.3–15.4)
FOLATE SERPL-MCNC: >20 NG/ML (ref 4.78–24.2)
GLOBULIN UR ELPH-MCNC: 3 GM/DL
GLUCOSE SERPL-MCNC: 110 MG/DL (ref 65–99)
HCT VFR BLD AUTO: 39.6 % (ref 34–46.6)
HGB BLD-MCNC: 12.5 G/DL (ref 12–15.9)
IMM GRANULOCYTES # BLD AUTO: 0.01 10*3/MM3 (ref 0–0.05)
IMM GRANULOCYTES NFR BLD AUTO: 0.2 % (ref 0–0.5)
LYMPHOCYTES # BLD AUTO: 2.25 10*3/MM3 (ref 0.7–3.1)
LYMPHOCYTES NFR BLD AUTO: 41.7 % (ref 19.6–45.3)
MCH RBC QN AUTO: 27.5 PG (ref 26.6–33)
MCHC RBC AUTO-ENTMCNC: 31.6 G/DL (ref 31.5–35.7)
MCV RBC AUTO: 87 FL (ref 79–97)
MONOCYTES # BLD AUTO: 0.31 10*3/MM3 (ref 0.1–0.9)
MONOCYTES NFR BLD AUTO: 5.7 % (ref 5–12)
NEUTROPHILS NFR BLD AUTO: 2.67 10*3/MM3 (ref 1.7–7)
NEUTROPHILS NFR BLD AUTO: 49.4 % (ref 42.7–76)
PLATELET # BLD AUTO: 311 10*3/MM3 (ref 140–450)
PMV BLD AUTO: 9.5 FL (ref 6–12)
POTASSIUM SERPL-SCNC: 4.7 MMOL/L (ref 3.5–5.2)
PROT SERPL-MCNC: 7 G/DL (ref 6–8.5)
RBC # BLD AUTO: 4.55 10*6/MM3 (ref 3.77–5.28)
SODIUM SERPL-SCNC: 143 MMOL/L (ref 136–145)
T4 FREE SERPL-MCNC: 1.24 NG/DL (ref 0.92–1.68)
TSH SERPL DL<=0.05 MIU/L-ACNC: 2.68 UIU/ML (ref 0.27–4.2)
VIT B12 BLD-MCNC: 405 PG/ML (ref 211–946)
WBC NRBC COR # BLD AUTO: 5.4 10*3/MM3 (ref 3.4–10.8)

## 2025-02-28 PROCEDURE — 85025 COMPLETE CBC W/AUTO DIFF WBC: CPT

## 2025-02-28 PROCEDURE — 84439 ASSAY OF FREE THYROXINE: CPT

## 2025-02-28 PROCEDURE — 82746 ASSAY OF FOLIC ACID SERUM: CPT

## 2025-02-28 PROCEDURE — 84443 ASSAY THYROID STIM HORMONE: CPT

## 2025-02-28 PROCEDURE — 80053 COMPREHEN METABOLIC PANEL: CPT

## 2025-02-28 PROCEDURE — 36415 COLL VENOUS BLD VENIPUNCTURE: CPT

## 2025-02-28 PROCEDURE — 82607 VITAMIN B-12: CPT

## 2025-02-28 RX ORDER — LOSARTAN POTASSIUM 100 MG/1
100 TABLET ORAL DAILY
Qty: 90 TABLET | Refills: 3 | Status: SHIPPED | OUTPATIENT
Start: 2025-02-28

## 2025-02-28 RX ORDER — CARVEDILOL 12.5 MG/1
12.5 TABLET ORAL 2 TIMES DAILY
Qty: 180 TABLET | Refills: 3 | Status: SHIPPED | OUTPATIENT
Start: 2025-02-28

## 2025-02-28 RX ORDER — COLESTIPOL HYDROCHLORIDE 1 G/1
1 TABLET ORAL DAILY
COMMUNITY
Start: 2025-02-10

## 2025-02-28 RX ORDER — CHOLECALCIFEROL (VITAMIN D3) 25 MCG
1000 TABLET ORAL DAILY
COMMUNITY

## 2025-02-28 RX ORDER — OMEPRAZOLE 40 MG/1
40 CAPSULE, DELAYED RELEASE ORAL DAILY
Qty: 90 CAPSULE | Refills: 3 | Status: SHIPPED | OUTPATIENT
Start: 2025-02-28

## 2025-02-28 RX ORDER — AMLODIPINE BESYLATE 10 MG/1
10 TABLET ORAL DAILY
Qty: 90 TABLET | Refills: 3 | Status: SHIPPED | OUTPATIENT
Start: 2025-02-28

## 2025-02-28 RX ORDER — ATORVASTATIN CALCIUM 20 MG/1
20 TABLET, FILM COATED ORAL DAILY
Qty: 90 TABLET | Refills: 3 | Status: SHIPPED | OUTPATIENT
Start: 2025-02-28

## 2025-02-28 NOTE — PROGRESS NOTES
Chely Calderón presents to [facility].    Chief Complaint:  Colonoscopy follow up    Subjective   History of Present Illness:  Chely is being seen today for follow-up on her care.  She had colonoscopy on 2/10/2025 at [facility].  The colonoscopy was normal other than scattered diverticulosis.  She primarily had a colonoscopy because of a family history of colon cancer with her father.  She states she did have some abdominal pain after the colonoscopy.    She has felt more down since the procedure.  She says she has been having bouts of depression.  She denies suicidal ideation with this.  She states that she has been on several antidepressants over the years.  She is not currently taking any medication for this though.    Chely has also experienced weight gain over the last few weeks.  She is not sure why that has happened.  She does not think she is eating more.    Review of Systems:  Review of Systems   Constitutional:  Negative for chills and fever.   Respiratory:  Negative for cough and shortness of breath.    Cardiovascular:  Negative for chest pain and palpitations.   Gastrointestinal:  Positive for abdominal pain. Negative for nausea and vomiting.      Objective   Medical History:  Past Medical History:    Arthritis    Dysuria    Essential (primary) hypertension    Family history of malignant neoplasm of breast    Fever    Fibromyalgia    Fibromyalgia, primary    Foot pain, right    GERD without esophagitis    Heart murmur    Low back pain    Mixed hyperlipidemia    Other fatigue    Other specified anxiety disorders    Spontaneous ecchymoses    Unspecified abdominal pain    Unspecified lump in the right breast, unspecified quadrant     Past Surgical History:    CARDIAC CATHETERIZATION    CHOLECYSTECTOMY    COLONOSCOPY    Scattered sigmoid diverticulosis    COLONOSCOPY    Colonic spasticity    COLONOSCOPY    Scattered diverticulosis    ENDOMETRIAL ABLATION    ENDOSCOPY     Gastritis, fundic gland polyp    ENDOSCOPY    Gastritis, bile reflux    ENDOSCOPY    Gastritis, bile reflux    TUBAL ABDOMINAL LIGATION      Family History   Problem Relation Age of Onset    Hyperlipidemia Mother     Diabetes type II Mother     Arthritis Mother     Heart disease Mother     Arthritis Father     Colon cancer Father     Breast cancer Sister     Arthritis Sister     Cancer Sister     Heart disease Sister      Social History     Tobacco Use    Smoking status: Former     Current packs/day: 0.00     Average packs/day: 0.3 packs/day for 21.2 years (5.3 ttl pk-yrs)     Types: Cigarettes     Start date: 1992     Quit date: 2013     Years since quittin.1    Smokeless tobacco: Never    Tobacco comments:     off and on from her 30s    Substance Use Topics    Alcohol use: Yes     Comment: 4-5 times a week       Health Maintenance Due   Topic Date Due    TDAP/TD VACCINES (1 - Tdap) Never done    Pneumococcal Vaccine 50+ (1 of 1 - PCV) Never done        Immunization History   Administered Date(s) Administered    ABRYSVO (RSV, 60+ or pregnant women 32-36 wks) 2024    COVID-19 (MODERNA) 1st,2nd,3rd Dose Monovalent 2021, 2021, 2021    COVID-19 (PFIZER) 12YRS+ (COMIRNATY) 10/26/2023, 10/04/2024    COVID-19 (PFIZER) BIVALENT 12+YRS 2022    Covid-19 (Pfizer) Gray Cap Monovalent 2022    Flu Vaccine Intradermal Quad 18-64YR 10/16/2012, 2016, 2017    Flublok 18+yrs 10/15/2021, 10/26/2023    Fluzone  >6mos 2013, 10/04/2024    Fluzone (or Fluarix & Flulaval for VFC) >6mos 2022    Influenza Seasonal Injectable 10/16/2012, 2016, 2017    Influenza, Unspecified 10/22/2020    Shingrix 2024, 2024       Allergies   Allergen Reactions    Lisinopril Cough        Medications:    Current Outpatient Medications:     amLODIPine (NORVASC) 10 MG tablet, Take 1 tablet by mouth Daily., Disp: 90 tablet, Rfl: 3    aspirin 81 MG EC tablet, Take 1  "tablet by mouth Daily., Disp: , Rfl:     atorvastatin (LIPITOR) 20 MG tablet, Take 1 tablet by mouth Daily., Disp: 90 tablet, Rfl: 3    carvedilol (COREG) 12.5 MG tablet, Take 1 tablet by mouth 2 (Two) Times a Day., Disp: 180 tablet, Rfl: 3    Cholecalciferol 25 MCG (1000 UT) tablet, Take 1 tablet by mouth Daily., Disp: , Rfl:     colestipol (COLESTID) 1 g tablet, Take 1 tablet by mouth Daily. (Patient taking differently: Take 1 tablet by mouth Daily As Needed.), Disp: , Rfl:     cyclobenzaprine (FLEXERIL) 10 MG tablet, Take 1 tablet by mouth 2 (Two) Times a Day As Needed for Muscle Spasms., Disp: 30 tablet, Rfl: 1    losartan (COZAAR) 100 MG tablet, Take 1 tablet by mouth Daily., Disp: 90 tablet, Rfl: 3    multivitamin with minerals (PRESERVISION AREDS PO), Take 1 tablet by mouth Daily., Disp: , Rfl:     omeprazole (priLOSEC) 40 MG capsule, Take 1 capsule by mouth Daily., Disp: 90 capsule, Rfl: 3    Vital Signs:   /60 (BP Location: Right arm, Patient Position: Sitting)   Pulse 67   Temp 97.4 °F (36.3 °C) (Oral)   Ht 162.6 cm (64.02\")   Wt 92.4 kg (203 lb 9.6 oz)   SpO2 98%   BMI 34.93 kg/m²       Physical Exam:  Physical Exam  Vitals reviewed.   Constitutional:       General: She is not in acute distress.     Appearance: She is obese. She is not ill-appearing.   Eyes:      Pupils: Pupils are equal, round, and reactive to light.   Neck:      Comments: No thyromegaly  Cardiovascular:      Rate and Rhythm: Normal rate and regular rhythm.   Pulmonary:      Effort: Pulmonary effort is normal.      Breath sounds: Normal breath sounds.   Abdominal:      General: There is no distension.      Palpations: Abdomen is soft.      Tenderness: There is no abdominal tenderness.   Musculoskeletal:      Cervical back: Neck supple.   Lymphadenopathy:      Cervical: No cervical adenopathy.   Skin:     Findings: No lesion or rash.   Neurological:      General: No focal deficit present.      Mental Status: She is alert.      " Cranial Nerves: No cranial nerve deficit.      Motor: No weakness.   Psychiatric:         Mood and Affect: Mood normal.         Behavior: Behavior normal.         Result Review   The following data was reviewed by Manoj Ybarra MD on 02/28/2025.  Lab Results   Component Value Date    WBC 5.32 08/05/2024    HGB 12.2 08/05/2024    HCT 38.9 08/05/2024    MCV 90.3 08/05/2024     08/05/2024     Lab Results   Component Value Date    GLUCOSE 98 10/04/2024    BUN 16 10/04/2024    CREATININE 0.90 10/04/2024     10/04/2024    K 4.4 10/04/2024     10/04/2024    CALCIUM 9.5 10/04/2024    PROTEINTOT 6.8 08/05/2024    ALBUMIN 4.0 08/05/2024    ALT 17 08/05/2024    AST 21 08/05/2024    ALKPHOS 82 08/05/2024    BILITOT 0.5 08/05/2024    GLOB 2.8 08/05/2024    AGRATIO 1.4 08/05/2024    BCR 17.8 10/04/2024    ANIONGAP 9.0 10/04/2024    EGFR 72.4 10/04/2024     Lab Results   Component Value Date    CHOL 139 08/05/2024    CHLPL 175 03/22/2021    TRIG 68 08/05/2024    HDL 60 08/05/2024    LDL 65 08/05/2024     Lab Results   Component Value Date    TSH 2.490 08/05/2024     Lab Results   Component Value Date    HGBA1C 5.6 08/09/2024          Assessment and Plan:   Today, we have reviewed her care.  Overall, Chely seems well today.  We have reviewed her colonoscopy and biopsy results.  She is already taking a fiber supplement which may be of benefit with diverticulosis.  She is concerned about weight gain and how she has been feeling over the last 2.5 weeks or so.  We will update labs today as a precaution.  We discussed whether to pursue additional antidepressant therapy, but she would like to hold off on that for the near term.  Otherwise, we will refill her usual medications and plan to see her back in early October as scheduled, sooner if needed.    Diagnoses and all orders for this visit:    1. Diverticulosis (Primary)  Comments:  As above.    2. Weight gain  -     CBC Auto Differential; Future  -      Vitamin B12 & Folate; Future  -     Comprehensive Metabolic Panel; Future  -     TSH+Free T4; Future    3. Chronic depression  -     TSH+Free T4; Future    4. Fatigue, unspecified type  -     CBC Auto Differential; Future  -     Vitamin B12 & Folate; Future  -     Comprehensive Metabolic Panel; Future  -     TSH+Free T4; Future    5. Essential hypertension  -     amLODIPine (NORVASC) 10 MG tablet; Take 1 tablet by mouth Daily.  Dispense: 90 tablet; Refill: 3  -     carvedilol (COREG) 12.5 MG tablet; Take 1 tablet by mouth 2 (Two) Times a Day.  Dispense: 180 tablet; Refill: 3  -     losartan (COZAAR) 100 MG tablet; Take 1 tablet by mouth Daily.  Dispense: 90 tablet; Refill: 3    6. Mixed hyperlipidemia  -     atorvastatin (LIPITOR) 20 MG tablet; Take 1 tablet by mouth Daily.  Dispense: 90 tablet; Refill: 3    7. GERD without esophagitis  -     omeprazole (priLOSEC) 40 MG capsule; Take 1 capsule by mouth Daily.  Dispense: 90 capsule; Refill: 3    8. Encounter for screening mammogram for malignant neoplasm of breast  -     Mammo Screening Digital Tomosynthesis Bilateral With CAD; Future    Follow Up  Return in about 7 months (around 10/7/2025) for Recheck, Next scheduled follow up.  Patient was given instructions and counseling regarding her condition or for health maintenance advice. Please see specific information pulled into the AVS if appropriate.

## 2025-03-24 ENCOUNTER — TELEPHONE (OUTPATIENT)
Dept: FAMILY MEDICINE CLINIC | Age: 65
End: 2025-03-24
Payer: MEDICARE

## 2025-03-24 DIAGNOSIS — R79.89 ELEVATED SERUM CREATININE: ICD-10-CM

## 2025-03-24 DIAGNOSIS — I10 ESSENTIAL HYPERTENSION: Primary | ICD-10-CM

## 2025-03-24 NOTE — TELEPHONE ENCOUNTER
----- Message from Laura BARAJAS sent at 3/3/2025  8:42 AM EST -----  TICKLE for BMP in 3 weeks for hypertension, elevated serum creatinine.  She should have ample fluids to drink prior to coming for the follow-up testing.  Thanks.

## 2025-04-14 ENCOUNTER — TELEPHONE (OUTPATIENT)
Dept: FAMILY MEDICINE CLINIC | Age: 65
End: 2025-04-14
Payer: MEDICARE

## 2025-04-15 ENCOUNTER — LAB (OUTPATIENT)
Dept: LAB | Facility: HOSPITAL | Age: 65
End: 2025-04-15
Payer: MEDICARE

## 2025-04-15 DIAGNOSIS — R79.89 ELEVATED SERUM CREATININE: ICD-10-CM

## 2025-04-15 DIAGNOSIS — I10 ESSENTIAL HYPERTENSION: ICD-10-CM

## 2025-04-15 LAB
ANION GAP SERPL CALCULATED.3IONS-SCNC: 11.8 MMOL/L (ref 5–15)
BUN SERPL-MCNC: 16 MG/DL (ref 8–23)
BUN/CREAT SERPL: 17 (ref 7–25)
CALCIUM SPEC-SCNC: 9.7 MG/DL (ref 8.6–10.5)
CHLORIDE SERPL-SCNC: 105 MMOL/L (ref 98–107)
CO2 SERPL-SCNC: 24.2 MMOL/L (ref 22–29)
CREAT SERPL-MCNC: 0.94 MG/DL (ref 0.57–1)
EGFRCR SERPLBLD CKD-EPI 2021: 68.7 ML/MIN/1.73
GLUCOSE SERPL-MCNC: 87 MG/DL (ref 65–99)
POTASSIUM SERPL-SCNC: 4.3 MMOL/L (ref 3.5–5.2)
SODIUM SERPL-SCNC: 141 MMOL/L (ref 136–145)

## 2025-04-15 PROCEDURE — 36415 COLL VENOUS BLD VENIPUNCTURE: CPT

## 2025-04-15 PROCEDURE — 80048 BASIC METABOLIC PNL TOTAL CA: CPT

## 2025-04-16 ENCOUNTER — TELEPHONE (OUTPATIENT)
Dept: FAMILY MEDICINE CLINIC | Age: 65
End: 2025-04-16
Payer: MEDICARE

## 2025-04-16 NOTE — TELEPHONE ENCOUNTER
Caller: Chely Calderón    Relationship: Self    Best call back number: [phone]    What is the best time to reach you: ANYTIME    Who are you requesting to speak with (clinical staff, provider,  specific staff member): NURSE       What was the call regarding: PATIENT JUST TESTED POSITIVE FOR COVID AND WANTED TO SEE IF THERE WAS ANYTHING SHE COULD TAKE BESIDES PAXLOVID BECAUSE SHE HAD A BAD REACTION TO THAT.  PLEASE CALL TO ADVISE.

## 2025-04-17 NOTE — TELEPHONE ENCOUNTER
Please let Chely know that I have reviewed her chart and care.  For most patients with COVID-19, it is more disruptive now than dangerous.  Because she is not able to tolerate Paxlovid, I would lean against medication for it.  There are potentially other treatments available, but they would generally be given to elderly patients or those with chronic diseases such as COPD.  Short-term supportive care would include medication for fever if needed, intake of adequate fluids, and short-term rest if needed.    Regarding isolation, it would be recommended that she isolate UNTIL she has been without fever for at least 24 hours AND her symptoms are improving for at least 24 hours.  It would still be recommended that she wear a mask for an additional 5 days thereafter when around others.  There are potential red flag symptoms for which she may want to seek medical attention including significant shortness of breath, new chest pain, fever of 100.5 degrees or higher that does not respond to Tylenol, and/or significant weakness.  Hopefully, she will have a mild course with illness.  Me know if she has other concerns.  Thanks.

## 2025-07-28 NOTE — TELEPHONE ENCOUNTER
----- Message from Laura BARAJAS sent at 11/14/2022  8:34 AM EST -----  Dexa scan     Detail Level: Zone Otc Regimen: Recommend the following Over-The-Counter treatment(s): apply petroleum to the affected areas as needed.

## 2025-08-25 ENCOUNTER — TELEPHONE (OUTPATIENT)
Dept: FAMILY MEDICINE CLINIC | Age: 65
End: 2025-08-25
Payer: MEDICARE

## 2025-08-26 ENCOUNTER — TELEPHONE (OUTPATIENT)
Dept: FAMILY MEDICINE CLINIC | Age: 65
End: 2025-08-26
Payer: MEDICARE

## 2025-08-26 DIAGNOSIS — K57.92 DIVERTICULITIS: Primary | ICD-10-CM

## 2025-08-26 RX ORDER — METRONIDAZOLE 500 MG/1
500 TABLET ORAL 3 TIMES DAILY
Qty: 30 TABLET | Refills: 0 | Status: SHIPPED | OUTPATIENT
Start: 2025-08-26

## 2025-08-26 RX ORDER — CIPROFLOXACIN 500 MG/1
500 TABLET, FILM COATED ORAL 2 TIMES DAILY
Qty: 20 TABLET | Refills: 0 | Status: SHIPPED | OUTPATIENT
Start: 2025-08-26

## 2025-08-28 ENCOUNTER — OFFICE VISIT (OUTPATIENT)
Dept: FAMILY MEDICINE CLINIC | Age: 65
End: 2025-08-28
Payer: MEDICARE

## 2025-08-28 VITALS
WEIGHT: 196.4 LBS | HEIGHT: 64 IN | DIASTOLIC BLOOD PRESSURE: 77 MMHG | BODY MASS INDEX: 33.53 KG/M2 | TEMPERATURE: 97.8 F | HEART RATE: 60 BPM | OXYGEN SATURATION: 100 % | SYSTOLIC BLOOD PRESSURE: 115 MMHG

## 2025-08-28 DIAGNOSIS — K57.92 DIVERTICULITIS: Primary | ICD-10-CM

## 2025-08-28 RX ORDER — ESTRADIOL 0.1 MG/G
CREAM VAGINAL DAILY
COMMUNITY
Start: 2025-07-16